# Patient Record
Sex: MALE | Race: WHITE | NOT HISPANIC OR LATINO | Employment: FULL TIME | URBAN - METROPOLITAN AREA
[De-identification: names, ages, dates, MRNs, and addresses within clinical notes are randomized per-mention and may not be internally consistent; named-entity substitution may affect disease eponyms.]

---

## 2017-01-09 ENCOUNTER — HOSPITAL ENCOUNTER (EMERGENCY)
Facility: HOSPITAL | Age: 62
Discharge: HOME | End: 2017-01-09
Attending: EMERGENCY MEDICINE
Payer: COMMERCIAL

## 2017-01-09 VITALS
WEIGHT: 181 LBS | DIASTOLIC BLOOD PRESSURE: 89 MMHG | BODY MASS INDEX: 26.73 KG/M2 | TEMPERATURE: 99 F | RESPIRATION RATE: 20 BRPM | OXYGEN SATURATION: 95 % | HEART RATE: 92 BPM | SYSTOLIC BLOOD PRESSURE: 143 MMHG

## 2017-01-09 DIAGNOSIS — R74.8 ELEVATED LIVER ENZYMES: ICD-10-CM

## 2017-01-09 DIAGNOSIS — E88.89 ALCOHOLIC KETOSIS: ICD-10-CM

## 2017-01-09 DIAGNOSIS — F10.10 ALCOHOL ABUSE: ICD-10-CM

## 2017-01-09 DIAGNOSIS — R11.2 NON-INTRACTABLE VOMITING WITH NAUSEA, UNSPECIFIED VOMITING TYPE: ICD-10-CM

## 2017-01-09 DIAGNOSIS — D69.6 THROMBOCYTOPENIA: ICD-10-CM

## 2017-01-09 DIAGNOSIS — Z72.0 TOBACCO ABUSE: ICD-10-CM

## 2017-01-09 DIAGNOSIS — R03.0 ELEVATED BLOOD PRESSURE READING: Primary | ICD-10-CM

## 2017-01-09 DIAGNOSIS — E87.29 ALCOHOLIC KETOACIDOSIS: ICD-10-CM

## 2017-01-09 DIAGNOSIS — E83.42 HYPOMAGNESEMIA: ICD-10-CM

## 2017-01-09 LAB
ALBUMIN SERPL BCP-MCNC: 4.5 G/DL
ALP SERPL-CCNC: 81 U/L
ALT SERPL W/O P-5'-P-CCNC: 39 U/L
AMPHET+METHAMPHET UR QL: NEGATIVE
ANION GAP SERPL CALC-SCNC: 21 MMOL/L
AST SERPL-CCNC: 42 U/L
B-OH-BUTYR BLD STRIP-SCNC: 0.9 MMOL/L
BACTERIA #/AREA URNS AUTO: ABNORMAL /HPF
BARBITURATES UR QL SCN>200 NG/ML: NEGATIVE
BASOPHILS # BLD AUTO: 0.01 K/UL
BASOPHILS NFR BLD: 0.1 %
BENZODIAZ UR QL SCN>200 NG/ML: NEGATIVE
BILIRUB SERPL-MCNC: 1.7 MG/DL
BILIRUB UR QL STRIP: ABNORMAL
BUN SERPL-MCNC: 8 MG/DL
BZE UR QL SCN: NEGATIVE
CALCIUM SERPL-MCNC: 9.4 MG/DL
CANNABINOIDS UR QL SCN: NEGATIVE
CHLORIDE SERPL-SCNC: 95 MMOL/L
CLARITY UR REFRACT.AUTO: CLEAR
CO2 SERPL-SCNC: 24 MMOL/L
COLOR UR AUTO: ABNORMAL
CREAT SERPL-MCNC: 0.9 MG/DL
CREAT UR-MCNC: 221.5 MG/DL
DIFFERENTIAL METHOD: ABNORMAL
EOSINOPHIL # BLD AUTO: 0 K/UL
EOSINOPHIL NFR BLD: 0 %
ERYTHROCYTE [DISTWIDTH] IN BLOOD BY AUTOMATED COUNT: 14.2 %
EST. GFR  (AFRICAN AMERICAN): >60 ML/MIN/1.73 M^2
EST. GFR  (NON AFRICAN AMERICAN): >60 ML/MIN/1.73 M^2
ETHANOL SERPL-MCNC: 86 MG/DL
GLUCOSE SERPL-MCNC: 103 MG/DL
GLUCOSE UR QL STRIP: NEGATIVE
HCT VFR BLD AUTO: 42.6 %
HGB BLD-MCNC: 15.3 G/DL
HGB UR QL STRIP: NEGATIVE
HYALINE CASTS UR QL AUTO: 1 /LPF
INR PPP: 1
KETONES UR QL STRIP: ABNORMAL
LEUKOCYTE ESTERASE UR QL STRIP: NEGATIVE
LIPASE SERPL-CCNC: 21 U/L
LYMPHOCYTES # BLD AUTO: 0.9 K/UL
LYMPHOCYTES NFR BLD: 10.5 %
MAGNESIUM SERPL-MCNC: 1.5 MG/DL
MCH RBC QN AUTO: 36.4 PG
MCHC RBC AUTO-ENTMCNC: 35.9 %
MCV RBC AUTO: 101 FL
METHADONE UR QL SCN>300 NG/ML: NEGATIVE
MICROSCOPIC COMMENT: ABNORMAL
MONOCYTES # BLD AUTO: 0.6 K/UL
MONOCYTES NFR BLD: 7.3 %
NEUTROPHILS # BLD AUTO: 6.6 K/UL
NEUTROPHILS NFR BLD: 82 %
NITRITE UR QL STRIP: NEGATIVE
OPIATES UR QL SCN: NEGATIVE
OTHER ELEMENTS URNS MICRO: ABNORMAL
PCP UR QL SCN>25 NG/ML: NEGATIVE
PH UR STRIP: 6 [PH] (ref 5–8)
PLATELET # BLD AUTO: 105 K/UL
PMV BLD AUTO: 9.2 FL
POTASSIUM SERPL-SCNC: 3.8 MMOL/L
PROT SERPL-MCNC: 7.8 G/DL
PROT UR QL STRIP: ABNORMAL
PROTHROMBIN TIME: 10.5 SEC
RBC # BLD AUTO: 4.2 M/UL
RBC #/AREA URNS AUTO: 0 /HPF (ref 0–4)
SODIUM SERPL-SCNC: 140 MMOL/L
SP GR UR STRIP: 1.02 (ref 1–1.03)
SQUAMOUS #/AREA URNS AUTO: 2 /HPF
TOXICOLOGY INFORMATION: NORMAL
URN SPEC COLLECT METH UR: ABNORMAL
UROBILINOGEN UR STRIP-ACNC: <2 EU/DL
WBC # BLD AUTO: 8.1 K/UL
WBC #/AREA URNS AUTO: 1 /HPF (ref 0–5)

## 2017-01-09 PROCEDURE — 63600175 PHARM REV CODE 636 W HCPCS: Performed by: EMERGENCY MEDICINE

## 2017-01-09 PROCEDURE — 96376 TX/PRO/DX INJ SAME DRUG ADON: CPT

## 2017-01-09 PROCEDURE — 25000003 PHARM REV CODE 250: Performed by: EMERGENCY MEDICINE

## 2017-01-09 PROCEDURE — 27000221 HC OXYGEN, UP TO 24 HOURS: Performed by: GENERAL PRACTICE

## 2017-01-09 PROCEDURE — 99284 EMERGENCY DEPT VISIT MOD MDM: CPT | Mod: 25

## 2017-01-09 PROCEDURE — 96365 THER/PROPH/DIAG IV INF INIT: CPT

## 2017-01-09 PROCEDURE — 81000 URINALYSIS NONAUTO W/SCOPE: CPT

## 2017-01-09 PROCEDURE — 80053 COMPREHEN METABOLIC PANEL: CPT

## 2017-01-09 PROCEDURE — 85025 COMPLETE CBC W/AUTO DIFF WBC: CPT

## 2017-01-09 PROCEDURE — 83690 ASSAY OF LIPASE: CPT

## 2017-01-09 PROCEDURE — 82010 KETONE BODYS QUAN: CPT

## 2017-01-09 PROCEDURE — 82570 ASSAY OF URINE CREATININE: CPT

## 2017-01-09 PROCEDURE — 83735 ASSAY OF MAGNESIUM: CPT

## 2017-01-09 PROCEDURE — 96361 HYDRATE IV INFUSION ADD-ON: CPT

## 2017-01-09 PROCEDURE — 80320 DRUG SCREEN QUANTALCOHOLS: CPT

## 2017-01-09 PROCEDURE — 96375 TX/PRO/DX INJ NEW DRUG ADDON: CPT

## 2017-01-09 PROCEDURE — 85610 PROTHROMBIN TIME: CPT

## 2017-01-09 RX ORDER — ONDANSETRON 4 MG/1
4 TABLET, ORALLY DISINTEGRATING ORAL EVERY 8 HOURS PRN
Qty: 12 TABLET | Refills: 0 | Status: SHIPPED | OUTPATIENT
Start: 2017-01-09 | End: 2017-02-13

## 2017-01-09 RX ORDER — LORAZEPAM 1 MG/1
1 TABLET ORAL EVERY 6 HOURS PRN
Qty: 16 TABLET | Refills: 0 | Status: SHIPPED | OUTPATIENT
Start: 2017-01-09 | End: 2017-02-15

## 2017-01-09 RX ORDER — MAGNESIUM SULFATE 1 G/100ML
2 INJECTION INTRAVENOUS
Status: COMPLETED | OUTPATIENT
Start: 2017-01-09 | End: 2017-01-09

## 2017-01-09 RX ORDER — ONDANSETRON 2 MG/ML
4 INJECTION INTRAMUSCULAR; INTRAVENOUS
Status: COMPLETED | OUTPATIENT
Start: 2017-01-09 | End: 2017-01-09

## 2017-01-09 RX ORDER — LORAZEPAM 2 MG/ML
2 INJECTION INTRAMUSCULAR
Status: COMPLETED | OUTPATIENT
Start: 2017-01-09 | End: 2017-01-09

## 2017-01-09 RX ORDER — LORAZEPAM 2 MG/ML
1 INJECTION INTRAMUSCULAR
Status: COMPLETED | OUTPATIENT
Start: 2017-01-09 | End: 2017-01-09

## 2017-01-09 RX ORDER — DEXTROSE MONOHYDRATE AND SODIUM CHLORIDE 5; .45 G/100ML; G/100ML
1000 INJECTION, SOLUTION INTRAVENOUS
Status: COMPLETED | OUTPATIENT
Start: 2017-01-09 | End: 2017-01-09

## 2017-01-09 RX ADMIN — ONDANSETRON 4 MG: 2 INJECTION INTRAMUSCULAR; INTRAVENOUS at 05:01

## 2017-01-09 RX ADMIN — THIAMINE HYDROCHLORIDE 100 MG: 100 INJECTION, SOLUTION INTRAMUSCULAR; INTRAVENOUS at 05:01

## 2017-01-09 RX ADMIN — SODIUM CHLORIDE 1000 ML: 0.9 INJECTION, SOLUTION INTRAVENOUS at 05:01

## 2017-01-09 RX ADMIN — LORAZEPAM 1 MG: 2 INJECTION, SOLUTION INTRAMUSCULAR; INTRAVENOUS at 05:01

## 2017-01-09 RX ADMIN — MAGNESIUM SULFATE IN DEXTROSE 2 G: 10 INJECTION, SOLUTION INTRAVENOUS at 06:01

## 2017-01-09 RX ADMIN — LORAZEPAM 2 MG: 2 INJECTION, SOLUTION INTRAMUSCULAR; INTRAVENOUS at 06:01

## 2017-01-09 RX ADMIN — DEXTROSE AND SODIUM CHLORIDE 1000 ML: 5; .45 INJECTION, SOLUTION INTRAVENOUS at 06:01

## 2017-01-09 NOTE — ED PROVIDER NOTES
"Encounter Date: 1/9/2017       History     Chief Complaint   Patient presents with    Alcohol Problem     pt states "i've been in here before for the same thing, vomiting too much from drinking too much alcohol, I think I need to get into AA because it's becoming too much of a bad habit" fifth a day since before new year, vomiting with no food consumption only alcohol     Review of patient's allergies indicates:  No Known Allergies  HPI Comments: 60 y/o male with recent binges of alcohol intake ([place]) and stating he does not eat with the binges. C/O vomiting with occasional blood streaks. "Shaky feeling". Last drank yesterday.    Patient is a 61 y.o. male presenting with the following complaint: vomiting. The history is provided by the patient.   Emesis    This is a recurrent problem. The current episode started yesterday. The problem has been unchanged. The emesis has an appearance of stomach contents. Pertinent negatives include no abdominal pain, no arthralgias, no chills, no cough, no diarrhea, no fever, no headaches, no myalgias, no sweats and no URI.     Past Medical History   Diagnosis Date    Alcohol abuse      No past medical history pertinent negatives.  History reviewed. No pertinent past surgical history.  History reviewed. No pertinent family history.  Social History   Substance Use Topics    Smoking status: Current Every Day Smoker     Packs/day: 2.00     Types: Cigarettes    Smokeless tobacco: None    Alcohol use Yes      Comment: daily- fifth     Review of Systems   Constitutional: Positive for appetite change. Negative for chills and fever.   HENT: Negative.    Eyes: Negative.    Respiratory: Negative.  Negative for cough.    Gastrointestinal: Positive for vomiting. Negative for abdominal pain and diarrhea.   Genitourinary: Negative.    Musculoskeletal: Negative for arthralgias and myalgias.   Neurological: Positive for tremors. Negative for seizures, syncope, speech difficulty, " light-headedness, numbness and headaches.   Psychiatric/Behavioral: Positive for agitation. Negative for behavioral problems.   All other systems reviewed and are negative.      Physical Exam   Initial Vitals   BP Pulse Resp Temp SpO2   01/09/17 0445 01/09/17 0445 01/09/17 0445 01/09/17 0445 01/09/17 0445   151/90 98 18 98.8 °F (37.1 °C) 98 %     Physical Exam    Nursing note and vitals reviewed.  Constitutional: He appears well-developed. No distress.   HENT:   Head: Normocephalic and atraumatic.   Right Ear: External ear normal.   Left Ear: External ear normal.   Nose: Nose normal.   Mouth/Throat: Oropharynx is clear and moist.   Eyes: Conjunctivae and EOM are normal. Pupils are equal, round, and reactive to light. Right eye exhibits no discharge. Left eye exhibits no discharge.   Neck: Normal range of motion. Neck supple. No thyromegaly present.   Cardiovascular: Normal rate, regular rhythm and intact distal pulses.   Pulmonary/Chest: Breath sounds normal. No respiratory distress. He has no wheezes. He has no rales. He exhibits no tenderness.   Abdominal: Soft. Bowel sounds are normal. He exhibits no distension and no mass. There is tenderness. There is no rebound and no guarding.   Mild, diffuse tenderness   Musculoskeletal: Normal range of motion.   Lymphadenopathy:     He has no cervical adenopathy.   Neurological: He is alert and oriented to person, place, and time. He has normal strength.   Skin: Skin is warm and dry. No rash noted.   Psychiatric: He has a normal mood and affect. His behavior is normal.         ED Course   Procedures  Labs Reviewed   CBC W/ AUTO DIFFERENTIAL - Abnormal; Notable for the following:        Result Value    RBC 4.20 (*)      (*)     MCH 36.4 (*)     Platelets 105 (*)     Lymph # 0.9 (*)     Gran% 82.0 (*)     Lymph% 10.5 (*)     All other components within normal limits   COMPREHENSIVE METABOLIC PANEL - Abnormal; Notable for the following:     Total Bilirubin 1.7 (*)      AST 42 (*)     Anion Gap 21 (*)     All other components within normal limits   MAGNESIUM - Abnormal; Notable for the following:     Magnesium 1.5 (*)     All other components within normal limits   URINALYSIS - Abnormal; Notable for the following:     Color, UA Orange (*)     Protein, UA 1+ (*)     Ketones, UA Trace (*)     Bilirubin (UA) 1+ (*)     All other components within normal limits   ALCOHOL,MEDICAL (ETHANOL) - Abnormal; Notable for the following:     Alcohol, Medical, Serum 86 (*)     All other components within normal limits   URINALYSIS MICROSCOPIC - Abnormal; Notable for the following:     Bacteria, UA Few (*)     All other components within normal limits   BETA - HYDROXYBUTYRATE, SERUM - Abnormal; Notable for the following:     Beta-Hydroxybutyrate 0.9 (*)     All other components within normal limits   DRUG SCREEN PANEL, URINE EMERGENCY   PROTIME-INR   LIPASE   BETA - HYDROXYBUTYRATE, SERUM   LIPASE         Results for orders placed or performed during the hospital encounter of 01/09/17   CBC auto differential   Result Value Ref Range    WBC 8.10 3.90 - 12.70 K/uL    RBC 4.20 (L) 4.60 - 6.20 M/uL    Hemoglobin 15.3 14.0 - 18.0 g/dL    Hematocrit 42.6 40.0 - 54.0 %     (H) 82 - 98 fL    MCH 36.4 (H) 27.0 - 31.0 pg    MCHC 35.9 32.0 - 36.0 %    RDW 14.2 11.5 - 14.5 %    Platelets 105 (L) 150 - 350 K/uL    MPV 9.2 9.2 - 12.9 fL    Gran # 6.6 1.8 - 7.7 K/uL    Lymph # 0.9 (L) 1.0 - 4.8 K/uL    Mono # 0.6 0.3 - 1.0 K/uL    Eos # 0.0 0.0 - 0.5 K/uL    Baso # 0.01 0.00 - 0.20 K/uL    Gran% 82.0 (H) 38.0 - 73.0 %    Lymph% 10.5 (L) 18.0 - 48.0 %    Mono% 7.3 4.0 - 15.0 %    Eosinophil% 0.0 0.0 - 8.0 %    Basophil% 0.1 0.0 - 1.9 %    Differential Method Automated    Comprehensive metabolic panel   Result Value Ref Range    Sodium 140 136 - 145 mmol/L    Potassium 3.8 3.5 - 5.1 mmol/L    Chloride 95 95 - 110 mmol/L    CO2 24 23 - 29 mmol/L    Glucose 103 70 - 110 mg/dL    BUN, Bld 8 6 - 20 mg/dL     Creatinine 0.9 0.5 - 1.4 mg/dL    Calcium 9.4 8.7 - 10.5 mg/dL    Total Protein 7.8 6.0 - 8.4 g/dL    Albumin 4.5 3.5 - 5.2 g/dL    Total Bilirubin 1.7 (H) 0.1 - 1.0 mg/dL    Alkaline Phosphatase 81 55 - 135 U/L    AST 42 (H) 10 - 40 U/L    ALT 39 10 - 44 U/L    Anion Gap 21 (H) 8 - 16 mmol/L    eGFR if African American >60.0 >60 mL/min/1.73 m^2    eGFR if non African American >60.0 >60 mL/min/1.73 m^2   Drug screen panel, emergency   Result Value Ref Range    Benzodiazepines Negative     Methadone metabolites Negative     Cocaine (Metab.) Negative     Opiate Scrn, Ur Negative     Barbiturate Screen, Ur Negative     Amphetamine Screen, Ur Negative     THC Negative     Phencyclidine Negative     Creatinine, Random Ur 221.5 23.0 - 375.0 mg/dL    Toxicology Information SEE COMMENT    Magnesium   Result Value Ref Range    Magnesium 1.5 (L) 1.6 - 2.6 mg/dL   Urinalysis   Result Value Ref Range    Specimen UA Urine, Clean Catch     Color, UA Orange (A) Yellow, Straw, Daily    Appearance, UA Clear Clear    pH, UA 6.0 5.0 - 8.0    Specific Gravity, UA 1.025 1.005 - 1.030    Protein, UA 1+ (A) Negative    Glucose, UA Negative Negative    Ketones, UA Trace (A) Negative    Bilirubin (UA) 1+ (A) Negative    Occult Blood UA Negative Negative    Nitrite, UA Negative Negative    Urobilinogen, UA <2.0 <2.0 EU/dL    Leukocytes, UA Negative Negative   Ethanol   Result Value Ref Range    Alcohol, Medical, Serum 86 (H) <10 mg/dL   Protime-INR   Result Value Ref Range    Prothrombin Time 10.5 9.0 - 12.5 sec    INR 1.0 0.8 - 1.2   Urinalysis Microscopic   Result Value Ref Range    RBC, UA 0 0 - 4 /hpf    WBC, UA 1 0 - 5 /hpf    Bacteria, UA Few (A) None-Occ /hpf    Squam Epithel, UA 2 /hpf    Hyaline Casts, UA 1 0-1/lpf /lpf    Other (U/A) Mucus:  Heavy None    Microscopic Comment SEE COMMENT    Lipase   Result Value Ref Range    Lipase 21 4 - 60 U/L   Beta - Hydroxybutyrate, Serum   Result Value Ref Range    Beta-Hydroxybutyrate 0.9 (H)  0.0 - 0.5 mmol/L       Results for ALEX DONG (MRN [id number]) as of 1/9/2017 06:12   Ref. Range 10/25/2016 07:23 10/25/2016 07:23 10/25/2016 07:25 12/10/2016 04:50 1/9/2017 04:55   Platelets Latest Ref Range: 150 - 350 K/uL 139 (L)   115 (L) 105 (L)                          ED Course     Medications   thiamine (B-1) 100 mg in dextrose 5 % 50 mL IVPB (100 mg Intravenous Given 1/9/17 0527)   magnesium sulfate in dextrose IVPB (premix) 2 g (2 g Intravenous New Bag 1/9/17 0620)   sodium chloride 0.9% bolus 1,000 mL (1,000 mLs Intravenous New Bag 1/9/17 0528)   lorazepam injection 1 mg (1 mg Intravenous Given 1/9/17 0528)   ondansetron injection 4 mg (4 mg Intravenous Given 1/9/17 0528)   lorazepam injection 2 mg (2 mg Intravenous Given 1/9/17 0620)   dextrose 5 % and 0.45 % NaCl infusion (1,000 mLs Intravenous New Bag 1/9/17 0620)       Results for ALEX DONG (MRN [id number]) as of 1/9/2017 06:12   Ref. Range 10/25/2016 07:23 10/25/2016 07:23 10/25/2016 07:25 12/10/2016 04:50 1/9/2017 04:55   Platelets Latest Ref Range: 150 - 350 K/uL 139 (L)   115 (L) 105 (L)       0555: Pt with minimal improvement in tremors. Anion Gap-21. Magnesium low. Will treat with Mag/,Ativan and D5 1/2ns and monitor.   Care transitioned to Dr. Barnard at 0605    6:13 AM Care from Dr. Murillo.  Patient reports that he had been binge drinking over the last few weeks.  He notes that he started having some epigastric discomfort followed by nausea vomiting.  He notes he had some blood tinged emesis.  He stopped drinking a couple days ago.  Since then he's been feeling weak and shaky.  Patient denies any melena, hematochezia, hematemesis, fever, shortness breath, difficulty breathing, hallucinations, and abdominal pain.    General: Patient's awake alert oriented.  Dry mucous membranes  HEENT:  Atraumatic normocephalic.  Rectal muscles are intact.  Pupils are equal round reactive to light and accommodation  Heart: Regular rate and rhythm  without murmur  Lungs: Clear to auscultation without rales rhonchi or wheezes  Abdomen: Soft no rebound or guarding no masses.  Extremities.  No edema, no ecchymosis or bruising  Neuro: Cranial nerves II through XII intact.  Speech clear.  No tremors  Psychiatric No hallucinations.  Normal thought process.  No SI.    Assessment.  Patient with chronic alcohol abuse.  Elevated anion gap.  Likely alcoholic ketosis.  We'll replace with D5.  Patient had been given thiamine prior.  Patient was extensively counseled on alcohol abuse as well as symptoms of alcohol withdrawal.  Patient verbalized understanding.  All questions answered.  Patient also was  Counseled on not driving.    6:44 AM Re-examined paint.  No Vomiting, appears to be resting, no complaints.     8:58 AM AM Reassessment: Dr. Barnard reassessed the pt.  The pt is resting comfortably and is NAD.  Pt states their sx have improved.  Patient is awake alert oriented.  There is no hallucinations.  There is no tremors.  Again patient's was counseled on alcohol use.  He is provided resources for alcoholism.  Patient given education on delirium tremors.  Patient's awake alert oriented.  No slurred speech.  All questions answered.  Patient is able to tolerate by mouth in the emergency room.  Discussed test results, shared treatment plan, specific conditions for return, and the need for f/u.  Patient was educated that he is not specific drive.  She verbalized understanding.  Answered their questions at this time.  Pt understands and agrees to the plan.  The pt has remained hemodynamically stable through ED course and is stable for discharge.     Follow-up Information     Follow up with Primary Care Provider of Choice. Schedule an appointment as soon as possible for a visit in 2 days.    Why:  Decrease your alcohol intake gradually.  Return to the ED for:  hallucinations, tremors, worsening weakness, suicidal thoughts, black stools, bloody vomiting, sweating or other  concerns.     Contact information:    [phone] for appointment.             New Prescriptions    LORAZEPAM (ATIVAN) 1 MG TABLET    Take 1 tablet (1 mg total) by mouth every 6 (six) hours as needed for Anxiety.    ONDANSETRON (ZOFRAN-ODT) 4 MG TBDL    Take 1 tablet (4 mg total) by mouth every 8 (eight) hours as needed.        Discontinued Medications    LORAZEPAM (ATIVAN) 1 MG TABLET    Take 1 tablet (1 mg total) by mouth every 6 (six) hours as needed for Anxiety.    ONDANSETRON (ZOFRAN-ODT) 4 MG TBDL    Take 1 tablet (4 mg total) by mouth every 8 (eight) hours as needed.         Pre-hypertension/Hypertension: The pt has been informed that they may have pre-hypertension or hypertension based on a blood pressure reading in the ED. I recommend that the pt call the PCP listed on their discharge instructions or a physician of their choice this week to arrange f/u for further evaluation of possible pre-hypertension or hypertension.     Clinical Impression:       ICD-10-CM ICD-9-CM   1. Elevated blood pressure reading R03.0 796.2   2. Alcohol abuse F10.10 305.00   3. Alcoholic ketosis E87.2 276.2   4. Hypomagnesemia E83.42 275.2   5. Tobacco abuse Z72.0 305.1   6. Thrombocytopenia D69.6 287.5   7. Non-intractable vomiting with nausea, unspecified vomiting type R11.2 787.01   8. Elevated liver enzymes R74.8 790.5   9. Alcoholic ketoacidosis E87.2 276.2       Disposition:   Disposition: Discharged  Condition: Stable       Erica Barnard, DO  01/09/17 0900

## 2017-01-09 NOTE — DISCHARGE INSTRUCTIONS
[facility]  [address]  [place], [place] [address]  [phone]    Follow up in 1- 2 days    Return to the ED for: suicidal thoughts, thoughts of self harm, thoughts of harming other, confusion, difficulty breathing, difficulty awakening or other concerns.     Driving or other activities under influence of medications - Patient and/or family/caretaker was given a prescription for, or instructed to use a medicine that may impair ability to drive, operate machinery, or participate in other potentially dangerous activities.  Patient was instructed not to participate in these activities while under the influence of these medications.    Your blood pressure was elevated in the ED.  You may have high blood pressure.   Keep a log of your blood pressure and follow up with a Primary Care Provider within the next two weeks. Call [phone] for appointment.

## 2017-01-09 NOTE — ED NOTES
Patient verbally verified and Spelled Full Name and Date of Birth. LOC: The patient is awake, alert and aware of environment, the patient is oriented x 3 and speaking appropriately.  C/O nausea & vomiting since yesterday, has been drinking a fifth of whiskey daily since before Thanksgiving, has not been eating, not passing much urine  APPEARANCE:unshaven  HEENT: tongue with thick white coating  SKIN: Cool & dry.   MUSCULOSKELETAL: MAEW  RESPIRATORY: Brief WNL  CARDIAC: Brief WNL  GASTRO: Abdomen flat, nontender, distant bowel sounds  : Voided about 300 ml rust colored urine  Peripheral Vasc: Brief WNL  NEURO: Brief WNL, fine tremor noted to extremities  PSYCH: Brief WNL

## 2017-01-09 NOTE — ED AVS SNAPSHOT
[facility]  [address] [address] [address]               Barb Carson   2017  4:49 AM   ED    Description:  Male : 1955   Department:  [facility]           Your Care was Coordinated By:     Provider Role From To    Kunal Murillo MD Attending Provider 17 0446 17 0616      Reason for Visit     Alcohol Problem           Diagnoses this Visit        Comments    Elevated blood pressure reading    -  Primary     Alcohol abuse         Alcoholic ketosis         Hypomagnesemia         Tobacco abuse         Thrombocytopenia         Non-intractable vomiting with nausea, unspecified vomiting type         Elevated liver enzymes           ED Disposition     None           To Do List           Follow-up Information     Follow up with Primary Care Provider of Choice. Schedule an appointment as soon as possible for a visit in 2 days.    Why:  Decrease your alcohol intake gradually.  Return to the ED for:  hallucinations, tremors, worsening weakness, suicidal thoughts, black stools, bloody vomiting, sweating or other concerns.     Contact information:    [phone] for appointment.        These Medications        Disp Refills Start End    lorazepam (ATIVAN) 1 MG tablet 16 tablet 0 2017    Take 1 tablet (1 mg total) by mouth every 6 (six) hours as needed for Anxiety. - Oral    ondansetron (ZOFRAN-ODT) 4 MG TbDL 12 tablet 0 2017     Take 1 tablet (4 mg total) by mouth every 8 (eight) hours as needed. - Oral      [facility]     [facility] On Call Nurse Care Line -  Assistance  Registered nurses in the [facility] provide clinical advisement, health education, appointment booking, and other advisory services.  Call for this free service at [phone].             Medications           Message regarding Medications     Verify the changes and/or additions to your medication regime listed below are the same  as discussed with your clinician today.  If any of these changes or additions are incorrect, please notify your healthcare provider.        START taking these NEW medications        Refills    lorazepam (ATIVAN) 1 MG tablet 0    Sig: Take 1 tablet (1 mg total) by mouth every 6 (six) hours as needed for Anxiety.    Class: Print    Route: Oral    ondansetron (ZOFRAN-ODT) 4 MG TbDL 0    Sig: Take 1 tablet (4 mg total) by mouth every 8 (eight) hours as needed.    Class: Print    Route: Oral      These medications were administered today        Dose Freq    thiamine (B-1) 100 mg in dextrose 5 % 50 mL IVPB 100 mg ED 1 Time    Sig: Inject 100 mg into the vein ED 1 Time.    Class: Normal    Route: Intravenous    Non-formulary Exception Code: Defer to pharmacy    sodium chloride 0.9% bolus 1,000 mL 1,000 mL ED 1 Time    Sig: Inject 1,000 mLs into the vein ED 1 Time.    Class: Normal    Route: Intravenous    lorazepam injection 1 mg 1 mg ED 1 Time    Sig: Inject 0.5 mLs (1 mg total) into the vein ED 1 Time.    Class: Normal    Route: Intravenous    ondansetron injection 4 mg 4 mg ED 1 Time    Sig: Inject 4 mg into the vein ED 1 Time.    Class: Normal    Route: Intravenous    magnesium sulfate in dextrose IVPB (premix) 2 g 2 g ED 1 Time    Sig: Inject 200 mLs (2 g total) into the vein ED 1 Time.    Class: Normal    Route: Intravenous    lorazepam injection 2 mg 2 mg ED 1 Time    Sig: Inject 1 mL (2 mg total) into the vein ED 1 Time.    Class: Normal    Route: Intravenous    dextrose 5 % and 0.45 % NaCl infusion 1,000 mL ED 1 Time    Sig: Inject 1,000 mLs into the vein ED 1 Time.    Class: Normal    Route: Intravenous           Verify that the below list of medications is an accurate representation of the medications you are currently taking.  If none reported, the list may be blank. If incorrect, please contact your healthcare provider. Carry this list with you in case of emergency.           Current Medications      promethazine (PHENERGAN) 25 MG tablet Take 1 tablet (25 mg total) by mouth every 6 (six) hours as needed for Nausea.    lorazepam (ATIVAN) 1 MG tablet Take 1 tablet (1 mg total) by mouth every 6 (six) hours as needed for Anxiety.    lorazepam injection 1 mg Inject 0.5 mLs (1 mg total) into the vein ED 1 Time.    lorazepam injection 2 mg Inject 1 mL (2 mg total) into the vein ED 1 Time.    magnesium sulfate in dextrose IVPB (premix) 2 g Inject 200 mLs (2 g total) into the vein ED 1 Time.    ondansetron (ZOFRAN-ODT) 4 MG TbDL Take 1 tablet (4 mg total) by mouth every 8 (eight) hours as needed.    thiamine (B-1) 100 mg in dextrose 5 % 50 mL IVPB Inject 100 mg into the vein ED 1 Time.           Clinical Reference Information           Your Vitals Were     BP Pulse Temp Resp Weight SpO2    140/88 78 98.8 °F (37.1 °C) (Oral) 16 82.1 kg (181 lb) 97%    BMI                26.73 kg/m2          Allergies as of 1/9/2017     No Known Allergies      Immunizations Administered on Date of Encounter - 1/9/2017     None      ED Micro, Lab, POCT     Start Ordered       Status Ordering Provider    01/09/17 0631 01/09/17 0631  Beta - Hydroxybutyrate, Serum  Once      Final result     01/09/17 0615 01/09/17 0615  Lipase  Once      Final result     01/09/17 0613 01/09/17 0612  Lipase  Add-on      Completed     01/09/17 0613 01/09/17 0612  Beta - Hydroxybutyrate, Serum  Add-on      Completed     01/09/17 0506 01/09/17 0505  Protime-INR  STAT      Final result     01/09/17 0503 01/09/17 0503  Urinalysis Microscopic  Once      Final result     01/09/17 0500 01/09/17 0503  Ethanol  Once      Final result     01/09/17 0459 01/09/17 0503  CBC auto differential  STAT      Final result     01/09/17 0459 01/09/17 0503  Comprehensive metabolic panel  STAT      Final result     01/09/17 0459 01/09/17 0503  Drug screen panel, emergency  STAT      Final result     01/09/17 0459 01/09/17 0503  Magnesium  STAT      Final result     01/09/17 0450  01/09/17 0503  Urinalysis  STAT      Final result       ED Imaging Orders     None        Discharge Instructions       [facility]  [address]  [place], [place] [address]  [phone]    Follow up in 1- 2 days    Return to the ED for: suicidal thoughts, thoughts of self harm, thoughts of harming other, confusion, difficulty breathing, difficulty awakening or other concerns.     Driving or other activities under influence of medications - Patient and/or family/caretaker was given a prescription for, or instructed to use a medicine that may impair ability to drive, operate machinery, or participate in other potentially dangerous activities.  Patient was instructed not to participate in these activities while under the influence of these medications.    Your blood pressure was elevated in the ED.  You may have high blood pressure.   Keep a log of your blood pressure and follow up with a Primary Care Provider within the next two weeks. Call [phone] for appointment.       Discharge References/Attachments     ALCOHOL ABUSE (ENGLISH)    ALCOHOL WITHDRAWAL: WHAT TO EXPECT (ENGLISH)    PLATELETS (ENGLISH)    HYPERTENSION, TO BE CONFIRMED (ENGLISH)    LORAZEPAM ORAL TABLET (ENGLISH)    DIET, VOMITING OR DIARRHEA (ADULT) (ENGLISH)    ONDANSETRON ORAL DISINTEGRATING TABLET (ENGLISH)    SMOKING CESSATION (ENGLISH)      [facility] Sign-Up     Activating your [facility] account is as easy as 1-2-3!     1) Visit [email or web address], select Sign Up Now, enter this activation code and your date of birth, then select Next.  [email or web address]  Expires: 1/24/2017  5:52 AM      2) Create a username and password to use when you visit [facility] in the future and select a security question in case you lose your password and select Next.    3) Enter your e-mail address and click Sign Up!    Additional Information  If you have questions, please e-mail [email or web address] or call [phone] to talk to our  [facility] staff. Remember, [facility] is NOT to be used for urgent needs. For medical emergencies, dial 911.         Smoking Cessation     If you would like to quit smoking:   You may be eligible for free services if you are a [place] resident and started smoking cigarettes before September 1, 1988.  Call the Smoking Cessation Trust (SCT) toll free at (097) 712-2441 or (568) 539-9924.   Call 1-800-QUIT-NOW if you do not meet the above criteria.             [facility] complies with applicable Federal civil rights laws and does not discriminate on the basis of race, color, national origin, age, disability, or sex.        Language Assistance Services     ATTENTION: Language assistance services are available, free of charge. Please call [phone].      ATENCIÓN: Si habla español, tiene a pearson disposición servicios gratuitos de asistencia lingüística. Llame al [phone].     CHÚ Ý: N?u b?n nói Ti?ng Vi?t, có các d?ch v? h? tr? ngôn ng? mi?n phí dành cho b?n. G?i s? [phone].

## 2017-02-13 ENCOUNTER — HOSPITAL ENCOUNTER (EMERGENCY)
Facility: HOSPITAL | Age: 62
Discharge: HOME | End: 2017-02-13
Attending: EMERGENCY MEDICINE
Payer: COMMERCIAL

## 2017-02-13 VITALS
RESPIRATION RATE: 19 BRPM | OXYGEN SATURATION: 98 % | HEIGHT: 69 IN | DIASTOLIC BLOOD PRESSURE: 73 MMHG | WEIGHT: 180 LBS | SYSTOLIC BLOOD PRESSURE: 129 MMHG | BODY MASS INDEX: 26.66 KG/M2 | TEMPERATURE: 99 F | HEART RATE: 81 BPM

## 2017-02-13 DIAGNOSIS — N39.0 URINARY TRACT INFECTION WITH HEMATURIA, SITE UNSPECIFIED: ICD-10-CM

## 2017-02-13 DIAGNOSIS — R31.9 HEMATURIA: Primary | ICD-10-CM

## 2017-02-13 DIAGNOSIS — R31.9 URINARY TRACT INFECTION WITH HEMATURIA, SITE UNSPECIFIED: ICD-10-CM

## 2017-02-13 DIAGNOSIS — R11.10 VOMITING, INTRACTABILITY OF VOMITING NOT SPECIFIED, PRESENCE OF NAUSEA NOT SPECIFIED, UNSPECIFIED VOMITING TYPE: ICD-10-CM

## 2017-02-13 LAB
ANION GAP SERPL CALC-SCNC: 18 MMOL/L
BACTERIA #/AREA URNS AUTO: ABNORMAL /HPF
BASOPHILS # BLD AUTO: 0.01 K/UL
BASOPHILS NFR BLD: 0.2 %
BILIRUB UR QL STRIP: ABNORMAL
BUN SERPL-MCNC: 8 MG/DL
CALCIUM SERPL-MCNC: 9.7 MG/DL
CHLORIDE SERPL-SCNC: 99 MMOL/L
CLARITY UR REFRACT.AUTO: CLEAR
CO2 SERPL-SCNC: 24 MMOL/L
COLOR UR AUTO: ABNORMAL
CREAT SERPL-MCNC: 1 MG/DL
DIFFERENTIAL METHOD: ABNORMAL
EOSINOPHIL # BLD AUTO: 0 K/UL
EOSINOPHIL NFR BLD: 0 %
ERYTHROCYTE [DISTWIDTH] IN BLOOD BY AUTOMATED COUNT: 14.4 %
EST. GFR  (AFRICAN AMERICAN): >60 ML/MIN/1.73 M^2
EST. GFR  (NON AFRICAN AMERICAN): >60 ML/MIN/1.73 M^2
GLUCOSE SERPL-MCNC: 148 MG/DL
GLUCOSE UR QL STRIP: NEGATIVE
GRAN CASTS UR QL COMP ASSIST: 1 /LPF
HCT VFR BLD AUTO: 42.4 %
HGB BLD-MCNC: 14.9 G/DL
HGB UR QL STRIP: NEGATIVE
HYALINE CASTS UR QL AUTO: >100 /LPF
KETONES UR QL STRIP: ABNORMAL
LEUKOCYTE ESTERASE UR QL STRIP: NEGATIVE
LYMPHOCYTES # BLD AUTO: 0.3 K/UL
LYMPHOCYTES NFR BLD: 5.1 %
MCH RBC QN AUTO: 36.2 PG
MCHC RBC AUTO-ENTMCNC: 35.1 %
MCV RBC AUTO: 103 FL
MICROSCOPIC COMMENT: ABNORMAL
MONOCYTES # BLD AUTO: 0.2 K/UL
MONOCYTES NFR BLD: 3.6 %
NEUTROPHILS # BLD AUTO: 5.6 K/UL
NEUTROPHILS NFR BLD: 91.1 %
NITRITE UR QL STRIP: POSITIVE
PH UR STRIP: 5 [PH] (ref 5–8)
PLATELET # BLD AUTO: 143 K/UL
PMV BLD AUTO: 9.5 FL
POTASSIUM SERPL-SCNC: 3.6 MMOL/L
PROT UR QL STRIP: ABNORMAL
RBC # BLD AUTO: 4.12 M/UL
RBC #/AREA URNS AUTO: 0 /HPF (ref 0–4)
SODIUM SERPL-SCNC: 141 MMOL/L
SP GR UR STRIP: >=1.03 (ref 1–1.03)
URN SPEC COLLECT METH UR: ABNORMAL
UROBILINOGEN UR STRIP-ACNC: ABNORMAL EU/DL
WBC # BLD AUTO: 6.48 K/UL
WBC #/AREA URNS AUTO: 5 /HPF (ref 0–5)

## 2017-02-13 PROCEDURE — 63600175 PHARM REV CODE 636 W HCPCS: Performed by: EMERGENCY MEDICINE

## 2017-02-13 PROCEDURE — 99283 EMERGENCY DEPT VISIT LOW MDM: CPT | Mod: 25

## 2017-02-13 PROCEDURE — 80048 BASIC METABOLIC PNL TOTAL CA: CPT

## 2017-02-13 PROCEDURE — 96361 HYDRATE IV INFUSION ADD-ON: CPT

## 2017-02-13 PROCEDURE — 81000 URINALYSIS NONAUTO W/SCOPE: CPT

## 2017-02-13 PROCEDURE — 85025 COMPLETE CBC W/AUTO DIFF WBC: CPT

## 2017-02-13 PROCEDURE — 96374 THER/PROPH/DIAG INJ IV PUSH: CPT

## 2017-02-13 PROCEDURE — 25000003 PHARM REV CODE 250: Performed by: EMERGENCY MEDICINE

## 2017-02-13 RX ORDER — ONDANSETRON 2 MG/ML
4 INJECTION INTRAMUSCULAR; INTRAVENOUS
Status: COMPLETED | OUTPATIENT
Start: 2017-02-13 | End: 2017-02-13

## 2017-02-13 RX ORDER — CIPROFLOXACIN 250 MG/1
250 TABLET, FILM COATED ORAL 2 TIMES DAILY
Qty: 14 TABLET | Refills: 0 | Status: SHIPPED | OUTPATIENT
Start: 2017-02-13 | End: 2017-02-20

## 2017-02-13 RX ORDER — SODIUM CHLORIDE 9 MG/ML
1000 INJECTION, SOLUTION INTRAVENOUS ONCE
Status: COMPLETED | OUTPATIENT
Start: 2017-02-13 | End: 2017-02-13

## 2017-02-13 RX ADMIN — SODIUM CHLORIDE 1000 ML: 0.9 INJECTION, SOLUTION INTRAVENOUS at 02:02

## 2017-02-13 RX ADMIN — ONDANSETRON 4 MG: 2 INJECTION INTRAMUSCULAR; INTRAVENOUS at 02:02

## 2017-02-13 NOTE — ED PROVIDER NOTES
"Encounter Date: 2/13/2017       History     Chief Complaint   Patient presents with    Vomiting     had ETOH last night "im dehydrated"     Review of patient's allergies indicates:  No Known Allergies  HPI Comments: Patient presents to the ED c/o vomiting for the since yesterday.  Patient denies any abdominal pain or diarrhea.  He is a well appearing white male with a history alcohol abuse.  Patient drinks about 1 pint daily.  Patient last drank last night.  He shows no evidence of withdraws currently.  The patient has no hx of sick contacts.  He is not toxic appearing.    The history is provided by the patient.     Past Medical History   Diagnosis Date    Alcohol abuse      No past medical history pertinent negatives.  History reviewed. No pertinent past surgical history.  History reviewed. No pertinent family history.  Social History   Substance Use Topics    Smoking status: Current Every Day Smoker     Packs/day: 2.00     Types: Cigarettes    Smokeless tobacco: None    Alcohol use Yes      Comment: daily- fifth     Review of Systems   Constitutional: Negative.  Negative for chills, fatigue and fever.   HENT: Negative.    Eyes: Negative.    Respiratory: Negative.    Cardiovascular: Negative.    Gastrointestinal: Positive for nausea and vomiting. Negative for abdominal distention, abdominal pain, constipation, diarrhea and rectal pain.   Endocrine: Negative.    Genitourinary: Negative.    Musculoskeletal: Negative.    Skin: Negative for color change, pallor, rash and wound.   Allergic/Immunologic: Negative.    Neurological: Negative.    Hematological: Negative.    Psychiatric/Behavioral: Negative.        Physical Exam   Initial Vitals   BP Pulse Resp Temp SpO2   02/13/17 1340 02/13/17 1340 02/13/17 1340 02/13/17 1340 02/13/17 1340   134/85 94 18 98.6 °F (37 °C) 97 %     Physical Exam    Constitutional: He appears well-developed and well-nourished.   HENT:   Head: Normocephalic and atraumatic.   Eyes: " Conjunctivae and EOM are normal. Pupils are equal, round, and reactive to light.   Neck: Normal range of motion. Neck supple.   Cardiovascular: Normal rate, regular rhythm, normal heart sounds and intact distal pulses.   Pulmonary/Chest: Breath sounds normal.   Abdominal: Soft. He exhibits no distension and no mass. There is no tenderness. There is no rebound and no guarding.   Musculoskeletal: Normal range of motion.   Neurological: He is alert and oriented to person, place, and time. He has normal strength.   Skin: Skin is warm and dry.   Psychiatric: He has a normal mood and affect. His behavior is normal. Judgment and thought content normal.         ED Course   Procedures  Labs Reviewed   BASIC METABOLIC PANEL - Abnormal; Notable for the following:        Result Value    Glucose 148 (*)     Anion Gap 18 (*)     All other components within normal limits   URINALYSIS - Abnormal; Notable for the following:     Color, UA Brown (*)     Specific Gravity, UA >=1.030 (*)     Protein, UA 2+ (*)     Ketones, UA 1+ (*)     Bilirubin (UA) 2+ (*)     Nitrite, UA Positive (*)     Urobilinogen, UA 4.0-6.0 (*)     All other components within normal limits   URINALYSIS MICROSCOPIC - Abnormal; Notable for the following:     Hyaline Casts, UA >100 (*)     Granular Casts, UA 1 (*)     All other components within normal limits   CBC W/ AUTO DIFFERENTIAL                               ED Course   3:46 PM  Patient stable at this time.  He shows no evidence of withdraw symptoms.  I have discussed with the patient the importance of follow up with address his hematuria.  Patient has been given Dr. Lopez's information.  Patient told to return to the ED for any concerns or worsening of condition.     ED ONGOING VITALS:  Vitals:    02/13/17 1340 02/13/17 1435 02/13/17 1437 02/13/17 1439   BP: 134/85 123/70 120/70 121/72   Pulse: 94 85 84 105   Resp: 18 18 18 18   Temp: 98.6 °F (37 °C)      TempSrc: Oral      SpO2: 97%  95% 95%   Weight:  "81.6 kg (180 lb)      Height: 5' 9" (1.753 m)            ABNORMAL LAB VALUES:  Labs Reviewed   CBC W/ AUTO DIFFERENTIAL - Abnormal; Notable for the following:        Result Value    RBC 4.12 (*)      (*)     MCH 36.2 (*)     Platelets 143 (*)     All other components within normal limits   BASIC METABOLIC PANEL - Abnormal; Notable for the following:     Glucose 148 (*)     Anion Gap 18 (*)     All other components within normal limits   URINALYSIS - Abnormal; Notable for the following:     Color, UA Brown (*)     Specific Gravity, UA >=1.030 (*)     Protein, UA 2+ (*)     Ketones, UA 1+ (*)     Bilirubin (UA) 2+ (*)     Nitrite, UA Positive (*)     Urobilinogen, UA 4.0-6.0 (*)     All other components within normal limits   URINALYSIS MICROSCOPIC - Abnormal; Notable for the following:     Hyaline Casts, UA >100 (*)     Granular Casts, UA 1 (*)     All other components within normal limits         ALL LAB VALUES:  Results for orders placed or performed during the hospital encounter of 02/13/17   CBC auto differential   Result Value Ref Range    WBC 6.48 3.90 - 12.70 K/uL    RBC 4.12 (L) 4.60 - 6.20 M/uL    Hemoglobin 14.9 14.0 - 18.0 g/dL    Hematocrit 42.4 40.0 - 54.0 %     (H) 82 - 98 fL    MCH 36.2 (H) 27.0 - 31.0 pg    MCHC 35.1 32.0 - 36.0 %    RDW 14.4 11.5 - 14.5 %    Platelets 143 (L) 150 - 350 K/uL    MPV 9.5 9.2 - 12.9 fL   Basic metabolic panel   Result Value Ref Range    Sodium 141 136 - 145 mmol/L    Potassium 3.6 3.5 - 5.1 mmol/L    Chloride 99 95 - 110 mmol/L    CO2 24 23 - 29 mmol/L    Glucose 148 (H) 70 - 110 mg/dL    BUN, Bld 8 6 - 20 mg/dL    Creatinine 1.0 0.5 - 1.4 mg/dL    Calcium 9.7 8.7 - 10.5 mg/dL    Anion Gap 18 (H) 8 - 16 mmol/L    eGFR if African American >60.0 >60 mL/min/1.73 m^2    eGFR if non African American >60.0 >60 mL/min/1.73 m^2   Urinalysis   Result Value Ref Range    Specimen UA Urine, Clean Catch     Color, UA Brown (A) Yellow, Straw, Daily    Appearance, UA " Clear Clear    pH, UA 5.0 5.0 - 8.0    Specific Gravity, UA >=1.030 (A) 1.005 - 1.030    Protein, UA 2+ (A) Negative    Glucose, UA Negative Negative    Ketones, UA 1+ (A) Negative    Bilirubin (UA) 2+ (A) Negative    Occult Blood UA Negative Negative    Nitrite, UA Positive (A) Negative    Urobilinogen, UA 4.0-6.0 (A) <2.0 EU/dL    Leukocytes, UA Negative Negative   Urinalysis Microscopic   Result Value Ref Range    RBC, UA 0 0 - 4 /hpf    WBC, UA 5 0 - 5 /hpf    Bacteria, UA None None-Occ /hpf    Hyaline Casts, UA >100 (A) 0-1/lpf /lpf    Granular Casts, UA 1 (A) None /lpf    Microscopic Comment SEE COMMENT          RADIOLOGY STUDIES:  Imaging Results     None            The above vital signs and test results have been reviewed by the emergency provider.       ED MEDICATIONS:  Medications   0.9%  NaCl infusion (1,000 mLs Intravenous New Bag 2/13/17 2851)   ondansetron injection 4 mg (4 mg Intravenous Given 2/13/17 1277)           ED EVENTS:        3:49 PM    RE-EVALUATION & DISPOSITION:   Reassessment at the time of disposition demonstrates that the patient is resting comfortably in no acute distress.  He has remained hemodynamically stable throughout the entire ED visit and is without objective evidence for acute process requiring urgent intervention or hospitalization. I discussed test results and provided counseling to patient with regard to condition, the treatment plan, specific conditions for return, and the importance of follow up.  Answered questions at this time. The patient is stable for discharge.     New Prescriptions    CIPROFLOXACIN HCL (CIPRO) 250 MG TABLET    Take 1 tablet (250 mg total) by mouth 2 (two) times daily.      Clinical Impression:       ICD-10-CM ICD-9-CM   1. Hematuria R31.9 599.70   2. Urinary tract infection with hematuria, site unspecified N39.0 599.0    R31.9    3. Vomiting, intractability of vomiting not specified, presence of nausea not specified, unspecified vomiting type R11.10  787.03             Lionel Dockery MD  02/13/17 1549

## 2017-02-13 NOTE — ED AVS SNAPSHOT
[facility]  [address] [address] [address]               Barb Carson   2017  1:41 PM   ED    Description:  Male : 1955   Department:  [facility]           Your Care was Coordinated By:     Provider Role From To    Lionel Dockery MD Attending Provider 17 1423 --      Reason for Visit     Vomiting           Diagnoses this Visit        Comments    Hematuria    -  Primary     Urinary tract infection with hematuria, site unspecified         Vomiting, intractability of vomiting not specified, presence of nausea not specified, unspecified vomiting type           ED Disposition     None           To Do List           Follow-up Information     Follow up with Fidencio Lopez IV, MD In 1 week.    Specialty:  Urology    Why:  Patient should return to the ED for any concerns or worsening of condition.    Contact information:    [address]  [place] [address] [phone]         These Medications        Disp Refills Start End    ciprofloxacin HCl (CIPRO) 250 MG tablet 14 tablet 0 2017    Take 1 tablet (250 mg total) by mouth 2 (two) times daily. - Oral      [facility]     [facility] Care [facility] -  Assistance  Registered nurses in the [facility] provide clinical advisement, health education, appointment booking, and other advisory services.  Call for this free service at [phone].             Medications           Message regarding Medications     Verify the changes and/or additions to your medication regime listed below are the same as discussed with your clinician today.  If any of these changes or additions are incorrect, please notify your healthcare provider.        START taking these NEW medications        Refills    ciprofloxacin HCl (CIPRO) 250 MG tablet 0    Sig: Take 1 tablet (250 mg total) by mouth 2 (two) times daily.    Class: Print    Route: Oral      These  "medications were administered today        Dose Freq    0.9%  NaCl infusion 1,000 mL Once    Sig: Inject 1,000 mLs into the vein once.    Class: Normal    Route: Intravenous    ondansetron injection 4 mg 4 mg ED 1 Time    Sig: Inject 4 mg into the vein ED 1 Time.    Class: Normal    Route: Intravenous      STOP taking these medications     ondansetron (ZOFRAN-ODT) 4 MG TbDL Take 1 tablet (4 mg total) by mouth every 8 (eight) hours as needed.    promethazine (PHENERGAN) 25 MG tablet Take 1 tablet (25 mg total) by mouth every 6 (six) hours as needed for Nausea.           Verify that the below list of medications is an accurate representation of the medications you are currently taking.  If none reported, the list may be blank. If incorrect, please contact your healthcare provider. Carry this list with you in case of emergency.           Current Medications     ciprofloxacin HCl (CIPRO) 250 MG tablet Take 1 tablet (250 mg total) by mouth 2 (two) times daily.    lorazepam (ATIVAN) 1 MG tablet Take 1 tablet (1 mg total) by mouth every 6 (six) hours as needed for Anxiety.           Clinical Reference Information           Your Vitals Were     BP Pulse Temp Resp Height Weight    121/72 (Patient Position: Standing) 105 98.6 °F (37 °C) (Oral) 18 5' 9" (1.753 m) 81.6 kg (180 lb)    SpO2 BMI             95% 26.58 kg/m2         Allergies as of 2/13/2017     No Known Allergies      Immunizations Administered on Date of Encounter - 2/13/2017     None      ED Micro, Lab, POCT     Start Ordered       Status Ordering Provider    02/13/17 1427 02/13/17 1426  CBC auto differential  STAT      Preliminary result     02/13/17 1427 02/13/17 1426  Basic metabolic panel  STAT      Final result     02/13/17 1427 02/13/17 1426  Urinalysis  STAT      Final result     02/13/17 1426 02/13/17 1426  Urinalysis Microscopic  Once      Final result       ED Imaging Orders     None      [facility] Sign-Up     Activating your [facility] account is as " easy as 1-2-3!     1) Visit [email or web address], select Sign Up Now, enter this activation code and your date of birth, then select Next.  [email or web address]  Expires: 3/30/2017  3:45 PM      2) Create a username and password to use when you visit [facility] in the future and select a security question in case you lose your password and select Next.    3) Enter your e-mail address and click Sign Up!    Additional Information  If you have questions, please e-mail [email or web address] or call [phone] to talk to our [facility] staff. Remember, [facility] is NOT to be used for urgent needs. For medical emergencies, dial 911.         Smoking Cessation     If you would like to quit smoking:   You may be eligible for free services if you are a [place] resident and started smoking cigarettes before September 1, 1988.  Call the Smoking Cessation Trust ([facility]) toll free at (607) 448-3779 or (968) 898-7501.   Call 4-118-QUIT-NOW if you do not meet the above criteria.             [facility] complies with applicable Federal civil rights laws and does not discriminate on the basis of race, color, national origin, age, disability, or sex.        Language Assistance Services     ATTENTION: Language assistance services are available, free of charge. Please call [phone].      ATENCIÓN: Si habla español, tiene a pearson disposición servicios gratuitos de asistencia lingüística. Llame al [phone].     CHÚ Ý: N?u b?n nói Ti?ng Vi?t, có các d?ch v? h? tr? ngôn ng? mi?n phí dành cho b?n. G?i s? [phone].

## 2017-02-15 ENCOUNTER — OFFICE VISIT (OUTPATIENT)
Dept: INTERNAL MEDICINE | Facility: CLINIC | Age: 62
End: 2017-02-15
Payer: COMMERCIAL

## 2017-02-15 VITALS
DIASTOLIC BLOOD PRESSURE: 78 MMHG | WEIGHT: 181.88 LBS | SYSTOLIC BLOOD PRESSURE: 116 MMHG | OXYGEN SATURATION: 96 % | TEMPERATURE: 99 F | RESPIRATION RATE: 16 BRPM | HEART RATE: 77 BPM | BODY MASS INDEX: 27.57 KG/M2 | HEIGHT: 68 IN

## 2017-02-15 DIAGNOSIS — Z72.0 TOBACCO USE: ICD-10-CM

## 2017-02-15 DIAGNOSIS — F32.A DEPRESSION, UNSPECIFIED DEPRESSION TYPE: ICD-10-CM

## 2017-02-15 DIAGNOSIS — F10.10 ALCOHOL ABUSE: Primary | ICD-10-CM

## 2017-02-15 PROCEDURE — 99999 PR PBB SHADOW E&M-EST. PATIENT-LVL III: CPT | Mod: PBBFAC,,, | Performed by: FAMILY MEDICINE

## 2017-02-15 PROCEDURE — 99203 OFFICE O/P NEW LOW 30 MIN: CPT | Mod: S$GLB,,, | Performed by: FAMILY MEDICINE

## 2017-02-15 RX ORDER — THIAMINE HCL 50 MG
50 TABLET ORAL DAILY
Status: ON HOLD | COMMUNITY
Start: 2017-02-15 | End: 2017-04-26

## 2017-02-15 RX ORDER — MULTIVITAMIN
1 TABLET ORAL DAILY
COMMUNITY
Start: 2017-02-15 | End: 2017-08-15

## 2017-02-15 RX ORDER — LORAZEPAM 1 MG/1
1 TABLET ORAL EVERY 6 HOURS PRN
Qty: 30 TABLET | Refills: 0 | Status: SHIPPED | OUTPATIENT
Start: 2017-02-15 | End: 2017-03-16 | Stop reason: SDUPTHER

## 2017-02-15 RX ORDER — SERTRALINE HYDROCHLORIDE 50 MG/1
50 TABLET, FILM COATED ORAL DAILY
Qty: 30 TABLET | Refills: 11 | Status: SHIPPED | OUTPATIENT
Start: 2017-02-15 | End: 2017-08-15

## 2017-02-15 NOTE — MEDICAL/APP STUDENT
Subjective:       Patient ID: Barb Carson is a 61 y.o. male.    Chief Complaint: Hematuria (pt currently on cipro )    HPI     60 yo WM presenting alone for follow up from recent ED visit.  Pt went to ED on 2/13 for dehydration and blood in the urine.  Pt reports he believes he still has blood in his urine.  Pt reports constant suprapubic pain that increases at night when lying down.  Denies urinary frequency, dysuria, and urinary retention.  Denies burning with urination.  Reports lower back pain daily in afternoon after getting of work after being on his feet all day - this has been going on for several months.  Denies fever/vomiting.  Reports soft bowel movements with following each meal x 2-3 mths.  Denies cough.  Reports mild SOB with exertion x ~ 1 year.  Reports 1.5 PPD for ~ 20 years.  Reports drinking 1 pint/day during week days and a fifth on weekends - bourbon/whiskey x ~ 1 year.      History reviewed. No pertinent family history.  Current Outpatient Prescriptions on File Prior to Visit   Medication Sig Dispense Refill    ciprofloxacin HCl (CIPRO) 250 MG tablet Take 1 tablet (250 mg total) by mouth 2 (two) times daily. 14 tablet 0    [DISCONTINUED] lorazepam (ATIVAN) 1 MG tablet Take 1 tablet (1 mg total) by mouth every 6 (six) hours as needed for Anxiety. 16 tablet 0     No current facility-administered medications on file prior to visit.        Review of Systems   Constitutional: Negative for appetite change and fever.   HENT: Negative for dental problem, ear pain, nosebleeds, rhinorrhea and sore throat.    Eyes:        Reports worsening vision over past several years.   Respiratory: Positive for cough and shortness of breath. Negative for wheezing.    Cardiovascular: Negative for chest pain and leg swelling.   Gastrointestinal: Negative for abdominal pain, blood in stool and nausea.   Genitourinary: Positive for hematuria. Negative for difficulty urinating, dysuria and frequency.  "  Musculoskeletal: Positive for back pain.   Skin: Negative for rash.   Neurological: Positive for tremors and light-headedness. Negative for dizziness.        Reports intermittent lightheadedness x ~ 3 /week after drinking.   Psychiatric/Behavioral: Positive for sleep disturbance. Negative for suicidal ideas.        Reports depression denies SI       Objective:     Visit Vitals    /78 (BP Location: Left arm, Patient Position: Sitting, BP Method: Automatic)    Pulse 77    Temp 98.5 °F (36.9 °C) (Tympanic)    Resp 16    Ht 5' 8" (1.727 m)    Wt 82.5 kg (181 lb 14.1 oz)    SpO2 96%    BMI 27.65 kg/m2        Physical Exam   Constitutional: He is oriented to person, place, and time. He appears well-developed and well-nourished. No distress.   HENT:   Head: Normocephalic and atraumatic.   Right Ear: External ear normal.   Nose: Nose normal.   Mouth/Throat: No oropharyngeal exudate.   Eyes: Conjunctivae are normal. Right eye exhibits no discharge. Left eye exhibits no discharge.   Neck: Normal range of motion. No tracheal deviation present.   Cardiovascular: Normal rate, regular rhythm and normal heart sounds.    No murmur heard.  Pulmonary/Chest: Effort normal. He has wheezes.   Intermittent expiratory wheezing noted to left posterior lower lobe   Abdominal: Soft. Bowel sounds are normal. There is tenderness.   Musculoskeletal: Normal range of motion. He exhibits no edema.   Neurological: He is alert and oriented to person, place, and time.   Skin: Skin is warm and dry. He is not diaphoretic.   Psychiatric: He has a normal mood and affect. His behavior is normal.       Assessment & Plan     1. Alcohol abuse  Counseled on the importance of complete cessation.  Provided with handout on community resources especially Alcoholics Anonymous.  I feel that this problem is secondary to chronic depression and is causing him to have the lab abnormalities of urinary problems.  Advised him to follow-up in one month so " that we can recheck lab studies.  Provided him with thiamine, multivitamin and folate supplementation.    2. Depression, unspecified depression type  Starting him on Zoloft to help treat depression and anxiety.  Also provided with Ativan for breakthrough anxiety that could be secondary to alcoholic withdrawal or anxiety.    3. Tobacco use  Will discuss further cessation at future visits and also may consider a low-dose CT scan for lung cancer screening.

## 2017-02-15 NOTE — MR AVS SNAPSHOT
[facility]  [address] [address] [address]  Phone: [phone]                  Barb Carson   2/15/2017 4:20 PM   Office Visit    Description:  Male : 1955   Provider:  Tony Paul DO   Department:  [facility]           Reason for Visit     Hematuria           Diagnoses this Visit        Comments    Alcohol abuse    -  Primary     Depression, unspecified depression type         Tobacco use                To Do List           Future Appointments        Provider Department Dept Phone    3/16/2017 4:00 PM Tony Paul DO [facility] [phone]      Goals (5 Years of Data)     None      Follow-Up and Disposition     Return in about 4 weeks (around 3/15/2017).       These Medications        Disp Refills Start End    lorazepam (ATIVAN) 1 MG tablet 30 tablet 0 2/15/2017 3/17/2017    Take 1 tablet (1 mg total) by mouth every 6 (six) hours as needed for Anxiety. - Oral    Pharmacy: [facility] [address] Ph #: [phone]       sertraline (ZOLOFT) 50 MG tablet 30 tablet 11 2/15/2017 2/15/2018    Take 1 tablet (50 mg total) by mouth once daily. - Oral    Pharmacy: [facility] [address] Ph #: [phone]       INV folate 1 MG tablet 30 tablet 5 2/15/2017     Take 1 tablet (1 mg total) by mouth once daily. - Oral    Pharmacy: [facility] [address] Ph #: [phone]         PURCHASE these Medications (No prescription required)        Start End    thiamine (VITAMIN B-1) 50 MG tablet 2/15/2017     Sig - Route: Take 1 tablet (50 mg total) by mouth once daily. - Oral    Class: OTC    multivitamin (THERAGRAN) per tablet 2/15/2017     Sig - Route: Take 1 tablet by mouth once daily. - Oral    Class: OTC      [facility]     [facility] Care [facility] -  Assistance  Registered nurses in the [facility] provide clinical advisement, health education, appointment booking, and other advisory services.  Call for this free service at [phone].             Medications           Message regarding Medications     Verify the changes and/or additions to your medication regime listed below are the same as discussed with your clinician today.  If any of these changes or additions are incorrect, please notify your healthcare provider.        START taking these NEW medications        Refills    sertraline (ZOLOFT) 50 MG tablet 11    Sig: Take 1 tablet (50 mg total) by mouth once daily.    Class: Normal    Route: Oral    thiamine (VITAMIN B-1) 50 MG tablet     Sig: Take 1 tablet (50 mg total) by mouth once daily.    Class: OTC    Route: Oral    INV folate 1 MG tablet 5    Sig: Take 1 tablet (1 mg total) by mouth once daily.    Class: Outpatient Research    Route: Oral    multivitamin (THERAGRAN) per tablet     Sig: Take 1 tablet by mouth once daily.    Class: OTC    Route: Oral           Verify that the below list of medications is an accurate representation of the medications you are currently taking.  If none reported, the list may be blank. If incorrect, please contact your healthcare provider. Carry this list with you in case of emergency.           Current Medications     ciprofloxacin HCl (CIPRO) 250 MG tablet Take 1 tablet (250 mg total) by mouth 2 (two) times daily.    INV folate 1 MG tablet Take 1 tablet (1 mg total) by mouth once daily.    lorazepam (ATIVAN) 1 MG tablet Take 1 tablet (1 mg total) by mouth every 6 (six) hours as needed for Anxiety.    multivitamin (THERAGRAN) per tablet Take 1 tablet by mouth once daily.    sertraline (ZOLOFT) 50 MG tablet Take 1 tablet (50 mg total) by mouth once daily.    thiamine (VITAMIN B-1) 50 MG tablet Take 1 tablet (50 mg total) by mouth once daily.           Clinical Reference Information           Your Vitals Were     BP Pulse Temp Resp    116/78 (BP Location:  "Left arm, Patient Position: Sitting, BP Method: Automatic) 77 98.5 °F (36.9 °C) (Tympanic) 16    Height Weight SpO2 BMI    5' 8" (1.727 m) 82.5 kg (181 lb 14.1 oz) 96% 27.65 kg/m2      Blood Pressure          Most Recent Value    BP  116/78      Allergies as of 2/15/2017     No Known Allergies      Immunizations Administered on Date of Encounter - 2/15/2017     None      [facility] Sign-Up     Activating your [facility] account is as easy as 1-2-3!     1) Visit [email or web address], select Sign Up Now, enter this activation code and your date of birth, then select Next.  [email or web address]  Expires: 3/30/2017  3:45 PM      2) Create a username and password to use when you visit [facility] in the future and select a security question in case you lose your password and select Next.    3) Enter your e-mail address and click Sign Up!    Additional Information  If you have questions, please e-mail [email or web address] or call [phone] to talk to our [facility] staff. Remember, [facility] is NOT to be used for urgent needs. For medical emergencies, dial 911.         Instructions      Recovering from Addiction: Continuing with Counseling  The road to recovery can be tough. But working with a counselor can help make your recovery smoother and keep you on track. A counselor can help you decide which lifestyle changes you want to make to stay sober. Also, consider talking with a counselor about other issues you may want to work on. He or she can help you find resources for anger management, problem-solving skills, or assertiveness training.    Be aware of your triggers  Triggers are things that make you want to use again. They can be people you used with or places, things, and events that make you want to use. Stress and feelings like loneliness, anxiety, or depression can also make you want to use again. When you know what your triggers are, you can plan ways to avoid them when possible. To find your triggers, get a piece of paper. " "List the people, places, events, or feelings that could make you want to use again. Keep this paper. Add to it as needed. Once you have a full list, decide how to cope with these triggers without using.  Getting help  Once you admit that you have a substance abuse problem, there are many ways to find help.  · Contact your Employee Assistance Program (EAP) or Human Resources department.  · Talk to your primary care doctor and ask for a referral to an addiction specialist for an evaluation.  · Look in the white pages of your phone book for local chapters of these groups:  ¨ Alcoholics Anonymous  ¨ Cocaine Anonymous  ¨ Marijuana Anonymous  ¨ Narcotics Anonymous  ¨ SMART Recovery  · Look in the yellow pages of your phone book under 1 of the following:  ¨ Alcoholism Information and Treatment Center  ¨ Drug Abuse and Addiction Information and Treatment Center  · Look online for treatment centers near you:  ¨ Substance Abuse and Mental Health Services Administration's "treatment finder": http:// findtreatment.samhsa.gov  · Contact 1 of these national groups:  ¨ National [place] on Alcoholism and Drug Dependence  264.515.9079  ¨ National Drug and Alcohol Treatment Referral Service  285-607-HELP (104-519-3009)   Date Last Reviewed: 11/12/2014  © 2547-3558 [employer]. [address] [place] [address], [place] [address]. All rights reserved. This information is not intended as a substitute for professional medical care. Always follow your healthcare professional's instructions.             Smoking Cessation     If you would like to quit smoking:   You may be eligible for free services if you are a [place] resident and started smoking cigarettes before September 1, 1988.  Call the Smoking Cessation Trust (SCT) toll free at (155) 255-8604 or (693) 761-3855.   Call 7-800-QUIT-NOW if you do not meet the above criteria.            Language Assistance Services     ATTENTION: Language assistance services are available, free " of charge. Please call [phone].      ATENCIÓN: Si habla español, tiene a pearson disposición servicios gratuitos de asistencia lingüística. Llame al [phone].     CHÚ Ý: N?u b?n nói Ti?ng Vi?t, có các d?ch v? h? tr? ngôn ng? mi?n phí dành cho b?n. G?i s? [phone].         [facility] complies with applicable Federal civil rights laws and does not discriminate on the basis of race, color, national origin, age, disability, or sex.

## 2017-02-15 NOTE — PATIENT INSTRUCTIONS
"  Recovering from Addiction: Continuing with Counseling  The road to recovery can be tough. But working with a counselor can help make your recovery smoother and keep you on track. A counselor can help you decide which lifestyle changes you want to make to stay sober. Also, consider talking with a counselor about other issues you may want to work on. He or she can help you find resources for anger management, problem-solving skills, or assertiveness training.    Be aware of your triggers  Triggers are things that make you want to use again. They can be people you used with or places, things, and events that make you want to use. Stress and feelings like loneliness, anxiety, or depression can also make you want to use again. When you know what your triggers are, you can plan ways to avoid them when possible. To find your triggers, get a piece of paper. List the people, places, events, or feelings that could make you want to use again. Keep this paper. Add to it as needed. Once you have a full list, decide how to cope with these triggers without using.  Getting help  Once you admit that you have a substance abuse problem, there are many ways to find help.  · Contact your Employee Assistance Program (EAP) or Human Resources department.  · Talk to your primary care doctor and ask for a referral to an addiction specialist for an evaluation.  · Look in the white pages of your phone book for local chapters of these groups:  ¨ Alcoholics Anonymous  ¨ Cocaine Anonymous  ¨ Marijuana Anonymous  ¨ Narcotics Anonymous  ¨ SMART Recovery  · Look in the yellow pages of your phone book under 1 of the following:  ¨ Alcoholism Information and Treatment Center  ¨ Drug Abuse and Addiction Information and Treatment Center  · Look online for treatment centers near you:  ¨ Substance Abuse and Mental Health Services Administration's "treatment finder": http:// findtreatment.samhsa.gov  · Contact 1 of these national groups:  ¨ National " [place] on Alcoholism and Drug Dependence  330.428.5169  [facility] Drug and Alcohol Treatment Referral Service  179-644-HELP (396-498-4932)   Date Last Reviewed: 11/12/2014  © 4694-7663 [employer]. [address] [place] [address]. All rights reserved. This information is not intended as a substitute for professional medical care. Always follow your healthcare professional's instructions.

## 2017-02-16 NOTE — PROGRESS NOTES
Subjective:        Patient ID: Barb Carson is a 61 y.o. male.     Chief Complaint: Hematuria (pt currently on cipro )     HPI      62 yo WM presenting alone for follow up from recent ED visit. Pt went to ED on 2/13 for dehydration and blood in the urine. Pt reports he believes he still has blood in his urine. Pt reports constant suprapubic pain that increases at night when lying down. Denies urinary frequency, dysuria, and urinary retention. Denies burning with urination. Reports lower back pain daily in afternoon after getting of work after being on his feet all day - this has been going on for several months. Denies fever/vomiting. Reports soft bowel movements with following each meal x 2-3 mths. Denies cough. Reports mild SOB with exertion x ~ 1 year. Reports 1.5 PPD for ~ 20 years. Reports drinking 1 pint/day during week days and a fifth on weekends - bourbon/whiskey x ~ 1 year.      History reviewed. No pertinent family history.         Current Outpatient Prescriptions on File Prior to Visit   Medication Sig Dispense Refill    ciprofloxacin HCl (CIPRO) 250 MG tablet Take 1 tablet (250 mg total) by mouth 2 (two) times daily. 14 tablet 0    [DISCONTINUED] lorazepam (ATIVAN) 1 MG tablet Take 1 tablet (1 mg total) by mouth every 6 (six) hours as needed for Anxiety. 16 tablet 0      No current facility-administered medications on file prior to visit.          Review of Systems   Constitutional: Negative for appetite change and fever.   HENT: Negative for dental problem, ear pain, nosebleeds, rhinorrhea and sore throat.   Eyes:   Reports worsening vision over past several years.   Respiratory: Positive for cough and shortness of breath. Negative for wheezing.   Cardiovascular: Negative for chest pain and leg swelling.   Gastrointestinal: Negative for abdominal pain, blood in stool and nausea.   Genitourinary: Positive for hematuria. Negative for difficulty urinating, dysuria and frequency.   Musculoskeletal:  "Positive for back pain.   Skin: Negative for rash.   Neurological: Positive for tremors and light-headedness. Negative for dizziness.   Reports intermittent lightheadedness x ~ 3 /week after drinking.   Psychiatric/Behavioral: Positive for sleep disturbance. Negative for suicidal ideas.   Reports depression denies SI       Objective:           Visit Vitals    /78 (BP Location: Left arm, Patient Position: Sitting, BP Method: Automatic)    Pulse 77    Temp 98.5 °F (36.9 °C) (Tympanic)    Resp 16    Ht 5' 8" (1.727 m)    Wt 82.5 kg (181 lb 14.1 oz)    SpO2 96%    BMI 27.65 kg/m2         Physical Exam   Constitutional: He is oriented to person, place, and time. He appears well-developed and well-nourished. No distress.   HENT:   Head: Normocephalic and atraumatic.   Right Ear: External ear normal.   Nose: Nose normal.   Mouth/Throat: No oropharyngeal exudate.   Eyes: Conjunctivae are normal. Right eye exhibits no discharge. Left eye exhibits no discharge.   Neck: Normal range of motion. No tracheal deviation present.   Cardiovascular: Normal rate, regular rhythm and normal heart sounds.   No murmur heard.  Pulmonary/Chest: Effort normal. He has wheezes.   Intermittent expiratory wheezing noted to left posterior lower lobe   Abdominal: Soft. Bowel sounds are normal. There is tenderness.   Musculoskeletal: Normal range of motion. He exhibits no edema.   Neurological: He is alert and oriented to person, place, and time.   Skin: Skin is warm and dry. He is not diaphoretic.   Psychiatric: He has a normal mood and affect. His behavior is normal.       Assessment & Plan      1. Alcohol abuse  Counseled on the importance of complete cessation. Provided with handout on community resources especially Alcoholics Anonymous. I feel that this problem is secondary to chronic depression and is causing him to have the lab abnormalities of urinary problems. Advised him to follow-up in one month so that we can recheck lab " studies. Provided him with thiamine, multivitamin and folate supplementation.     2. Depression, unspecified depression type  Starting him on Zoloft to help treat depression and anxiety. Also provided with Ativan for breakthrough anxiety that could be secondary to alcoholic withdrawal or anxiety.     3. Tobacco use  Will discuss further cessation at future visits and also may consider a low-dose CT scan for lung cancer screening.

## 2017-03-02 ENCOUNTER — PATIENT OUTREACH (OUTPATIENT)
Dept: ADMINISTRATIVE | Facility: HOSPITAL | Age: 62
End: 2017-03-02

## 2017-03-02 NOTE — LETTER
March 2, 2017    Barb Carson  [address]  [place] [facility] [address]             [facility]  [address]  [facility] [address]  Phone: [phone] Dear Mr. Carson:    [facility] is committed to your overall health.  To help you get the most out of each of your visits, we will review your information to make sure you are up to date on all of your recommended tests and/or procedures.      Tony Paul, DO has found that you may be due for   Health Maintenance Due   Topic    Pneumococcal PPSV23 (Medium Risk) (1)    Colonoscopy     Influenza Vaccine     Zoster Vaccine         If you have had any of the above done at another facility, please bring the records or information with you so that your record at [facility] will be complete.    If you are currently taking medication, please bring it with you to your appointment for review.    We will be happy to assist you with scheduling any necessary appointments or you may contact the [facility] appointment desk at [phone] to schedule at your convenience.     Thank you for choosing [facility] for your healthcare needs.  If you have any questions or concerns, please don't hesitate to call.    Sincerely,  Sobeida MCFARLAND LPN Care Coordinator  [facility]

## 2017-03-12 ENCOUNTER — HOSPITAL ENCOUNTER (EMERGENCY)
Facility: HOSPITAL | Age: 62
Discharge: HOME | End: 2017-03-12
Payer: COMMERCIAL

## 2017-03-12 VITALS
TEMPERATURE: 99 F | HEART RATE: 91 BPM | SYSTOLIC BLOOD PRESSURE: 124 MMHG | WEIGHT: 167.25 LBS | RESPIRATION RATE: 20 BRPM | BODY MASS INDEX: 25.43 KG/M2 | OXYGEN SATURATION: 98 % | DIASTOLIC BLOOD PRESSURE: 74 MMHG

## 2017-03-12 DIAGNOSIS — R11.2 NAUSEA AND VOMITING, INTRACTABILITY OF VOMITING NOT SPECIFIED, UNSPECIFIED VOMITING TYPE: ICD-10-CM

## 2017-03-12 DIAGNOSIS — F10.10 ALCOHOL ABUSE: Primary | ICD-10-CM

## 2017-03-12 DIAGNOSIS — Z72.0 TOBACCO ABUSE: ICD-10-CM

## 2017-03-12 DIAGNOSIS — E80.6 HYPERBILIRUBINEMIA: ICD-10-CM

## 2017-03-12 LAB
ALBUMIN SERPL BCP-MCNC: 4.1 G/DL
ALBUMIN SERPL BCP-MCNC: 4.9 G/DL
ALP SERPL-CCNC: 73 U/L
ALP SERPL-CCNC: 90 U/L
ALT SERPL W/O P-5'-P-CCNC: 49 U/L
ALT SERPL W/O P-5'-P-CCNC: 58 U/L
AMMONIA PLAS-SCNC: 33 UMOL/L
AMYLASE SERPL-CCNC: 75 U/L
ANION GAP SERPL CALC-SCNC: 17 MMOL/L
ANION GAP SERPL CALC-SCNC: 28 MMOL/L
AST SERPL-CCNC: 39 U/L
AST SERPL-CCNC: 47 U/L
BASOPHILS # BLD AUTO: 0.02 K/UL
BASOPHILS NFR BLD: 0.3 %
BILIRUB SERPL-MCNC: 1.6 MG/DL
BILIRUB SERPL-MCNC: 2.1 MG/DL
BUN SERPL-MCNC: 11 MG/DL
BUN SERPL-MCNC: 11 MG/DL
CALCIUM SERPL-MCNC: 8.8 MG/DL
CALCIUM SERPL-MCNC: 9.9 MG/DL
CHLORIDE SERPL-SCNC: 94 MMOL/L
CHLORIDE SERPL-SCNC: 98 MMOL/L
CO2 SERPL-SCNC: 16 MMOL/L
CO2 SERPL-SCNC: 19 MMOL/L
CREAT SERPL-MCNC: 0.9 MG/DL
CREAT SERPL-MCNC: 1 MG/DL
DIFFERENTIAL METHOD: ABNORMAL
EOSINOPHIL # BLD AUTO: 0 K/UL
EOSINOPHIL NFR BLD: 0 %
ERYTHROCYTE [DISTWIDTH] IN BLOOD BY AUTOMATED COUNT: 12.8 %
EST. GFR  (AFRICAN AMERICAN): >60 ML/MIN/1.73 M^2
EST. GFR  (AFRICAN AMERICAN): >60 ML/MIN/1.73 M^2
EST. GFR  (NON AFRICAN AMERICAN): >60 ML/MIN/1.73 M^2
EST. GFR  (NON AFRICAN AMERICAN): >60 ML/MIN/1.73 M^2
ETHANOL SERPL-MCNC: 49 MG/DL
GLUCOSE SERPL-MCNC: 107 MG/DL
GLUCOSE SERPL-MCNC: 161 MG/DL
HCT VFR BLD AUTO: 47.4 %
HGB BLD-MCNC: 16.9 G/DL
LIPASE SERPL-CCNC: 26 U/L
LYMPHOCYTES # BLD AUTO: 1 K/UL
LYMPHOCYTES NFR BLD: 12.6 %
MCH RBC QN AUTO: 35.1 PG
MCHC RBC AUTO-ENTMCNC: 35.7 %
MCV RBC AUTO: 98 FL
MONOCYTES # BLD AUTO: 0.4 K/UL
MONOCYTES NFR BLD: 5 %
NEUTROPHILS # BLD AUTO: 6.3 K/UL
NEUTROPHILS NFR BLD: 82 %
PLATELET # BLD AUTO: 166 K/UL
PMV BLD AUTO: 9.3 FL
POTASSIUM SERPL-SCNC: 4.7 MMOL/L
POTASSIUM SERPL-SCNC: 4.7 MMOL/L
PROT SERPL-MCNC: 7.2 G/DL
PROT SERPL-MCNC: 8.6 G/DL
RBC # BLD AUTO: 4.82 M/UL
SODIUM SERPL-SCNC: 134 MMOL/L
SODIUM SERPL-SCNC: 138 MMOL/L
TROPONIN I SERPL DL<=0.01 NG/ML-MCNC: 0.01 NG/ML
WBC # BLD AUTO: 7.62 K/UL

## 2017-03-12 PROCEDURE — 84484 ASSAY OF TROPONIN QUANT: CPT

## 2017-03-12 PROCEDURE — 93010 ELECTROCARDIOGRAM REPORT: CPT | Mod: ,,, | Performed by: INTERNAL MEDICINE

## 2017-03-12 PROCEDURE — 83690 ASSAY OF LIPASE: CPT

## 2017-03-12 PROCEDURE — 96374 THER/PROPH/DIAG INJ IV PUSH: CPT

## 2017-03-12 PROCEDURE — 96375 TX/PRO/DX INJ NEW DRUG ADDON: CPT

## 2017-03-12 PROCEDURE — 80053 COMPREHEN METABOLIC PANEL: CPT

## 2017-03-12 PROCEDURE — 80053 COMPREHEN METABOLIC PANEL: CPT | Mod: 91

## 2017-03-12 PROCEDURE — 82140 ASSAY OF AMMONIA: CPT

## 2017-03-12 PROCEDURE — 82962 GLUCOSE BLOOD TEST: CPT

## 2017-03-12 PROCEDURE — 82150 ASSAY OF AMYLASE: CPT

## 2017-03-12 PROCEDURE — 96361 HYDRATE IV INFUSION ADD-ON: CPT

## 2017-03-12 PROCEDURE — 85025 COMPLETE CBC W/AUTO DIFF WBC: CPT

## 2017-03-12 PROCEDURE — 80320 DRUG SCREEN QUANTALCOHOLS: CPT

## 2017-03-12 PROCEDURE — 63600175 PHARM REV CODE 636 W HCPCS: Performed by: PHYSICIAN ASSISTANT

## 2017-03-12 PROCEDURE — 99284 EMERGENCY DEPT VISIT MOD MDM: CPT | Mod: 25

## 2017-03-12 PROCEDURE — 25000003 PHARM REV CODE 250: Performed by: PHYSICIAN ASSISTANT

## 2017-03-12 RX ORDER — METOCLOPRAMIDE HYDROCHLORIDE 5 MG/ML
10 INJECTION INTRAMUSCULAR; INTRAVENOUS
Status: COMPLETED | OUTPATIENT
Start: 2017-03-12 | End: 2017-03-12

## 2017-03-12 RX ORDER — LORAZEPAM 2 MG/ML
1 INJECTION INTRAMUSCULAR
Status: COMPLETED | OUTPATIENT
Start: 2017-03-12 | End: 2017-03-12

## 2017-03-12 RX ORDER — ONDANSETRON 2 MG/ML
8 INJECTION INTRAMUSCULAR; INTRAVENOUS
Status: COMPLETED | OUTPATIENT
Start: 2017-03-12 | End: 2017-03-12

## 2017-03-12 RX ADMIN — SODIUM CHLORIDE 1000 ML: 0.9 INJECTION, SOLUTION INTRAVENOUS at 06:03

## 2017-03-12 RX ADMIN — METOCLOPRAMIDE 10 MG: 5 INJECTION, SOLUTION INTRAMUSCULAR; INTRAVENOUS at 06:03

## 2017-03-12 RX ADMIN — LORAZEPAM 1 MG: 2 INJECTION, SOLUTION INTRAMUSCULAR; INTRAVENOUS at 06:03

## 2017-03-12 RX ADMIN — ONDANSETRON 8 MG: 2 INJECTION INTRAMUSCULAR; INTRAVENOUS at 05:03

## 2017-03-12 RX ADMIN — FOLIC ACID 1 MG: 5 INJECTION, SOLUTION INTRAMUSCULAR; INTRAVENOUS; SUBCUTANEOUS at 06:03

## 2017-03-12 RX ADMIN — THIAMINE HYDROCHLORIDE 100 MG: 100 INJECTION, SOLUTION INTRAMUSCULAR; INTRAVENOUS at 06:03

## 2017-03-12 RX ADMIN — ASCORBIC ACID, VITAMIN A PALMITATE, CHOLECALCIFEROL, THIAMINE HYDROCHLORIDE, RIBOFLAVIN-5 PHOSPHATE SODIUM, PYRIDOXINE HYDROCHLORIDE, NIACINAMIDE, DEXPANTHENOL, ALPHA-TOCOPHEROL ACETATE, VITAMIN K1, FOLIC ACID, BIOTIN, CYANOCOBALAMIN: 200; 3300; 200; 6; 3.6; 6; 40; 15; 10; 150; 600; 60; 5 INJECTION, SOLUTION INTRAVENOUS at 06:03

## 2017-03-12 NOTE — ED AVS SNAPSHOT
[facility]  [address] [address] [address]               Barb Carson   3/12/2017  5:18 PM   ED    Description:  Male : 1955   Department:  [facility]           Your Care was Coordinated By:     Provider Role From To    YAS Hale Physician Assistant 17 1725 17 2210      Reason for Visit     Alcohol Intoxication           Diagnoses this Visit        Comments    Alcohol abuse    -  Primary     Tobacco abuse         Nausea and vomiting, intractability of vomiting not specified, unspecified vomiting type         Hyperbilirubinemia           ED Disposition     ED Disposition Condition Comment    Discharge  Home           To Do List           Follow-up Information     Follow up with Tony Paul DO. Schedule an appointment as soon as possible for a visit in 2 days.    Specialty:  Family Medicine    Contact information:    [address] [address] [address]  [phone]        [facility]     [facility] On [facility] Nurse Care Line - / Assistance  Registered nurses in the [facility] provide clinical advisement, health education, appointment booking, and other advisory services.  Call for this free service at [phone].             Medications           Message regarding Medications     Verify the changes and/or additions to your medication regime listed below are the same as discussed with your clinician today.  If any of these changes or additions are incorrect, please notify your healthcare provider.        These medications were administered today        Dose Freq    sodium chloride 0.9% bolus 1,000 mL 1,000 mL ED 1 Time    Sig: Inject 1,000 mLs into the vein ED 1 Time.    Class: Normal    Route: Intravenous    ondansetron injection 8 mg 8 mg ED 1 Time    Sig: Inject 8 mg into the vein ED 1 Time.    Class: Normal    Route: Intravenous    sodium chloride 0.9% 1,000 mL with mvi, adult no.4 with vit K 3,300  "unit- 150 mcg/10 mL 10 mL infusion  Once    Sig: Inject into the vein once.    Class: Normal    Route: Intravenous    Non-formulary Exception Code: Defer to pharmacy    Linked Group 1:  "And" Linked Group Details      folic acid 1 mg in sodium chloride 0.9% 100 mL IVPB 1 mg Once    Sig: Inject 1 mg into the vein once.    Class: Normal    Route: Intravenous    Non-formulary Exception Code: Defer to pharmacy    Linked Group 1:  "And" Linked Group Details      thiamine (B-1) 100 mg in dextrose 5 % 50 mL IVPB 100 mg Once    Sig: Inject 100 mg into the vein once.    Class: Normal    Route: Intravenous    Non-formulary Exception Code: Defer to pharmacy    Linked Group 1:  "And" Linked Group Details      lorazepam injection 1 mg 1 mg ED 1 Time    Sig: Inject 0.5 mLs (1 mg total) into the vein ED 1 Time.    Class: Normal    Route: Intravenous    metoclopramide HCl injection 10 mg 10 mg ED 1 Time    Sig: Inject 2 mLs (10 mg total) into the vein ED 1 Time.    Class: Normal    Route: Intravenous           Verify that the below list of medications is an accurate representation of the medications you are currently taking.  If none reported, the list may be blank. If incorrect, please contact your healthcare provider. Carry this list with you in case of emergency.           Current Medications     lorazepam (ATIVAN) 1 MG tablet Take 1 tablet (1 mg total) by mouth every 6 (six) hours as needed for Anxiety.    sertraline (ZOLOFT) 50 MG tablet Take 1 tablet (50 mg total) by mouth once daily.    INV folate 1 MG tablet Take 1 tablet (1 mg total) by mouth once daily.    lorazepam injection 1 mg Inject 0.5 mLs (1 mg total) into the vein ED 1 Time.    multivitamin (THERAGRAN) per tablet Take 1 tablet by mouth once daily.    thiamine (VITAMIN B-1) 50 MG tablet Take 1 tablet (50 mg total) by mouth once daily.           Clinical Reference Information           Your Vitals Were     BP Pulse Temp Resp Weight SpO2    133/80 87 98 °F (36.7 °C) " (Oral) 18 75.9 kg (167 lb 4 oz) 98%    BMI                25.43 kg/m2          Allergies as of 3/12/2017     No Known Allergies      Immunizations Administered on Date of Encounter - 3/12/2017     None      ED Micro, Lab, POCT     Start Ordered       Status Ordering Provider    03/12/17 2135 03/12/17 2134  Comprehensive metabolic panel  STAT      Final result     03/12/17 1821 03/12/17 1820  Ammonia  Once      Final result     03/12/17 1733 03/12/17 1733  Troponin I  STAT      Final result     03/12/17 1732 03/12/17 1731  Lipase  STAT      Final result     03/12/17 1732 03/12/17 1731  Amylase  STAT      Final result     03/12/17 1730 03/12/17 1731    Once,   Status:  Canceled      Canceled     03/12/17 1730 03/12/17 1731  CBC auto differential  STAT      Final result     03/12/17 1730 03/12/17 1731  Comprehensive metabolic panel  STAT      Final result     03/12/17 1730 03/12/17 1731  Ethanol  STAT      Final result     03/12/17 1730 03/12/17 1731    STAT,   Status:  Canceled      Canceled       ED Imaging Orders     None      Discharge References/Attachments     ALCOHOL ABUSE (ENGLISH)      Your Scheduled Appointments     Mar 16, 2017  4:00 PM CDT   Established Patient Visit with Tony Paul DO   [facility] ([address] [id number] [phone]              [facility] Sign-Up     Activating your [facility] account is as easy as 1-2-3!     1) Visit [email or web address], select Sign Up Now, enter this activation code and your date of birth, then select Next.  [email or web address]  Expires: 3/30/2017  4:45 PM      2) Create a username and password to use when you visit [facility] in the future and select a security question in case you lose your password and select Next.    3) Enter your e-mail address and click Sign Up!    Additional Information  If you have questions, please e-mail [email or web address] or call [phone] to talk to our [facility] staff. Remember,  [facility] is NOT to be used for urgent needs. For medical emergencies, dial 911.          [facility] complies with applicable Federal civil rights laws and does not discriminate on the basis of race, color, national origin, age, disability, or sex.        Language Assistance Services     ATTENTION: Language assistance services are available, free of charge. Please call [phone].      ATENCIÓN: Si habla español, tiene a pearson disposición servicios gratuitos de asistencia lingüística. Llame al [phone].     CHÚ Ý: N?u b?n nói Ti?ng Vi?t, có các d?ch v? h? tr? ngôn ng? mi?n phí dành cho b?n. G?i s? [phone].

## 2017-03-12 NOTE — ED NOTES
"Pt is alcoholic. Here several times for N/V related to drinking.   LOC: The patient is awake, alert and aware of environment with an appropriate affect, the patient is oriented x 3 and speaking appropriately.  APPEARANCE: Patient resting comfortably and in no acute distress, patient is clean and well groomed, patient's clothing is properly fastened.  HEENT: Brief WNL  SKIN: Brief WNL except pael  MUSCULOSKELETAL: Brief WNL  RESPIRATORY: Brief WNL  CARDIAC: Brief WNL  GASTRO: Brief WNL except N/V; generalized abd pain tender to touch. Pt actively vomiting   : Brief WNL  Peripheral Vasc: Brief WNL  NEURO: Brief WNL  PSYCH: Brief WNL    Pt states does not want resources to help stop drinking. Pt states "yabryanna gave me that last time". Pt made no attempt to try and stop drinking or get help.   "

## 2017-03-13 LAB — POCT GLUCOSE: 76 MG/DL (ref 70–110)

## 2017-03-13 NOTE — ED NOTES
Received care of pt; pt AAO x 3, skin w/d/good color, abd soft and non-tender, MAEW, (+) peripheral pulses, resp unlabored; VVS; pt reports that he is stil feeling a bit anxious but knows he was just given meds for that; he denies any nausea or pain; call bell at side; will continue to monitor

## 2017-03-13 NOTE — ED PROVIDER NOTES
Encounter Date: 3/12/2017       History     Chief Complaint   Patient presents with    Alcohol Intoxication     pt states stopped drinking last night and today nauseated and vomiting     Review of patient's allergies indicates:  No Known Allergies  The history is provided by the patient.      Pt presents intoxicated(ETOH) with nausea and vomiting and abdominal pain. Pain is constant, moderate and sharp. He is also anxious  Past Medical History:   Diagnosis Date    Alcohol abuse     Anxiety      History reviewed. No pertinent surgical history.  History reviewed. No pertinent family history.  Social History   Substance Use Topics    Smoking status: Current Every Day Smoker     Packs/day: 2.00     Types: Cigarettes    Smokeless tobacco: None    Alcohol use Yes      Comment: daily- fifth     Review of Systems   Constitutional: Positive for chills. Negative for fever.   HENT: Negative for sore throat.    Respiratory: Negative for shortness of breath.    Cardiovascular: Negative for chest pain.   Gastrointestinal: Positive for abdominal pain, nausea and vomiting.   Genitourinary: Negative for dysuria.   Musculoskeletal: Negative for back pain.   Skin: Negative for rash.   Neurological: Negative for weakness.   Hematological: Does not bruise/bleed easily.       Physical Exam   Initial Vitals   BP Pulse Resp Temp SpO2   03/12/17 1717 03/12/17 1717 03/12/17 1717 03/12/17 1717 03/12/17 1717   111/79 131 20 98 °F (36.7 °C) 98 %     Physical Exam    Nursing note and vitals reviewed.  Constitutional: He appears well-developed and well-nourished. He appears distressed (and anxious).   HENT:   Head: Normocephalic and atraumatic.   Mouth/Throat: Oropharynx is clear and moist.   Eyes: Conjunctivae and EOM are normal.   Neck: Normal range of motion. Neck supple.   Cardiovascular: Normal rate, regular rhythm and normal heart sounds.   No murmur heard.  Pulmonary/Chest: Breath sounds normal. No respiratory distress. He has no  wheezes. He has no rhonchi. He has no rales.   Abdominal: Soft. Bowel sounds are normal. There is tenderness (mild epigastric tenderness). There is no rebound and no guarding.   Musculoskeletal: Normal range of motion. He exhibits no edema.   Neurological: He is alert and oriented to person, place, and time. He has normal strength. No sensory deficit.   Skin: Skin is warm and dry.   Psychiatric: He has a normal mood and affect. Thought content normal.         ED Course   Procedures  Labs Reviewed   CBC W/ AUTO DIFFERENTIAL - Abnormal; Notable for the following:        Result Value    MCH 35.1 (*)     Gran% 82.0 (*)     Lymph% 12.6 (*)     All other components within normal limits   COMPREHENSIVE METABOLIC PANEL - Abnormal; Notable for the following:     Chloride 94 (*)     CO2 16 (*)     Total Protein 8.6 (*)     Total Bilirubin 1.6 (*)     AST 47 (*)     ALT 58 (*)     Anion Gap 28 (*)     All other components within normal limits   ALCOHOL,MEDICAL (ETHANOL) - Abnormal; Notable for the following:     Alcohol, Medical, Serum 49 (*)     All other components within normal limits   COMPREHENSIVE METABOLIC PANEL - Abnormal; Notable for the following:     Sodium 134 (*)     CO2 19 (*)     Glucose 161 (*)     Total Bilirubin 2.1 (*)     ALT 49 (*)     Anion Gap 17 (*)     All other components within normal limits   LIPASE   AMYLASE   TROPONIN I   AMMONIA   POCT GLUCOSE        Repeat exam pt improved after IV fluids, Banana bag, and observation.                        ED Course     Clinical Impression:   The primary encounter diagnosis was Alcohol abuse. Diagnoses of Tobacco abuse, Nausea and vomiting, intractability of vomiting not specified, unspecified vomiting type, and Hyperbilirubinemia were also pertinent to this visit.          YAS Hale  03/17/17 9406

## 2017-03-13 NOTE — ED NOTES
Pt AAO x 3 and in no acute distress; denies any further nausea or vomiting; denies pain; pt no longer anxious; ambulatory in room with no difficulty; pt stable to be discharged per MD

## 2017-03-16 ENCOUNTER — OFFICE VISIT (OUTPATIENT)
Dept: INTERNAL MEDICINE | Facility: CLINIC | Age: 62
End: 2017-03-16
Payer: COMMERCIAL

## 2017-03-16 VITALS
HEIGHT: 69 IN | HEART RATE: 73 BPM | BODY MASS INDEX: 25.7 KG/M2 | DIASTOLIC BLOOD PRESSURE: 68 MMHG | SYSTOLIC BLOOD PRESSURE: 105 MMHG | RESPIRATION RATE: 18 BRPM | TEMPERATURE: 97 F | WEIGHT: 173.5 LBS | OXYGEN SATURATION: 98 %

## 2017-03-16 DIAGNOSIS — F10.10 ALCOHOL ABUSE: Primary | ICD-10-CM

## 2017-03-16 PROCEDURE — 1160F RVW MEDS BY RX/DR IN RCRD: CPT | Mod: S$GLB,,, | Performed by: FAMILY MEDICINE

## 2017-03-16 PROCEDURE — 99213 OFFICE O/P EST LOW 20 MIN: CPT | Mod: S$GLB,,, | Performed by: FAMILY MEDICINE

## 2017-03-16 PROCEDURE — 99999 PR PBB SHADOW E&M-EST. PATIENT-LVL III: CPT | Mod: PBBFAC,,, | Performed by: FAMILY MEDICINE

## 2017-03-16 RX ORDER — LORAZEPAM 1 MG/1
1 TABLET ORAL EVERY 6 HOURS PRN
Qty: 60 TABLET | Refills: 0 | Status: SHIPPED | OUTPATIENT
Start: 2017-03-16 | End: 2017-04-15

## 2017-03-16 NOTE — PROGRESS NOTES
"Subjective:       Patient ID: Barb Carson is a 61 y.o. male.    Chief Complaint: Follow-up    HPI  Here today to follow up on anxiety and recent ED visit for alcohol intoxication/withdrawl. He has been on zoloft and was taking 2 ativan per day to help with his anxiety. After running out of ativan he went on a binge of drinking. He has been told by his job that he needs to do something or he will lose his job and insurance. He has not yet called AA nor other resources but still has the papers i gave him at the last visit.     History reviewed. No pertinent family history.    Current Outpatient Prescriptions:     INV folate 1 MG tablet, Take 1 tablet (1 mg total) by mouth once daily., Disp: 30 tablet, Rfl: 5    lorazepam (ATIVAN) 1 MG tablet, Take 1 tablet (1 mg total) by mouth every 6 (six) hours as needed for Anxiety., Disp: 60 tablet, Rfl: 0    multivitamin (THERAGRAN) per tablet, Take 1 tablet by mouth once daily., Disp: , Rfl:     sertraline (ZOLOFT) 50 MG tablet, Take 1 tablet (50 mg total) by mouth once daily., Disp: 30 tablet, Rfl: 11    thiamine (VITAMIN B-1) 50 MG tablet, Take 1 tablet (50 mg total) by mouth once daily., Disp: , Rfl:     Review of Systems   Constitutional: Negative for diaphoresis, fatigue and fever.   Respiratory: Negative for chest tightness.    Cardiovascular: Negative for chest pain.   Gastrointestinal: Negative for nausea.   Neurological: Negative for dizziness and weakness.   Psychiatric/Behavioral: The patient is nervous/anxious.        Objective:   /68 (BP Location: Left arm, Patient Position: Sitting, BP Method: Automatic)  Pulse 73  Temp 97 °F (36.1 °C)  Resp 18  Ht 5' 9" (1.753 m)  Wt 78.7 kg (173 lb 8 oz)  SpO2 98%  BMI 25.62 kg/m2     Physical Exam   Constitutional: He is oriented to person, place, and time. He appears well-developed and well-nourished.   HENT:   Head: Normocephalic and atraumatic.   Eyes: Conjunctivae are normal.   Cardiovascular: Normal " rate.    Pulmonary/Chest: Effort normal. No respiratory distress.   Musculoskeletal: He exhibits no edema.   Neurological: He is alert and oriented to person, place, and time. Coordination normal.   Skin: Skin is warm and dry. No rash noted.   Psychiatric: He has a normal mood and affect. His behavior is normal.   Vitals reviewed.      Assessment & Plan     1. Alcohol abuse  Encouraged him to get help from AA. Prescribed ativan to take as needed for anxiety/withdrawl symptoms. Encouraged him to continue the zoloft. Continue vitamins.  - Ambulatory referral to Outpatient Case Management

## 2017-03-16 NOTE — MR AVS SNAPSHOT
[facility]  [address] [address] [address]  Phone: [phone]                  Barb Carson   3/16/2017 4:00 PM   Office Visit    Description:  Male : 1955   Provider:  Tony Paul DO   Department:  [facility]           Reason for Visit     Follow-up           Diagnoses this Visit        Comments    Alcohol abuse    -  Primary            To Do List           Future Appointments        Provider Department Dept Phone    2017 1:00 PM Tony Paul DO [facility] [phone]      Goals (5 Years of Data)     None      Follow-Up and Disposition     Return in about 4 weeks (around 2017).       These Medications        Disp Refills Start End    lorazepam (ATIVAN) 1 MG tablet 60 tablet 0 3/16/2017 4/15/2017    Take 1 tablet (1 mg total) by mouth every 6 (six) hours as needed for Anxiety. - Oral    Pharmacy: [facility] [address] #: [phone]         [facility] On Call     [facility] On Call Nurse Care Line -  Assistance  Registered nurses in the [facility] On Call Center provide clinical advisement, health education, appointment booking, and other advisory services.  Call for this free service at [phone].             Medications           Message regarding Medications     Verify the changes and/or additions to your medication regime listed below are the same as discussed with your clinician today.  If any of these changes or additions are incorrect, please notify your healthcare provider.             Verify that the below list of medications is an accurate representation of the medications you are currently taking.  If none reported, the list may be blank. If incorrect, please contact your healthcare provider. Carry this list with you in case of emergency.           Current Medications     INV folate 1 MG tablet Take 1 tablet (1 mg total) by mouth once daily.    lorazepam (ATIVAN)  "1 MG tablet Take 1 tablet (1 mg total) by mouth every 6 (six) hours as needed for Anxiety.    multivitamin (THERAGRAN) per tablet Take 1 tablet by mouth once daily.    sertraline (ZOLOFT) 50 MG tablet Take 1 tablet (50 mg total) by mouth once daily.    thiamine (VITAMIN B-1) 50 MG tablet Take 1 tablet (50 mg total) by mouth once daily.           Clinical Reference Information           Your Vitals Were     BP Pulse Temp Resp Height Weight    105/68 (BP Location: Left arm, Patient Position: Sitting, BP Method: Automatic) 73 97 °F (36.1 °C) 18 5' 9" (1.753 m) 78.7 kg (173 lb 8 oz)    SpO2 BMI             98% 25.62 kg/m2         Blood Pressure          Most Recent Value    BP  105/68      Allergies as of 3/16/2017     No Known Allergies      Immunizations Administered on Date of Encounter - 3/16/2017     None      Orders Placed During Today's Visit      Normal Orders This Visit    Ambulatory referral to Outpatient Case Management       Smoking Cessation     If you would like to quit smoking:   You may be eligible for free services if you are a [place] resident and started smoking cigarettes before September 1, 1988.  Call the Smoking Cessation Trust (SCT) toll free at (348) 144-2953 or (445) 847-5992.   Call 1-800-QUIT-NOW if you do not meet the above criteria.            Language Assistance Services     ATTENTION: Language assistance services are available, free of charge. Please call [phone].      ATENCIÓN: Si habla español, tiene a pearson disposición servicios gratuitos de asistencia lingüística. Llame al [phone].     CHÚ Ý: N?u b?n nói Ti?ng Vi?t, có các d?ch v? h? tr? ngôn ng? mi?n phí dành cho b?n. G?i s? [phone].         [facility] complies with applicable Federal civil rights laws and does not discriminate on the basis of race, color, national origin, age, disability, or sex.        "

## 2017-03-17 ENCOUNTER — TELEPHONE (OUTPATIENT)
Dept: INTERNAL MEDICINE | Facility: CLINIC | Age: 62
End: 2017-03-17

## 2017-03-17 ENCOUNTER — OUTPATIENT CASE MANAGEMENT (OUTPATIENT)
Dept: ADMINISTRATIVE | Facility: OTHER | Age: 62
End: 2017-03-17

## 2017-03-17 NOTE — TELEPHONE ENCOUNTER
----- Message from Samra Rhoades sent at 3/17/2017  4:28 PM CDT -----  Please note the following patients information has been forwarded to [employer] for Case Management or .    Please see the media section in patient's chart for additional details.    Please contact Outpatient Complex care Management at ext [phone] with any questions.    Thank you,      Samra Rhoades, SSC

## 2017-03-17 NOTE — PROGRESS NOTES
Please note the following patients information has been forwarded to [facility] for Case Management or .    Please see the media section in patient's chart for additional details.    Please contact Outpatient Complex care Management at ext [phone] with any questions.    Thank you,      Samra Rhoades, SSC

## 2017-03-17 NOTE — TELEPHONE ENCOUNTER
Please note the following patients information has been forwarded to [facility] for Case Management or .     Please see the media section in patient's chart for additional details.     Please contact Outpatient Complex care Management at ext [phone] with any questions.     Thank you,       Samra Rhoades, SSC

## 2017-04-23 ENCOUNTER — HOSPITAL ENCOUNTER (INPATIENT)
Facility: HOSPITAL | Age: 62
LOS: 3 days | Discharge: HOME | DRG: 392 | End: 2017-04-26
Attending: EMERGENCY MEDICINE | Admitting: FAMILY MEDICINE
Payer: COMMERCIAL

## 2017-04-23 DIAGNOSIS — F10.10 ALCOHOL ABUSE: ICD-10-CM

## 2017-04-23 DIAGNOSIS — E87.20 LACTIC ACIDOSIS: ICD-10-CM

## 2017-04-23 DIAGNOSIS — R11.0 NAUSEA: ICD-10-CM

## 2017-04-23 DIAGNOSIS — R53.83 FATIGUE: Primary | ICD-10-CM

## 2017-04-23 DIAGNOSIS — F10.20 ALCOHOLISM: ICD-10-CM

## 2017-04-23 DIAGNOSIS — K52.9 COLITIS: ICD-10-CM

## 2017-04-23 LAB
ALBUMIN SERPL BCP-MCNC: 4.8 G/DL
ALLENS TEST: ABNORMAL
ALP SERPL-CCNC: 88 U/L
ALT SERPL W/O P-5'-P-CCNC: 47 U/L
AMMONIA PLAS-SCNC: 36 UMOL/L
AMPHET+METHAMPHET UR QL: NEGATIVE
ANION GAP SERPL CALC-SCNC: 27 MMOL/L
APTT BLDCRRT: 23.9 SEC
AST SERPL-CCNC: 56 U/L
B-OH-BUTYR BLD STRIP-SCNC: 0.7 MMOL/L
BACTERIA #/AREA URNS AUTO: NORMAL /HPF
BARBITURATES UR QL SCN>200 NG/ML: NEGATIVE
BASOPHILS # BLD AUTO: 0.02 K/UL
BASOPHILS NFR BLD: 0.3 %
BENZODIAZ UR QL SCN>200 NG/ML: NEGATIVE
BILIRUB SERPL-MCNC: 1.6 MG/DL
BILIRUB UR QL STRIP: ABNORMAL
BUN SERPL-MCNC: 8 MG/DL
BZE UR QL SCN: NEGATIVE
CALCIUM SERPL-MCNC: 9.4 MG/DL
CANNABINOIDS UR QL SCN: NEGATIVE
CHLORIDE SERPL-SCNC: 97 MMOL/L
CLARITY UR REFRACT.AUTO: ABNORMAL
CO2 SERPL-SCNC: 16 MMOL/L
COLOR UR AUTO: YELLOW
CREAT SERPL-MCNC: 1 MG/DL
CREAT UR-MCNC: 269.4 MG/DL
DELSYS: ABNORMAL
DIFFERENTIAL METHOD: ABNORMAL
EOSINOPHIL # BLD AUTO: 0 K/UL
EOSINOPHIL NFR BLD: 0 %
ERYTHROCYTE [DISTWIDTH] IN BLOOD BY AUTOMATED COUNT: 14.2 %
EST. GFR  (AFRICAN AMERICAN): >60 ML/MIN/1.73 M^2
EST. GFR  (NON AFRICAN AMERICAN): >60 ML/MIN/1.73 M^2
ETHANOL SERPL-MCNC: 52 MG/DL
GLUCOSE SERPL-MCNC: 117 MG/DL
GLUCOSE UR QL STRIP: NEGATIVE
HCO3 UR-SCNC: 19.9 MMOL/L (ref 24–28)
HCT VFR BLD AUTO: 45.2 %
HGB BLD-MCNC: 16.4 G/DL
HGB UR QL STRIP: ABNORMAL
HYALINE CASTS UR QL AUTO: 0 /LPF
INR PPP: 1
KETONES UR QL STRIP: ABNORMAL
LACTATE SERPL-SCNC: 3.8 MMOL/L
LACTATE SERPL-SCNC: 9 MMOL/L
LEUKOCYTE ESTERASE UR QL STRIP: NEGATIVE
LIPASE SERPL-CCNC: 24 U/L
LYMPHOCYTES # BLD AUTO: 0.9 K/UL
LYMPHOCYTES NFR BLD: 13.3 %
MAGNESIUM SERPL-MCNC: 1.6 MG/DL
MCH RBC QN AUTO: 35.5 PG
MCHC RBC AUTO-ENTMCNC: 36.3 %
MCV RBC AUTO: 98 FL
METHADONE UR QL SCN>300 NG/ML: NEGATIVE
MICROSCOPIC COMMENT: NORMAL
MODE: ABNORMAL
MONOCYTES # BLD AUTO: 0.3 K/UL
MONOCYTES NFR BLD: 4.6 %
NEUTROPHILS # BLD AUTO: 5.6 K/UL
NEUTROPHILS NFR BLD: 81.8 %
NITRITE UR QL STRIP: NEGATIVE
OPIATES UR QL SCN: NEGATIVE
PCO2 BLDA: 32.9 MMHG (ref 35–45)
PCP UR QL SCN>25 NG/ML: NEGATIVE
PH SMN: 7.39 [PH] (ref 7.35–7.45)
PH UR STRIP: 5 [PH] (ref 5–8)
PLATELET # BLD AUTO: 151 K/UL
PMV BLD AUTO: 8.8 FL
PO2 BLDA: 37 MMHG (ref 40–60)
POC BE: -5 MMOL/L
POC SATURATED O2: 71 % (ref 95–100)
POCT GLUCOSE: 120 MG/DL (ref 70–110)
POTASSIUM SERPL-SCNC: 3.7 MMOL/L
PROT SERPL-MCNC: 8.2 G/DL
PROT UR QL STRIP: ABNORMAL
PROTHROMBIN TIME: 10.2 SEC
RBC # BLD AUTO: 4.62 M/UL
RBC #/AREA URNS AUTO: 2 /HPF (ref 0–4)
SAMPLE: ABNORMAL
SITE: ABNORMAL
SODIUM SERPL-SCNC: 140 MMOL/L
SP GR UR STRIP: >=1.03 (ref 1–1.03)
SQUAMOUS #/AREA URNS AUTO: 5 /HPF
TOXICOLOGY INFORMATION: NORMAL
TROPONIN I SERPL DL<=0.01 NG/ML-MCNC: <0.006 NG/ML
URN SPEC COLLECT METH UR: ABNORMAL
UROBILINOGEN UR STRIP-ACNC: ABNORMAL EU/DL
WBC # BLD AUTO: 6.78 K/UL
WBC #/AREA URNS AUTO: 3 /HPF (ref 0–5)

## 2017-04-23 PROCEDURE — 83690 ASSAY OF LIPASE: CPT

## 2017-04-23 PROCEDURE — 96367 TX/PROPH/DG ADDL SEQ IV INF: CPT

## 2017-04-23 PROCEDURE — 63600175 PHARM REV CODE 636 W HCPCS: Performed by: EMERGENCY MEDICINE

## 2017-04-23 PROCEDURE — 85025 COMPLETE CBC W/AUTO DIFF WBC: CPT

## 2017-04-23 PROCEDURE — 96361 HYDRATE IV INFUSION ADD-ON: CPT

## 2017-04-23 PROCEDURE — 25000003 PHARM REV CODE 250: Performed by: EMERGENCY MEDICINE

## 2017-04-23 PROCEDURE — 82962 GLUCOSE BLOOD TEST: CPT

## 2017-04-23 PROCEDURE — 11000001 HC ACUTE MED/SURG PRIVATE ROOM

## 2017-04-23 PROCEDURE — 93005 ELECTROCARDIOGRAM TRACING: CPT

## 2017-04-23 PROCEDURE — 96375 TX/PRO/DX INJ NEW DRUG ADDON: CPT

## 2017-04-23 PROCEDURE — 99900035 HC TECH TIME PER 15 MIN (STAT): Performed by: GENERAL PRACTICE

## 2017-04-23 PROCEDURE — 83605 ASSAY OF LACTIC ACID: CPT

## 2017-04-23 PROCEDURE — 82803 BLOOD GASES ANY COMBINATION: CPT | Performed by: GENERAL PRACTICE

## 2017-04-23 PROCEDURE — 25000003 PHARM REV CODE 250: Performed by: NURSE PRACTITIONER

## 2017-04-23 PROCEDURE — 80320 DRUG SCREEN QUANTALCOHOLS: CPT

## 2017-04-23 PROCEDURE — 82140 ASSAY OF AMMONIA: CPT

## 2017-04-23 PROCEDURE — 99285 EMERGENCY DEPT VISIT HI MDM: CPT | Mod: 25

## 2017-04-23 PROCEDURE — 81000 URINALYSIS NONAUTO W/SCOPE: CPT

## 2017-04-23 PROCEDURE — 96365 THER/PROPH/DIAG IV INF INIT: CPT

## 2017-04-23 PROCEDURE — 83735 ASSAY OF MAGNESIUM: CPT

## 2017-04-23 PROCEDURE — 83605 ASSAY OF LACTIC ACID: CPT | Mod: 91

## 2017-04-23 PROCEDURE — 85610 PROTHROMBIN TIME: CPT

## 2017-04-23 PROCEDURE — 80307 DRUG TEST PRSMV CHEM ANLYZR: CPT

## 2017-04-23 PROCEDURE — 25500020 PHARM REV CODE 255: Performed by: EMERGENCY MEDICINE

## 2017-04-23 PROCEDURE — 96366 THER/PROPH/DIAG IV INF ADDON: CPT

## 2017-04-23 PROCEDURE — 80053 COMPREHEN METABOLIC PANEL: CPT

## 2017-04-23 PROCEDURE — 96376 TX/PRO/DX INJ SAME DRUG ADON: CPT

## 2017-04-23 PROCEDURE — 84484 ASSAY OF TROPONIN QUANT: CPT

## 2017-04-23 PROCEDURE — 82570 ASSAY OF URINE CREATININE: CPT

## 2017-04-23 PROCEDURE — 82010 KETONE BODYS QUAN: CPT

## 2017-04-23 PROCEDURE — 93010 ELECTROCARDIOGRAM REPORT: CPT | Mod: ,,, | Performed by: NUCLEAR MEDICINE

## 2017-04-23 PROCEDURE — 85730 THROMBOPLASTIN TIME PARTIAL: CPT

## 2017-04-23 RX ORDER — DEXTROSE MONOHYDRATE AND SODIUM CHLORIDE 5; .9 G/100ML; G/100ML
INJECTION, SOLUTION INTRAVENOUS CONTINUOUS
Status: DISCONTINUED | OUTPATIENT
Start: 2017-04-23 | End: 2017-04-26 | Stop reason: HOSPADM

## 2017-04-23 RX ORDER — MORPHINE SULFATE 2 MG/ML
2 INJECTION, SOLUTION INTRAMUSCULAR; INTRAVENOUS EVERY 4 HOURS PRN
Status: DISCONTINUED | OUTPATIENT
Start: 2017-04-23 | End: 2017-04-26 | Stop reason: HOSPADM

## 2017-04-23 RX ORDER — LORAZEPAM 2 MG/ML
1 INJECTION INTRAMUSCULAR
Status: COMPLETED | OUTPATIENT
Start: 2017-04-23 | End: 2017-04-23

## 2017-04-23 RX ORDER — SODIUM CHLORIDE 9 MG/ML
1000 INJECTION, SOLUTION INTRAVENOUS
Status: COMPLETED | OUTPATIENT
Start: 2017-04-23 | End: 2017-04-23

## 2017-04-23 RX ORDER — ONDANSETRON 2 MG/ML
4 INJECTION INTRAMUSCULAR; INTRAVENOUS EVERY 12 HOURS PRN
Status: DISCONTINUED | OUTPATIENT
Start: 2017-04-23 | End: 2017-04-26 | Stop reason: HOSPADM

## 2017-04-23 RX ORDER — ONDANSETRON 2 MG/ML
4 INJECTION INTRAMUSCULAR; INTRAVENOUS
Status: COMPLETED | OUTPATIENT
Start: 2017-04-23 | End: 2017-04-23

## 2017-04-23 RX ORDER — LORAZEPAM 1 MG/1
1 TABLET ORAL
Status: DISCONTINUED | OUTPATIENT
Start: 2017-04-23 | End: 2017-04-23

## 2017-04-23 RX ORDER — LORAZEPAM 2 MG/ML
1 INJECTION INTRAMUSCULAR
Status: COMPLETED | OUTPATIENT
Start: 2017-04-23 | End: 2017-04-24

## 2017-04-23 RX ORDER — MORPHINE SULFATE 4 MG/ML
4 INJECTION, SOLUTION INTRAMUSCULAR; INTRAVENOUS EVERY 4 HOURS PRN
Status: DISCONTINUED | OUTPATIENT
Start: 2017-04-23 | End: 2017-04-26 | Stop reason: HOSPADM

## 2017-04-23 RX ORDER — THIAMINE HYDROCHLORIDE 100 MG/ML
100 INJECTION, SOLUTION INTRAMUSCULAR; INTRAVENOUS ONCE
Status: COMPLETED | OUTPATIENT
Start: 2017-04-23 | End: 2017-04-23

## 2017-04-23 RX ORDER — PANTOPRAZOLE SODIUM 40 MG/10ML
40 INJECTION, POWDER, LYOPHILIZED, FOR SOLUTION INTRAVENOUS DAILY
Status: DISCONTINUED | OUTPATIENT
Start: 2017-04-24 | End: 2017-04-26 | Stop reason: HOSPADM

## 2017-04-23 RX ADMIN — LORAZEPAM 1 MG: 2 INJECTION INTRAMUSCULAR; INTRAVENOUS at 09:04

## 2017-04-23 RX ADMIN — PIPERACILLIN SODIUM AND TAZOBACTAM SODIUM 4.5 G: 4; .5 INJECTION, POWDER, FOR SOLUTION INTRAVENOUS at 05:04

## 2017-04-23 RX ADMIN — DEXTROSE MONOHYDRATE AND SODIUM CHLORIDE: 5; .9 INJECTION, SOLUTION INTRAVENOUS at 07:04

## 2017-04-23 RX ADMIN — THIAMINE HYDROCHLORIDE 100 MG: 100 INJECTION, SOLUTION INTRAMUSCULAR; INTRAVENOUS at 04:04

## 2017-04-23 RX ADMIN — FOLIC ACID 1 MG: 5 INJECTION, SOLUTION INTRAMUSCULAR; INTRAVENOUS; SUBCUTANEOUS at 03:04

## 2017-04-23 RX ADMIN — SODIUM CHLORIDE 2000 ML: 0.9 INJECTION, SOLUTION INTRAVENOUS at 05:04

## 2017-04-23 RX ADMIN — LORAZEPAM 1 MG: 2 INJECTION INTRAMUSCULAR; INTRAVENOUS at 03:04

## 2017-04-23 RX ADMIN — PIPERACILLIN AND TAZOBACTAM 4.5 G: 4; .5 INJECTION, POWDER, LYOPHILIZED, FOR SOLUTION INTRAVENOUS; PARENTERAL at 10:04

## 2017-04-23 RX ADMIN — IOHEXOL 75 ML: 350 INJECTION, SOLUTION INTRAVENOUS at 04:04

## 2017-04-23 RX ADMIN — SODIUM CHLORIDE 1000 ML: 0.9 INJECTION, SOLUTION INTRAVENOUS at 03:04

## 2017-04-23 RX ADMIN — ONDANSETRON 4 MG: 2 INJECTION INTRAMUSCULAR; INTRAVENOUS at 03:04

## 2017-04-23 NOTE — IP AVS SNAPSHOT
[address] [place] [address]           Patient Discharge Instructions   Our goal is to set you up for success. This packet includes information on your condition, medications, and your home care.  It will help you care for yourself to prevent having to return to the hospital.     Please ask your nurse if you have any questions.      There are many details to remember when preparing to leave the hospital. Here is what you will need to do:    1. Take your medicine. If you are prescribed medications, review your Medication List on the following pages. You may have new medications to  at the pharmacy and others that you'll need to stop taking. Review the instructions for how and when to take your medications. Talk with your doctor or nurses if you are unsure of what to do.     2. Go to your follow-up appointments. Specific follow-up information is listed in the following pages. Your may be contacted by a nurse or clinical provider about future appointments. Be sure we have all of the phone numbers to reach you. Please contact your provider's office if you are unable to make an appointment.     3. Watch for warning signs. Your doctor or nurse will give you detailed warning signs to watch for and when to call for assistance. These instructions may also include educational information about your condition. If you experience any of warning signs to your health, call your doctor.               ** Verify the list of medication(s) below is accurate and up to date. Carry this with you in case of emergency. If your medications have changed, please notify your healthcare provider.             Medication List      START taking these medications        Additional Info                      ciprofloxacin HCl 500 MG tablet   Commonly known as:  CIPRO   Quantity:  10 tablet   Refills:  0   Dose:  500 mg    Instructions:  Take 1 tablet (500 mg total) by mouth every 12 (twelve)  hours.     Begin Date    AM    Noon    PM    Bedtime       metronidazole 500 MG tablet   Commonly known as:  FLAGYL   Quantity:  15 tablet   Refills:  0   Dose:  500 mg    Instructions:  Take 1 tablet (500 mg total) by mouth every 8 (eight) hours.     Begin Date    AM    Noon    PM    Bedtime         CHANGE how you take these medications        Additional Info                      * lorazepam 1 MG tablet   Commonly known as:  ATIVAN   Quantity:  60 tablet   Refills:  0   Dose:  1 mg   What changed:  Another medication with the same name was added. Make sure you understand how and when to take each.    Last time this was given:  1 mg on 4/26/2017  5:43 AM   Instructions:  Take 1 tablet (1 mg total) by mouth every 6 (six) hours as needed for Anxiety.     Begin Date    AM    Noon    PM    Bedtime       * lorazepam 1 MG tablet   Commonly known as:  ATIVAN   Quantity:  12 tablet   Refills:  0   Dose:  1 mg   What changed:  You were already taking a medication with the same name, and this prescription was added. Make sure you understand how and when to take each.    Last time this was given:  1 mg on 4/26/2017  5:43 AM   Instructions:  Take 1 tablet (1 mg total) by mouth every 8 (eight) hours as needed for Anxiety.     Begin Date    AM    Noon    PM    Bedtime       * Notice:  This list has 2 medication(s) that are the same as other medications prescribed for you. Read the directions carefully, and ask your doctor or other care provider to review them with you.      CONTINUE taking these medications        Additional Info                      INV folate 1 MG tablet   Quantity:  30 tablet   Refills:  5   Dose:  1 mg    Instructions:  Take 1 tablet (1 mg total) by mouth once daily.     Begin Date    AM    Noon    PM    Bedtime       multivitamin per tablet   Commonly known as:  THERAGRAN   Refills:  0   Dose:  1 tablet    Instructions:  Take 1 tablet by mouth once daily.     Begin Date    AM    Noon    PM    Bedtime        sertraline 50 MG tablet   Commonly known as:  ZOLOFT   Quantity:  30 tablet   Refills:  11   Dose:  50 mg    Instructions:  Take 1 tablet (50 mg total) by mouth once daily.     Begin Date    AM    Noon    PM    Bedtime       thiamine 50 MG tablet   Commonly known as:  VITAMIN B-1   Quantity:  30 tablet   Refills:  1   Dose:  50 mg    Instructions:  Take 1 tablet (50 mg total) by mouth once daily.     Begin Date    AM    Noon    PM    Bedtime            Where to Get Your Medications      These medications were sent to [email or web address]  [place] - [address] [place] [address]     Phone:  [phone]     ciprofloxacin HCl 500 MG tablet    metronidazole 500 MG tablet    thiamine 50 MG tablet         You can get these medications from any pharmacy     Bring a paper prescription for each of these medications     lorazepam 1 MG tablet                  Please bring to all follow up appointments:    1. A copy of your discharge instructions.  2. All medicines you are currently taking in their original bottles.  3. Identification and insurance card.    Please arrive 15 minutes ahead of scheduled appointment time.    Please call 24 hours in advance if you must reschedule your appointment and/or time.        Your Scheduled Appointments     May 03, 2017 10:00 AM [facility] Follow Up with Tony Paul DO   [facility] ([facility])    [address] [place] [address] [id number] [phone]              Follow-up Information     Follow up with Tony Paul DO In 1 week.    Specialty:  Family Medicine    Contact information:    [address] [place] [address]  [phone]          Discharge Instructions     Future Orders    Activity as tolerated     Diet general     Questions:    Total calories:      Fat restriction, if any:      Protein restriction, if any:      Na restriction, if any:      Fluid restriction:      Additional restrictions:        Discharge  "References/Attachments     CIPROFLOXACIN TABLETS (ENGLISH)    METRONIDAZOLE TABLETS OR CAPSULES (ENGLISH)    ULCERATIVE COLITIS (ENGLISH)        Primary Diagnosis     Your primary diagnosis was:  Inflammation Of Large Intestine      Admission Information     Date & Time Provider Department CSN    4/23/2017  2:32 PM Raz Canales MD [facility] -  [id number]      Care Providers     Provider Role Specialty Primary office phone    Raz Canales MD Attending Provider Internal Medicine [phone]    Raz Canales MD Team Attending  Internal Medicine [phone]      Your Vitals Were     BP Pulse Temp Resp Height Weight    113/78 (BP Location: Right arm, Patient Position: Lying, BP Method: Automatic) 61 97.7 °F (36.5 °C) (Oral) 18 5' 9" (1.753 m) 77.1 kg (170 lb)    SpO2 BMI             99% 25.1 kg/m2         Recent Lab Values     No lab values to display.      Pending Labs     Order Current Status    E. coli 0157 antigen In process    Stool culture In process      Allergies as of 4/26/2017     No Known Allergies      [facility]     [facility] Nurse Care Line - 24/7 Assistance  Unless otherwise directed by your provider, please contact [facility], our nurse care line that is available for 24/7 assistance.     Registered nurses in the [facility] Center provide clinical advisement, health education, appointment booking, and other advisory services.  Call for this free service at [phone].        Advance Directives     An advance directive is a document which, in the event you are no longer able to make decisions for yourself, tells your healthcare team what kind of treatment you do or do not want to receive, or who you would like to make those decisions for you.  If you do not currently have an advance directive, [facility] encourages you to create one.  For more information call:  [phone]), [phone] ([phone]),  or log on to " [email or web address].        Smoking Cessation     If you would like to quit smoking:   You may be eligible for free services if you are a [place] resident and started smoking cigarettes before September 1, 1988.  Call the Smoking Cessation Trust (SCT) toll free at (329) 788-1948 or (330) 807-4118.   Call 1-800-QUIT-NOW if you do not meet the above criteria.   Contact us via email: [email or web address]   View our website for more information: [email or web address]        Language Assistance Services     ATTENTION: Language assistance services are available, free of charge. Please call [phone].      ATENCIÓN: Si habla español, tiene a pearson disposición servicios gratuitos de asistencia lingüística. Llame al [phone].     CHÚ Ý: N?u b?n nói Ti?ng Vi?t, có các d?ch v? h? tr? ngôn ng? mi?n phí dành cho b?n. G?i s? [phone].         [facility] complies with applicable Federal civil rights laws and does not discriminate on the basis of race, color, national origin, age, disability, or sex.

## 2017-04-23 NOTE — ED PROVIDER NOTES
Encounter Date: 4/23/2017       History     Chief Complaint   Patient presents with    Fatigue     pt feeling weak, has blood in urine 3 days.also nausea and abd pain    Hematuria    Abdominal Pain     Review of patient's allergies indicates:  No Known Allergies  HPI Comments: 62 y/o male with history of alcohol abuse - drinks one  5th of whiskey daily here with complaint diffuse abdominal pain and dark urine with fatigue and nausea worse for the past few days. Pt reports his last drink was this AM    Patient is a 61 y.o. male presenting with the following complaint: abdominal pain. The history is provided by the patient and a significant other.   Abdominal Pain   The current episode started several days ago. The onset of the illness was gradual. The abdominal pain is generalized. The abdominal pain does not radiate. The severity of the abdominal pain is 6/10. The abdominal pain is relieved by nothing. The abdominal pain is exacerbated by vomiting. The other symptoms of the illness include fatigue, nausea and vomiting.   Nausea began 3 to 5 days ago. The nausea is associated with alcohol use.   The vomiting began more than 2 days ago. The emesis contains stomach contents.   The illness is associated with alcohol use. The patient has not had a change in bowel habit. Symptoms associated with the illness do not include chills, anorexia, diaphoresis, heartburn, constipation, urgency, hematuria, frequency or back pain. Significant associated medical issues do not include PUD, GERD, inflammatory bowel disease, diabetes, sickle cell disease, gallstones, liver disease, substance abuse, diverticulitis, HIV or cardiac disease.     Past Medical History:   Diagnosis Date    Alcohol abuse     Anxiety      History reviewed. No pertinent surgical history.  History reviewed. No pertinent family history.  Social History   Substance Use Topics    Smoking status: Current Every Day Smoker     Packs/day: 2.00     Types: Cigarettes     Smokeless tobacco: None    Alcohol use Yes      Comment: daily- fifth     Review of Systems   Constitutional: Positive for fatigue. Negative for chills and diaphoresis.   Gastrointestinal: Positive for abdominal pain, nausea and vomiting. Negative for anorexia, constipation and heartburn.   Genitourinary: Negative for frequency, hematuria and urgency.   Musculoskeletal: Negative for back pain.   All other systems reviewed and are negative.      Physical Exam   Initial Vitals   BP Pulse Resp Temp SpO2   04/23/17 1429 04/23/17 1429 04/23/17 1429 04/23/17 1429 04/23/17 1429   124/89 97 20 98.3 °F (36.8 °C) 97 %     Physical Exam    Nursing note and vitals reviewed.  Constitutional: He appears well-developed and well-nourished. He appears distressed.   axious male here with diffuse abdominal pain and nausea   HENT:   Head: Normocephalic and atraumatic.   Eyes: Conjunctivae and EOM are normal. Pupils are equal, round, and reactive to light.   Neck: Normal range of motion. Neck supple.   Cardiovascular: Normal rate, regular rhythm, normal heart sounds and intact distal pulses. Exam reveals no gallop and no friction rub.    No murmur heard.  Pulmonary/Chest: Breath sounds normal. No respiratory distress.   Abdominal: Soft. Bowel sounds are normal. He exhibits no distension. There is tenderness. There is guarding. There is no rebound.   Diffuse upper abdominal pain to palpation with guarding no rebound    Musculoskeletal: Normal range of motion.   Neurological: He is alert and oriented to person, place, and time. He has normal strength and normal reflexes.   Skin: Skin is warm and dry.   Psychiatric: He has a normal mood and affect. His behavior is normal. Judgment and thought content normal.         ED Course   Procedures  Labs Reviewed   CBC W/ AUTO DIFFERENTIAL - Abnormal; Notable for the following:        Result Value    MCH 35.5 (*)     MCHC 36.3 (*)     MPV 8.8 (*)     Lymph # 0.9 (*)     Gran% 81.8 (*)      Lymph% 13.3 (*)     All other components within normal limits   URINALYSIS - Abnormal; Notable for the following:     Appearance, UA Hazy (*)     Specific Gravity, UA >=1.030 (*)     Protein, UA 2+ (*)     Ketones, UA Trace (*)     Bilirubin (UA) 1+ (*)     Occult Blood UA Trace (*)     Urobilinogen, UA 2.0-3.0 (*)     All other components within normal limits   BETA - HYDROXYBUTYRATE, SERUM - Abnormal; Notable for the following:     Beta-Hydroxybutyrate 0.7 (*)     All other components within normal limits   LACTIC ACID, PLASMA - Abnormal; Notable for the following:     Lactate (Lactic Acid) 9.0 (*)     All other components within normal limits    Narrative:      Lactic Acid critical result(s) called and verbal readback obtained   from Aniya Das 4/23/17 @ [facility].  , 04/23/2017 15:54   ISTAT PROCEDURE - Abnormal; Notable for the following:     POC PCO2 32.9 (*)     POC PO2 37 (*)     POC HCO3 19.9 (*)     POC SATURATED O2 71 (*)     All other components within normal limits   APTT   PROTIME-INR   DRUG SCREEN PANEL, URINE EMERGENCY   URINALYSIS MICROSCOPIC   COMPREHENSIVE METABOLIC PANEL   LIPASE   MAGNESIUM   AMMONIA   POCT GLUCOSE MONITORING CONTINUOUS              Vitals:    04/23/17 1429   BP: 124/89   Pulse: 97   Resp: 20   Temp: 98.3 °F (36.8 °C)       Imaging Results         CT Abdomen Pelvis With Contrast (In process)         X-Ray Chest AP Portable (Final result) Result time:  04/23/17 15:50:05    Final result by Alejandro Elizabeth MD (04/23/17 15:50:05)    Impression:          1.  Negative for acute process involving the chest.  2.  Stable findings as noted above.      Electronically signed by: ALEJANDRO ELIZABETH MD  Date:     04/23/17  Time:    15:50     Narrative:    Portable Chest x-ray    Clinical Indication: Weakness.  Fatigue.  Malaise.       Findings:     Comparisons are made to 10/25/2016.  EKG leads overlie the chest which is lordotic in position.     The lungs are clear. The cardiac silhouette size is  normal. The trachea is midline and the mediastinal width is normal. Negative for focal infiltrate, effusion or pneumothorax. Pulmonary vasculature is normal. Negative for osseous abnormalities.   Mild S-shaped curvature of the spine. There are calcifications of the aortic knob and tortuosity of the descending thoracic aorta.  Old right rib fracture again seen.              Results for orders placed or performed during the hospital encounter of 04/23/17   CBC auto differential   Result Value Ref Range    WBC 6.78 3.90 - 12.70 K/uL    RBC 4.62 4.60 - 6.20 M/uL    Hemoglobin 16.4 14.0 - 18.0 g/dL    Hematocrit 45.2 40.0 - 54.0 %    MCV 98 82 - 98 fL    MCH 35.5 (H) 27.0 - 31.0 pg    MCHC 36.3 (H) 32.0 - 36.0 %    RDW 14.2 11.5 - 14.5 %    Platelets 151 150 - 350 K/uL    MPV 8.8 (L) 9.2 - 12.9 fL    Gran # 5.6 1.8 - 7.7 K/uL    Lymph # 0.9 (L) 1.0 - 4.8 K/uL    Mono # 0.3 0.3 - 1.0 K/uL    Eos # 0.0 0.0 - 0.5 K/uL    Baso # 0.02 0.00 - 0.20 K/uL    Gran% 81.8 (H) 38.0 - 73.0 %    Lymph% 13.3 (L) 18.0 - 48.0 %    Mono% 4.6 4.0 - 15.0 %    Eosinophil% 0.0 0.0 - 8.0 %    Basophil% 0.3 0.0 - 1.9 %    Differential Method Automated    Comprehensive metabolic panel   Result Value Ref Range    Sodium 140 136 - 145 mmol/L    Potassium 3.7 3.5 - 5.1 mmol/L    Chloride 97 95 - 110 mmol/L    CO2 16 (L) 23 - 29 mmol/L    Glucose 117 (H) 70 - 110 mg/dL    BUN, Bld 8 6 - 20 mg/dL    Creatinine 1.0 0.5 - 1.4 mg/dL    Calcium 9.4 8.7 - 10.5 mg/dL    Total Protein 8.2 6.0 - 8.4 g/dL    Albumin 4.8 3.5 - 5.2 g/dL    Total Bilirubin 1.6 (H) 0.1 - 1.0 mg/dL    Alkaline Phosphatase 88 55 - 135 U/L    AST 56 (H) 10 - 40 U/L    ALT 47 (H) 10 - 44 U/L    Anion Gap 27 (H) 8 - 16 mmol/L    eGFR if African American >60.0 >60 mL/min/1.73 m^2    eGFR if non African American >60.0 >60 mL/min/1.73 m^2   Lipase   Result Value Ref Range    Lipase 24 4 - 60 U/L   APTT   Result Value Ref Range    aPTT 23.9 21.0 - 32.0 sec   Protime-INR   Result Value Ref  Range    Prothrombin Time 10.2 9.0 - 12.5 sec    INR 1.0 0.8 - 1.2   Urinalysis   Result Value Ref Range    Specimen UA Urine, Clean Catch     Color, UA Yellow Yellow, Straw, Daily    Appearance, UA Hazy (A) Clear    pH, UA 5.0 5.0 - 8.0    Specific Gravity, UA >=1.030 (A) 1.005 - 1.030    Protein, UA 2+ (A) Negative    Glucose, UA Negative Negative    Ketones, UA Trace (A) Negative    Bilirubin (UA) 1+ (A) Negative    Occult Blood UA Trace (A) Negative    Nitrite, UA Negative Negative    Urobilinogen, UA 2.0-3.0 (A) <2.0 EU/dL    Leukocytes, UA Negative Negative   Beta - Hydroxybutyrate, Serum   Result Value Ref Range    Beta-Hydroxybutyrate 0.7 (H) 0.0 - 0.5 mmol/L   Drug screen panel, emergency   Result Value Ref Range    Benzodiazepines Negative     Methadone metabolites Negative     Cocaine (Metab.) Negative     Opiate Scrn, Ur Negative     Barbiturate Screen, Ur Negative     Amphetamine Screen, Ur Negative     THC Negative     Phencyclidine Negative     Creatinine, Random Ur 269.4 23.0 - 375.0 mg/dL    Toxicology Information SEE COMMENT    Magnesium   Result Value Ref Range    Magnesium 1.6 1.6 - 2.6 mg/dL   Lactic acid, plasma   Result Value Ref Range    Lactate (Lactic Acid) 9.0 (HH) 0.5 - 2.2 mmol/L   Urinalysis Microscopic   Result Value Ref Range    RBC, UA 2 0 - 4 /hpf    WBC, UA 3 0 - 5 /hpf    Bacteria, UA Occasional None-Occ /hpf    Squam Epithel, UA 5 /hpf    Hyaline Casts, UA 0 0-1/lpf /lpf    Microscopic Comment SEE COMMENT    Ammonia   Result Value Ref Range    Ammonia 36 10 - 50 umol/L   Troponin I   Result Value Ref Range    Troponin I <0.006 0.000 - 0.026 ng/mL   ISTAT PROCEDURE   Result Value Ref Range    POC PH 7.391 7.35 - 7.45    POC PCO2 32.9 (L) 35 - 45 mmHg    POC PO2 37 (LL) 40 - 60 mmHg    POC HCO3 19.9 (L) 24 - 28 mmol/L    POC BE -5 -2 to 2 mmol/L    POC SATURATED O2 71 (L) 95 - 100 %    Sample VENOUS     Site Other     Allens Test N/A     DelSys Room Air     Mode SPONT                   Vitals:    04/23/17 1429   BP: 124/89   Pulse: 97   Resp: 20   Temp: 98.3 °F (36.8 °C)       Imaging Results         CT Abdomen Pelvis With Contrast (Final result) Result time:  04/23/17 17:08:30    Final result by Alphonse Meza Jr., MD (04/23/17 17:08:30)    Impression:      1. There is colonic wall thickening of the ascending, transverse, and descending colon with mild surrounding inflammatory change, most extensive involving the ascending colon.  This is consistent with colitis including infectious amongst other etiologies. No evidence of appendicitis.    2.  There is hepatomegaly with fatty infiltration of the liver.    3.  The gallbladder is distended without surrounding inflammation or abnormal biliary ductal dilation.    4.  Small hiatal hernia.        Electronically signed by: ALPHONSE MEZA M.D.  Date:     04/23/17  Time:    17:08     Narrative:    CT ABDOMEN PELVIS WITH CONTRAST    Clinical history: Lower abdominal pain    TECHNIQUE: Routine abdominal and pelvic CT performed after the IV administration of 75 mL Omnipaque 350. Coronal and sagittal images obtained. All CT scans at this facility use dose modulation, iterative reconstruction, and/or weight based dosing when appropriate to reduce radiation dose to as low as reasonably achievable.    Comparison: None    Findings: No acute disease is seen in the lung bases.  No acute osseous abnormality.  Scoliosis of the lumbar spine with convexity toward the right at the level of L2-L3.     There is a small hiatal hernia.  The stomach and small bowel are not unusual in appearance.  There is inflammatory change involving the ascending colon with colonic wall thickening.  The transverse colon and descending colon are also collapsed and demonstrate mild wall thickening. No evidence of appendicitis. No free intraperitoneal fluid, free air or abscess identified.    The liver is mildly enlarged and demonstrates fatty infiltration.  No focal liver lesion. The  gallbladder is distended.  No inflammatory change about the gallbladder. The pancreas, spleen and adrenals are unremarkable. No aortic aneurysm is identified. The kidneys and ureters are unremarkable. The bladder is within normal limits. Reproductive organs are normal in appearance. No pathologically enlarged lymph nodes are identified.            X-Ray Chest AP Portable (Final result) Result time:  04/23/17 15:50:05    Final result by Alejandro Elizabeth MD (04/23/17 15:50:05)    Impression:          1.  Negative for acute process involving the chest.  2.  Stable findings as noted above.      Electronically signed by: ALEJANDRO ELIZABETH MD  Date:     04/23/17  Time:    15:50     Narrative:    Portable Chest x-ray    Clinical Indication: Weakness.  Fatigue.  Malaise.       Findings:     Comparisons are made to 10/25/2016.  EKG leads overlie the chest which is lordotic in position.     The lungs are clear. The cardiac silhouette size is normal. The trachea is midline and the mediastinal width is normal. Negative for focal infiltrate, effusion or pneumothorax. Pulmonary vasculature is normal. Negative for osseous abnormalities.   Mild S-shaped curvature of the spine. There are calcifications of the aortic knob and tortuosity of the descending thoracic aorta.  Old right rib fracture again seen.              Results for orders placed or performed during the hospital encounter of 04/23/17   CBC auto differential   Result Value Ref Range    WBC 6.78 3.90 - 12.70 K/uL    RBC 4.62 4.60 - 6.20 M/uL    Hemoglobin 16.4 14.0 - 18.0 g/dL    Hematocrit 45.2 40.0 - 54.0 %    MCV 98 82 - 98 fL    MCH 35.5 (H) 27.0 - 31.0 pg    MCHC 36.3 (H) 32.0 - 36.0 %    RDW 14.2 11.5 - 14.5 %    Platelets 151 150 - 350 K/uL    MPV 8.8 (L) 9.2 - 12.9 fL    Gran # 5.6 1.8 - 7.7 K/uL    Lymph # 0.9 (L) 1.0 - 4.8 K/uL    Mono # 0.3 0.3 - 1.0 K/uL    Eos # 0.0 0.0 - 0.5 K/uL    Baso # 0.02 0.00 - 0.20 K/uL    Gran% 81.8 (H) 38.0 - 73.0 %    Lymph% 13.3 (L)  18.0 - 48.0 %    Mono% 4.6 4.0 - 15.0 %    Eosinophil% 0.0 0.0 - 8.0 %    Basophil% 0.3 0.0 - 1.9 %    Differential Method Automated    Comprehensive metabolic panel   Result Value Ref Range    Sodium 140 136 - 145 mmol/L    Potassium 3.7 3.5 - 5.1 mmol/L    Chloride 97 95 - 110 mmol/L    CO2 16 (L) 23 - 29 mmol/L    Glucose 117 (H) 70 - 110 mg/dL    BUN, Bld 8 6 - 20 mg/dL    Creatinine 1.0 0.5 - 1.4 mg/dL    Calcium 9.4 8.7 - 10.5 mg/dL    Total Protein 8.2 6.0 - 8.4 g/dL    Albumin 4.8 3.5 - 5.2 g/dL    Total Bilirubin 1.6 (H) 0.1 - 1.0 mg/dL    Alkaline Phosphatase 88 55 - 135 U/L    AST 56 (H) 10 - 40 U/L    ALT 47 (H) 10 - 44 U/L    Anion Gap 27 (H) 8 - 16 mmol/L    eGFR if African American >60.0 >60 mL/min/1.73 m^2    eGFR if non African American >60.0 >60 mL/min/1.73 m^2   Lipase   Result Value Ref Range    Lipase 24 4 - 60 U/L   APTT   Result Value Ref Range    aPTT 23.9 21.0 - 32.0 sec   Protime-INR   Result Value Ref Range    Prothrombin Time 10.2 9.0 - 12.5 sec    INR 1.0 0.8 - 1.2   Urinalysis   Result Value Ref Range    Specimen UA Urine, Clean Catch     Color, UA Yellow Yellow, Straw, Daily    Appearance, UA Hazy (A) Clear    pH, UA 5.0 5.0 - 8.0    Specific Gravity, UA >=1.030 (A) 1.005 - 1.030    Protein, UA 2+ (A) Negative    Glucose, UA Negative Negative    Ketones, UA Trace (A) Negative    Bilirubin (UA) 1+ (A) Negative    Occult Blood UA Trace (A) Negative    Nitrite, UA Negative Negative    Urobilinogen, UA 2.0-3.0 (A) <2.0 EU/dL    Leukocytes, UA Negative Negative   Beta - Hydroxybutyrate, Serum   Result Value Ref Range    Beta-Hydroxybutyrate 0.7 (H) 0.0 - 0.5 mmol/L   Drug screen panel, emergency   Result Value Ref Range    Benzodiazepines Negative     Methadone metabolites Negative     Cocaine (Metab.) Negative     Opiate Scrn, Ur Negative     Barbiturate Screen, Ur Negative     Amphetamine Screen, Ur Negative     THC Negative     Phencyclidine Negative     Creatinine, Random Ur 269.4 23.0  - 375.0 mg/dL    Toxicology Information SEE COMMENT    Magnesium   Result Value Ref Range    Magnesium 1.6 1.6 - 2.6 mg/dL   Lactic acid, plasma   Result Value Ref Range    Lactate (Lactic Acid) 9.0 (HH) 0.5 - 2.2 mmol/L   Urinalysis Microscopic   Result Value Ref Range    RBC, UA 2 0 - 4 /hpf    WBC, UA 3 0 - 5 /hpf    Bacteria, UA Occasional None-Occ /hpf    Squam Epithel, UA 5 /hpf    Hyaline Casts, UA 0 0-1/lpf /lpf    Microscopic Comment SEE COMMENT    Ammonia   Result Value Ref Range    Ammonia 36 10 - 50 umol/L   Troponin I   Result Value Ref Range    Troponin I <0.006 0.000 - 0.026 ng/mL   ISTAT PROCEDURE   Result Value Ref Range    POC PH 7.391 7.35 - 7.45    POC PCO2 32.9 (L) 35 - 45 mmHg    POC PO2 37 (LL) 40 - 60 mmHg    POC HCO3 19.9 (L) 24 - 28 mmol/L    POC BE -5 -2 to 2 mmol/L    POC SATURATED O2 71 (L) 95 - 100 %    Sample VENOUS     Site Other     Allens Test N/A     DelSys Room Air     Mode SPONT                 ED Course   530 PM - spoke to Domenica taking call for hospital medicine service- will admit pt for treatment of colitis and hydrate and treat nausea/vomiting and address pts request for obtaining help with alcoholism treatment   Clinical Impression:       ICD-10-CM ICD-9-CM   1. Fatigue R53.83 780.79   2. Nausea R11.0 787.02   3. Colitis K52.9 558.9   4. Lactic acidosis E87.2 276.2   5. Alcohol abuse F10.10 305.00   6. Alcoholism F10.20 303.90       Disposition:   Disposition: Admitted  Condition: Sofía Montoya Jr., MD  04/23/17 7291

## 2017-04-24 PROBLEM — D61.818 PANCYTOPENIA: Status: ACTIVE | Noted: 2017-04-24

## 2017-04-24 PROBLEM — E87.20 LACTIC ACIDOSIS: Status: ACTIVE | Noted: 2017-04-24

## 2017-04-24 PROBLEM — R17 TOTAL BILIRUBIN, ELEVATED: Status: ACTIVE | Noted: 2017-04-24

## 2017-04-24 LAB
ALBUMIN SERPL BCP-MCNC: 3.6 G/DL
ALP SERPL-CCNC: 64 U/L
ALT SERPL W/O P-5'-P-CCNC: 35 U/L
ANION GAP SERPL CALC-SCNC: 11 MMOL/L
AST SERPL-CCNC: 42 U/L
BASOPHILS # BLD AUTO: 0.02 K/UL
BASOPHILS NFR BLD: 0.7 %
BILIRUB SERPL-MCNC: 2.2 MG/DL
BUN SERPL-MCNC: 9 MG/DL
CALCIUM SERPL-MCNC: 8.3 MG/DL
CHLORIDE SERPL-SCNC: 102 MMOL/L
CO2 SERPL-SCNC: 27 MMOL/L
CREAT SERPL-MCNC: 0.8 MG/DL
DIFFERENTIAL METHOD: ABNORMAL
EOSINOPHIL # BLD AUTO: 0 K/UL
EOSINOPHIL NFR BLD: 0 %
ERYTHROCYTE [DISTWIDTH] IN BLOOD BY AUTOMATED COUNT: 14.8 %
EST. GFR  (AFRICAN AMERICAN): >60 ML/MIN/1.73 M^2
EST. GFR  (NON AFRICAN AMERICAN): >60 ML/MIN/1.73 M^2
GLUCOSE SERPL-MCNC: 104 MG/DL
HCT VFR BLD AUTO: 36.9 %
HGB BLD-MCNC: 13.4 G/DL
LACTATE SERPL-SCNC: 1.2 MMOL/L
LYMPHOCYTES # BLD AUTO: 0.8 K/UL
LYMPHOCYTES NFR BLD: 28 %
MCH RBC QN AUTO: 36.1 PG
MCHC RBC AUTO-ENTMCNC: 36.3 %
MCV RBC AUTO: 100 FL
MONOCYTES # BLD AUTO: 0.2 K/UL
MONOCYTES NFR BLD: 6.7 %
NEUTROPHILS # BLD AUTO: 1.7 K/UL
NEUTROPHILS NFR BLD: 64.6 %
PLATELET # BLD AUTO: 90 K/UL
PLATELET BLD QL SMEAR: ABNORMAL
PMV BLD AUTO: 9.1 FL
POCT GLUCOSE: 120 MG/DL (ref 70–110)
POTASSIUM SERPL-SCNC: 3.5 MMOL/L
PROT SERPL-MCNC: 6.2 G/DL
RBC # BLD AUTO: 3.71 M/UL
SODIUM SERPL-SCNC: 140 MMOL/L
WBC # BLD AUTO: 2.68 K/UL

## 2017-04-24 PROCEDURE — C9113 INJ PANTOPRAZOLE SODIUM, VIA: HCPCS | Performed by: EMERGENCY MEDICINE

## 2017-04-24 PROCEDURE — 63600175 PHARM REV CODE 636 W HCPCS: Performed by: HOSPITALIST

## 2017-04-24 PROCEDURE — 63600175 PHARM REV CODE 636 W HCPCS: Performed by: INTERNAL MEDICINE

## 2017-04-24 PROCEDURE — 99254 IP/OBS CNSLTJ NEW/EST MOD 60: CPT | Mod: ,,, | Performed by: INTERNAL MEDICINE

## 2017-04-24 PROCEDURE — 25000003 PHARM REV CODE 250: Performed by: FAMILY MEDICINE

## 2017-04-24 PROCEDURE — 21400001 HC TELEMETRY ROOM

## 2017-04-24 PROCEDURE — 25000003 PHARM REV CODE 250: Performed by: NURSE PRACTITIONER

## 2017-04-24 PROCEDURE — 80053 COMPREHEN METABOLIC PANEL: CPT

## 2017-04-24 PROCEDURE — 11000001 HC ACUTE MED/SURG PRIVATE ROOM

## 2017-04-24 PROCEDURE — 25000003 PHARM REV CODE 250: Performed by: EMERGENCY MEDICINE

## 2017-04-24 PROCEDURE — 87493 C DIFF AMPLIFIED PROBE: CPT

## 2017-04-24 PROCEDURE — 63600175 PHARM REV CODE 636 W HCPCS: Performed by: NURSE PRACTITIONER

## 2017-04-24 PROCEDURE — 83605 ASSAY OF LACTIC ACID: CPT

## 2017-04-24 PROCEDURE — 85025 COMPLETE CBC W/AUTO DIFF WBC: CPT

## 2017-04-24 PROCEDURE — 25000003 PHARM REV CODE 250: Performed by: INTERNAL MEDICINE

## 2017-04-24 PROCEDURE — 63600175 PHARM REV CODE 636 W HCPCS: Performed by: EMERGENCY MEDICINE

## 2017-04-24 PROCEDURE — 87449 NOS EACH ORGANISM AG IA: CPT

## 2017-04-24 PROCEDURE — 36415 COLL VENOUS BLD VENIPUNCTURE: CPT

## 2017-04-24 RX ORDER — CHLORDIAZEPOXIDE HYDROCHLORIDE 25 MG/1
25 CAPSULE, GELATIN COATED ORAL 3 TIMES DAILY
Status: DISCONTINUED | OUTPATIENT
Start: 2017-04-24 | End: 2017-04-26 | Stop reason: HOSPADM

## 2017-04-24 RX ORDER — CIPROFLOXACIN 2 MG/ML
400 INJECTION, SOLUTION INTRAVENOUS
Status: DISCONTINUED | OUTPATIENT
Start: 2017-04-24 | End: 2017-04-26 | Stop reason: HOSPADM

## 2017-04-24 RX ORDER — METRONIDAZOLE 500 MG/100ML
500 INJECTION, SOLUTION INTRAVENOUS
Status: DISCONTINUED | OUTPATIENT
Start: 2017-04-24 | End: 2017-04-26 | Stop reason: HOSPADM

## 2017-04-24 RX ORDER — LORAZEPAM 2 MG/ML
1 INJECTION INTRAMUSCULAR
Status: COMPLETED | OUTPATIENT
Start: 2017-04-24 | End: 2017-04-25

## 2017-04-24 RX ADMIN — PIPERACILLIN AND TAZOBACTAM 4.5 G: 4; .5 INJECTION, POWDER, LYOPHILIZED, FOR SOLUTION INTRAVENOUS; PARENTERAL at 05:04

## 2017-04-24 RX ADMIN — CHLORDIAZEPOXIDE HYDROCHLORIDE 25 MG: 25 CAPSULE ORAL at 01:04

## 2017-04-24 RX ADMIN — METRONIDAZOLE 500 MG: 500 INJECTION, SOLUTION INTRAVENOUS at 01:04

## 2017-04-24 RX ADMIN — CIPROFLOXACIN 400 MG: 2 INJECTION, SOLUTION INTRAVENOUS at 01:04

## 2017-04-24 RX ADMIN — DEXTROSE MONOHYDRATE AND SODIUM CHLORIDE: 5; .9 INJECTION, SOLUTION INTRAVENOUS at 11:04

## 2017-04-24 RX ADMIN — LORAZEPAM 1 MG: 2 INJECTION INTRAMUSCULAR; INTRAVENOUS at 03:04

## 2017-04-24 RX ADMIN — METRONIDAZOLE 500 MG: 500 INJECTION, SOLUTION INTRAVENOUS at 08:04

## 2017-04-24 RX ADMIN — LORAZEPAM 1 MG: 2 INJECTION INTRAMUSCULAR; INTRAVENOUS at 11:04

## 2017-04-24 RX ADMIN — DEXTROSE MONOHYDRATE AND SODIUM CHLORIDE: 5; .9 INJECTION, SOLUTION INTRAVENOUS at 12:04

## 2017-04-24 RX ADMIN — LORAZEPAM 1 MG: 2 INJECTION INTRAMUSCULAR; INTRAVENOUS at 09:04

## 2017-04-24 RX ADMIN — MORPHINE SULFATE 4 MG: 4 INJECTION, SOLUTION INTRAMUSCULAR; INTRAVENOUS at 09:04

## 2017-04-24 RX ADMIN — PANTOPRAZOLE SODIUM 40 MG: 40 INJECTION, POWDER, FOR SOLUTION INTRAVENOUS at 08:04

## 2017-04-24 RX ADMIN — FOLIC ACID: 5 INJECTION, SOLUTION INTRAMUSCULAR; INTRAVENOUS; SUBCUTANEOUS at 12:04

## 2017-04-24 RX ADMIN — LORAZEPAM 1 MG: 2 INJECTION INTRAMUSCULAR; INTRAVENOUS at 06:04

## 2017-04-24 NOTE — ASSESSMENT & PLAN NOTE
-Counseled;. Expressed concern to quit. Drinks 1/5 daily.  will be consulted for resources.  -daily banana bag to replete nutrients.   -Ativan prn. Will start Libirum as patient diet has been advanced  -Closely monitor for withdrawals

## 2017-04-24 NOTE — PLAN OF CARE
Pt remains free from injury/falls. Fall precautions in place. Pt refuses bed alarm. Pt independent with turning and getting in and out of bed. Remains NPO. Denies pain and nausea. IV fluids infusing as ordered. IV antibiotics switched to Cipro and Flagyl and given as scheduled. Pt also receiving IV ativan PRN for anxiety and pt was started on PO Librium TID. No signs of alcohol withdrawal symptoms. Pt able to verbalize needs/wants. Bed in low, locked position, call light and personal items within reach. VSS. Will cont to monitor.

## 2017-04-24 NOTE — PLAN OF CARE
"Problem: Patient Care Overview  Goal: Plan of Care Review  Outcome: Ongoing (interventions implemented as appropriate)  Pt remains free of injury, pain managed adequately, patient complains of intermittent nausea. No vomiting throughout shift. Patient has slight hand tremors, patient states that his tremors are"normal, but not as shaky as tonight".  no s/s of distress. 24 hour chart check completed. Will continue to monitor.           "

## 2017-04-24 NOTE — PLAN OF CARE
Problem: Patient Care Overview  Goal: Plan of Care Review  Outcome: Ongoing (interventions implemented as appropriate)  Pt remains free from injury/falls. Fall precautions in place. Pt refuses bed alarm. Pt independent with turning and getting in and out of bed.  Remains NPO. Denies pain and nausea. IV fluids infusing as ordered. IV antibiotics given as scheduled. No signs of alcohol withdrawal symptoms. Pt able to verbalize needs/wants. Bed in low, locked position, call light and personal items within reach. Daughter at bedside. VSS. Will cont to monitor.

## 2017-04-24 NOTE — CONSULTS
[facility] -   Gastroenterology  Consult Note    Patient Name: Barb Carson  MRN: [id number]  Admission Date: 4/23/2017  Hospital Length of Stay: 1 days  Code Status: Full Code   Attending Provider: Raz Canales MD   Consulting Provider: Waleska Gilmore PA-C  Primary Care Physician: Tony Paul DO  Principal Problem:<principal problem not specified>    Inpatient consult to Gastroenterology  Consult performed by: WALESKA GILMORE  Consult ordered by: ASHOK JAY  Reason for consult: Colitis        Subjective:     HPI:  The patient presented to the ER for fatigue, hematuria and abdominal pain. The patient told me he has been having symptoms off and on for several months. He reports diarrhea whenever he eats. He denies hemateochezia or melena. He says he hasn't eaten in 2-3 days. He also reported nausea and vomiting, but none today. He said there was red streaks, but vomiting was primarily non-bloody. His abdominal pain was generalized, but a little more prominent in the right abdomen. It is better today with treatment provided. He has a history of alcohol abuse. No prior diagnosis of cirrhosis. ER workup revealed , INR 1.0, BUN and creatinine normal, AST 56, ALt 47, Tbili 1.6, ALP 88 and ALB 4.8. Alcohol level 52. CT scan with contrast showed colonic wall thickening of the ascending, transverse, and descending colon with mild surrounding inflammatory changes around the ascending colon consistent with colitis. Transverse and descending were collapsed. There was also note of hepatomegaly with fatty infiltration of the liver. Lactate was initially 9.0, but down to 3.8 last night. He denies a change in medications or antibiotic use. No personal contacts with similar symptoms. He takes Ibuprofen a couple of times a week.     Past Medical History:   Diagnosis Date    Alcohol abuse     Anxiety        History reviewed. No pertinent surgical history.    Review of patient's allergies  indicates:  No Known Allergies  Family History     None        Social History Main Topics    Smoking status: Current Every Day Smoker     Packs/day: 2.00     Types: Cigarettes    Smokeless tobacco: Not on file    Alcohol use Yes      Comment: daily- fifth    Drug use: No    Sexual activity: No     Review of Systems   Constitutional: Negative for appetite change, fatigue and fever.   HENT: Negative for hearing loss and sore throat.    Eyes: Negative for visual disturbance.   Respiratory: Negative for cough, choking and shortness of breath.    Cardiovascular: Negative for chest pain and palpitations.   Gastrointestinal:        As per HPI.   Genitourinary: Positive for hematuria (dark urine). Negative for difficulty urinating, dysuria and frequency.   Musculoskeletal: Negative for arthralgias and back pain.   Skin: Negative for color change and rash.   Neurological: Negative for dizziness, seizures, syncope, weakness, numbness and headaches.   Hematological: Does not bruise/bleed easily.   Psychiatric/Behavioral: The patient is not nervous/anxious (reports h/o depression).      Objective:     Vital Signs (Most Recent):  Temp: 98.3 °F (36.8 °C) (04/24/17 0716)  Pulse: 62 (04/24/17 0716)  Resp: 18 (04/24/17 0716)  BP: 129/79 (04/24/17 0716)  SpO2: 98 % (04/24/17 0716) Vital Signs (24h Range):  Temp:  [97.8 °F (36.6 °C)-98.9 °F (37.2 °C)] 98.3 °F (36.8 °C)  Pulse:  [] 62  Resp:  [16-20] 18  SpO2:  [95 %-98 %] 98 %  BP: (105-131)/(62-89) 129/79     Weight: 77.1 kg (170 lb) (04/23/17 2219)  Body mass index is 25.1 kg/(m^2).      Intake/Output Summary (Last 24 hours) at 04/24/17 1143  Last data filed at 04/24/17 0500   Gross per 24 hour   Intake             4715 ml   Output                0 ml   Net             4715 ml       Lines/Drains/Airways     Peripheral Intravenous Line                 Peripheral IV - Single Lumen 04/23/17 1520 Right Antecubital less than 1 day                Physical Exam   Constitutional:  He is oriented to person, place, and time. He appears well-developed and well-nourished.   HENT:   Head: Normocephalic and atraumatic.   Eyes: EOM are normal.   Neck: Normal range of motion. Neck supple.   Cardiovascular: Normal rate, regular rhythm and normal heart sounds.    No murmur heard.  Pulmonary/Chest: Effort normal and breath sounds normal. No respiratory distress. He has no wheezes.   Abdominal: Soft. Bowel sounds are normal. He exhibits no distension and no mass. There is no hepatomegaly. There is no tenderness.   Musculoskeletal: He exhibits no edema.   Neurological: He is alert and oriented to person, place, and time. He displays tremor (noted in upper extremities). No cranial nerve deficit. Gait normal.   Skin: Skin is warm and dry. No rash noted.   Psychiatric: He has a normal mood and affect.       Significant Labs:  CBC:   Recent Labs  Lab 04/23/17  1520 04/24/17  0646   WBC 6.78 2.68*   HGB 16.4 13.4*   HCT 45.2 36.9*    90*     CMP:   Recent Labs  Lab 04/24/17  0646      CALCIUM 8.3*   ALBUMIN 3.6   PROT 6.2      K 3.5   CO2 27      BUN 9   CREATININE 0.8   ALKPHOS 64   ALT 35   AST 42*   BILITOT 2.2*       Significant Imaging:  Imaging results within the past 24 hours have been reviewed.    Assessment/Plan:     Colitis  62 yo male admitted with nausea, vomiting, abdominal pain and diarrhea. Noted to have right sided colitis on CT scan. The patient is feeling a little better today. Stool studies have been ordered and empiric antibiotics started. He will likely need an EGD and colonoscopy as an outpatient. Ok to start clear liquid diet. Agree with Protonix daily. Continue supportive care.    Alcohol abuse  No diagnosis of cirrhosis at this time. Discussed complications of alcohol abuse with the patient and his daughter and recommend alcohol cessation.   He will need prophylaxis to try and prevent withdrawal.       Thank you for your consult. I will follow-up with patient.  Please contact us if you have any additional questions.    Josh Gilmore PA-C  Gastroenterology  [facility]

## 2017-04-24 NOTE — ED NOTES
[facility] called and stated that there are some emergencies in the area and transportation has been delayed 45 minutes to an hour. They are currently working on getting a unit to the area.

## 2017-04-24 NOTE — SUBJECTIVE & OBJECTIVE
Past Medical History:   Diagnosis Date    Alcohol abuse     Anxiety        History reviewed. No pertinent surgical history.    Review of patient's allergies indicates:  No Known Allergies    No current facility-administered medications on file prior to encounter.      Current Outpatient Prescriptions on File Prior to Encounter   Medication Sig    INV folate 1 MG tablet Take 1 tablet (1 mg total) by mouth once daily.    sertraline (ZOLOFT) 50 MG tablet Take 1 tablet (50 mg total) by mouth once daily.    thiamine (VITAMIN B-1) 50 MG tablet Take 1 tablet (50 mg total) by mouth once daily.    lorazepam (ATIVAN) 1 MG tablet Take 1 tablet (1 mg total) by mouth every 6 (six) hours as needed for Anxiety.    multivitamin (THERAGRAN) per tablet Take 1 tablet by mouth once daily.     Family History     None        Social History Main Topics    Smoking status: Current Every Day Smoker     Packs/day: 2.00     Types: Cigarettes    Smokeless tobacco: Not on file    Alcohol use Yes      Comment: daily- fifth    Drug use: No    Sexual activity: No     Review of Systems   Constitutional: Negative.    Gastrointestinal: Positive for abdominal pain.   All other systems reviewed and are negative.    Objective:     Vital Signs (Most Recent):  Temp: 98.8 °F (37.1 °C) (04/24/17 0013)  Pulse: 71 (04/24/17 0013)  Resp: 18 (04/24/17 0013)  BP: 125/73 (04/24/17 0013)  SpO2: 98 % (04/24/17 0013) Vital Signs (24h Range):  Temp:  [97.8 °F (36.6 °C)-98.9 °F (37.2 °C)] 98.8 °F (37.1 °C)  Pulse:  [] 71  Resp:  [17-20] 18  SpO2:  [95 %-98 %] 98 %  BP: (124-131)/(62-89) 125/73     Weight: 77.2 kg (170 lb 2 oz)  Body mass index is 25.12 kg/(m^2).    Physical Exam   Constitutional: He is oriented to person, place, and time. He appears well-developed and well-nourished.   HENT:   Head: Normocephalic and atraumatic.   Nose: Nose normal.   Mouth/Throat: Oropharynx is clear and moist.   Eyes: Conjunctivae and EOM are normal. Pupils are  equal, round, and reactive to light.   Neck: Normal range of motion. Neck supple.   Cardiovascular: Normal rate, regular rhythm, normal heart sounds and intact distal pulses.    Pulmonary/Chest: Effort normal.   Abdominal: Soft. He exhibits no mass. There is tenderness. There is no rebound and no guarding.   Musculoskeletal: Normal range of motion.   Neurological: He is alert and oriented to person, place, and time. He has normal reflexes.   Skin: Skin is warm and dry.   Psychiatric: He has a normal mood and affect.   Nursing note and vitals reviewed.       Significant Labs:   CBC:   Recent Labs  Lab 04/23/17  1520   WBC 6.78   HGB 16.4   HCT 45.2        CMP:   Recent Labs  Lab 04/23/17  1520      K 3.7   CL 97   CO2 16*   *   BUN 8   CREATININE 1.0   CALCIUM 9.4   PROT 8.2   ALBUMIN 4.8   BILITOT 1.6*   ALKPHOS 88   AST 56*   ALT 47*   ANIONGAP 27*   EGFRNONAA >60.0     Cardiac Markers: No results for input(s): CKMB, MYOGLOBIN, BNP, TROPISTAT in the last 48 hours.  Coagulation:   Recent Labs  Lab 04/23/17  1520   INR 1.0   APTT 23.9     Lactic Acid:   Recent Labs  Lab 04/23/17  1520 04/23/17  1915   LACTATE 9.0* 3.8*       Significant Imaging: I have reviewed and interpreted all pertinent imaging results/findings within the past 24 hours.

## 2017-04-24 NOTE — PROGRESS NOTES
[facility] Hospital Medicine  Progress Note    Patient Name: Barb Carson  MRN: [id number]  Patient Class: IP- Inpatient   Admission Date: 4/23/2017  Length of Stay: 1 days  Attending Physician: Raz Canales MD  Primary Care Provider: Tony Paul DO    Subjective:     Principal Problem:Colitis    HPI:  Mr. Carson is a 60 yo WM with no known chronic medical problems presented to another facility complaining of severe abdominal pain. He does have a history of daily EtOH consumption and had a drink earlier today. He denies any N,V or diarrhea. CT done revealed pan-colitis and he had a lactic acid of 9.     Hospital Course:  Patient was admitted with Colitis and severe Acidosis. There were concerns of bowel ischemia. With further review of CT Abdomen and discussion with GI, it ascending colon shows evidence of colitis as wall thickening in transverse and descending could have been from collapsed area. Patient was aggressively hydrated and lactic acidosis improved. Patient admitted to episodes of nausea and diarrhea. He was placed on IV antibiotics and IV hydration. GI services has been consulted for recommendations.         Patient admits to chronic history of ETOH abuse (drinks 1/5 daily). He was treated with banana bag and benzos for withdrawal symptoms. Pancytopenia was felt to be related to bone marrow suppression from ETOH use. Patient verbalized interest in rehab and  has been consulted for this.       Interval History: patient reports abdominal pain is present but has significantly improved. Admits to vomiting and diarrhea. Last episode yesterday. GI has been consulted. Admits to tremors and requesting medications for ETOH withdrawal. Is requesting help with drinking.     Review of Systems   Constitutional: Positive for appetite change and fatigue. Negative for chills, diaphoresis and fever.   HENT: Negative for drooling, ear pain, rhinorrhea and sore throat.     Eyes: Negative.    Respiratory: Negative for cough, shortness of breath and wheezing.    Cardiovascular: Negative for palpitations and leg swelling.   Gastrointestinal: Positive for abdominal pain, diarrhea and vomiting. Negative for constipation and nausea.   Endocrine: Negative.    Genitourinary: Negative for dysuria, hematuria and urgency.   Musculoskeletal: Negative.    Skin: Negative for color change and wound.   Allergic/Immunologic: Negative.    Neurological: Positive for weakness. Negative for dizziness, syncope and speech difficulty.   Hematological: Negative.    Psychiatric/Behavioral: Negative.      Objective:     Vital Signs (Most Recent):  Temp: 98.3 °F (36.8 °C) (04/24/17 0716)  Pulse: 62 (04/24/17 0716)  Resp: 18 (04/24/17 0716)  BP: 129/79 (04/24/17 0716)  SpO2: 98 % (04/24/17 0716) Vital Signs (24h Range):  Temp:  [97.8 °F (36.6 °C)-98.9 °F (37.2 °C)] 98.3 °F (36.8 °C)  Pulse:  [] 62  Resp:  [16-20] 18  SpO2:  [95 %-98 %] 98 %  BP: (105-131)/(62-89) 129/79     Weight: 77.1 kg (170 lb)  Body mass index is 25.1 kg/(m^2).    Intake/Output Summary (Last 24 hours) at 04/24/17 1148  Last data filed at 04/24/17 0500   Gross per 24 hour   Intake             4715 ml   Output                0 ml   Net             4715 ml      Physical Exam   Constitutional: He is oriented to person, place, and time. He appears well-developed and well-nourished. No distress.   Appears anxious   HENT:   Head: Normocephalic and atraumatic.   Eyes: EOM are normal.   Neck: Normal range of motion. Neck supple.   Cardiovascular: Normal rate, regular rhythm and normal heart sounds.    Pulmonary/Chest: Effort normal and breath sounds normal. No respiratory distress.   Abdominal: Soft. Bowel sounds are normal. He exhibits no distension. There is no tenderness.   Musculoskeletal: Normal range of motion.   Neurological: He is alert and oriented to person, place, and time.   +tremors     Skin: Skin is dry.   Nursing note and  vitals reviewed.    Significant Labs:   CBC:   Recent Labs  Lab 04/23/17  1520 04/24/17  0646   WBC 6.78 2.68*   HGB 16.4 13.4*   HCT 45.2 36.9*    90*     CMP:   Recent Labs  Lab 04/23/17  1520 04/24/17  0646    140   K 3.7 3.5   CL 97 102   CO2 16* 27   * 104   BUN 8 9   CREATININE 1.0 0.8   CALCIUM 9.4 8.3*   PROT 8.2 6.2   ALBUMIN 4.8 3.6   BILITOT 1.6* 2.2*   ALKPHOS 88 64   AST 56* 42*   ALT 47* 35   ANIONGAP 27* 11   EGFRNONAA >60.0 >60       Significant Imaging:  Imaging Results         CT Abdomen Pelvis With Contrast (Final result) Result time:  04/23/17 17:08:30    Final result by Alphonse Meza Jr., MD (04/23/17 17:08:30)    Impression:      1. There is colonic wall thickening of the ascending, transverse, and descending colon with mild surrounding inflammatory change, most extensive involving the ascending colon.  This is consistent with colitis including infectious amongst other etiologies. No evidence of appendicitis.    2.  There is hepatomegaly with fatty infiltration of the liver.    3.  The gallbladder is distended without surrounding inflammation or abnormal biliary ductal dilation.    4.  Small hiatal hernia.        Electronically signed by: ALPHONSE MEZA M.D.  Date:     04/23/17  Time:    17:08     Narrative:    CT ABDOMEN PELVIS WITH CONTRAST    Clinical history: Lower abdominal pain    TECHNIQUE: Routine abdominal and pelvic CT performed after the IV administration of 75 mL Omnipaque 350. Coronal and sagittal images obtained. All CT scans at this facility use dose modulation, iterative reconstruction, and/or weight based dosing when appropriate to reduce radiation dose to as low as reasonably achievable.    Comparison: None    Findings: No acute disease is seen in the lung bases.  No acute osseous abnormality.  Scoliosis of the lumbar spine with convexity toward the right at the level of L2-L3.     There is a small hiatal hernia.  The stomach and small bowel are not  unusual in appearance.  There is inflammatory change involving the ascending colon with colonic wall thickening.  The transverse colon and descending colon are also collapsed and demonstrate mild wall thickening. No evidence of appendicitis. No free intraperitoneal fluid, free air or abscess identified.    The liver is mildly enlarged and demonstrates fatty infiltration.  No focal liver lesion. The gallbladder is distended.  No inflammatory change about the gallbladder. The pancreas, spleen and adrenals are unremarkable. No aortic aneurysm is identified. The kidneys and ureters are unremarkable. The bladder is within normal limits. Reproductive organs are normal in appearance. No pathologically enlarged lymph nodes are identified.            X-Ray Chest AP Portable (Final result) Result time:  04/23/17 15:50:05    Final result by Alejandro Elizabeth MD (04/23/17 15:50:05)    Impression:          1.  Negative for acute process involving the chest.  2.  Stable findings as noted above.      Electronically signed by: ALEJANDRO ELIZABETH MD  Date:     04/23/17  Time:    15:50     Narrative:    Portable Chest x-ray    Clinical Indication: Weakness.  Fatigue.  Malaise.       Findings:     Comparisons are made to 10/25/2016.  EKG leads overlie the chest which is lordotic in position.     The lungs are clear. The cardiac silhouette size is normal. The trachea is midline and the mediastinal width is normal. Negative for focal infiltrate, effusion or pneumothorax. Pulmonary vasculature is normal. Negative for osseous abnormalities.   Mild S-shaped curvature of the spine. There are calcifications of the aortic knob and tortuosity of the descending thoracic aorta.  Old right rib fracture again seen.                Assessment/Plan:      * Colitis  -given history of N/V/D will change antibiotics to IV Flagyl and Ciprofloxacin. Okay to advance to clear liquid diet  -GI consulted due to concern infectious  vs ischemic especially with an  elevated lactic acid  -Lactic acid has improved with IVF's. Will repeat again.  -stool studies have been ordered.    Alcohol abuse  -Counseled;. Expressed concern to quit. Drinks 1/5 daily.  will be consulted for resources.  -daily banana bag to replete nutrients.   -Ativan prn. Will start Libirum as patient diet has been advanced  -Closely monitor for withdrawals    Lactic acidosis  9>>3.8.   -Has improved with IVF's.  -bowel ischemia is a concern, but has improved.   -repeat again today      Tobacco use  -Counseled on smoking cessation      Pancytopenia  -due to bone marrow suppression from chronic ETOH. Closely monitor      Total bilirubin, elevated  -due to above. Closely Monitor      VTE Risk Mitigation         Ordered     Medium Risk of VTE  Once      04/23/17 2156     Reason for No Pharmacological VTE Prophylaxis  Once      04/23/17 2156     Place SUNITA hose  Until discontinued      04/23/17 2156     Place sequential compression device  Until discontinued      04/23/17 2156          Sandy Burger NP  Department of Hospital Medicine   [facility]

## 2017-04-24 NOTE — ASSESSMENT & PLAN NOTE
-admit for IV abx, bowel rest and GI consult as this could be infectious vs inflammatory vs ischemic especially with an elevated lactic acid  -IV fluids and repeat lactic acid

## 2017-04-24 NOTE — ASSESSMENT & PLAN NOTE
9>>3.8.   -Has improved with IVF's.  -bowel ischemia is a concern, but has improved.   -repeat again today

## 2017-04-24 NOTE — SUBJECTIVE & OBJECTIVE
Interval History: patient reports abdominal pain is present but has significantly improved. Admits to vomiting and diarrhea. Last episode yesterday. GI has been consulted. Admits to tremors and requesting medications for ETOH withdrawal. Is requesting help with drinking.     Review of Systems   Constitutional: Positive for appetite change and fatigue. Negative for chills, diaphoresis and fever.   HENT: Negative for drooling, ear pain, rhinorrhea and sore throat.    Eyes: Negative.    Respiratory: Negative for cough, shortness of breath and wheezing.    Cardiovascular: Negative for palpitations and leg swelling.   Gastrointestinal: Positive for abdominal pain, diarrhea and vomiting. Negative for constipation and nausea.   Endocrine: Negative.    Genitourinary: Negative for dysuria, hematuria and urgency.   Musculoskeletal: Negative.    Skin: Negative for color change and wound.   Allergic/Immunologic: Negative.    Neurological: Positive for weakness. Negative for dizziness, syncope and speech difficulty.   Hematological: Negative.    Psychiatric/Behavioral: Negative.      Objective:     Vital Signs (Most Recent):  Temp: 98.3 °F (36.8 °C) (04/24/17 0716)  Pulse: 62 (04/24/17 0716)  Resp: 18 (04/24/17 0716)  BP: 129/79 (04/24/17 0716)  SpO2: 98 % (04/24/17 0716) Vital Signs (24h Range):  Temp:  [97.8 °F (36.6 °C)-98.9 °F (37.2 °C)] 98.3 °F (36.8 °C)  Pulse:  [] 62  Resp:  [16-20] 18  SpO2:  [95 %-98 %] 98 %  BP: (105-131)/(62-89) 129/79     Weight: 77.1 kg (170 lb)  Body mass index is 25.1 kg/(m^2).    Intake/Output Summary (Last 24 hours) at 04/24/17 1148  Last data filed at 04/24/17 0500   Gross per 24 hour   Intake             4715 ml   Output                0 ml   Net             4715 ml      Physical Exam   Constitutional: He is oriented to person, place, and time. He appears well-developed and well-nourished. No distress.   Appears anxious   HENT:   Head: Normocephalic and atraumatic.   Eyes: EOM are  normal.   Neck: Normal range of motion. Neck supple.   Cardiovascular: Normal rate, regular rhythm and normal heart sounds.    Pulmonary/Chest: Effort normal and breath sounds normal. No respiratory distress.   Abdominal: Soft. Bowel sounds are normal. He exhibits no distension. There is no tenderness.   Musculoskeletal: Normal range of motion.   Neurological: He is alert and oriented to person, place, and time.   +tremors     Skin: Skin is dry.   Nursing note and vitals reviewed.    Significant Labs:   CBC:   Recent Labs  Lab 04/23/17  1520 04/24/17  0646   WBC 6.78 2.68*   HGB 16.4 13.4*   HCT 45.2 36.9*    90*     CMP:   Recent Labs  Lab 04/23/17  1520 04/24/17  0646    140   K 3.7 3.5   CL 97 102   CO2 16* 27   * 104   BUN 8 9   CREATININE 1.0 0.8   CALCIUM 9.4 8.3*   PROT 8.2 6.2   ALBUMIN 4.8 3.6   BILITOT 1.6* 2.2*   ALKPHOS 88 64   AST 56* 42*   ALT 47* 35   ANIONGAP 27* 11   EGFRNONAA >60.0 >60       Significant Imaging:  Imaging Results         CT Abdomen Pelvis With Contrast (Final result) Result time:  04/23/17 17:08:30    Final result by Alphonse Meza Jr., MD (04/23/17 17:08:30)    Impression:      1. There is colonic wall thickening of the ascending, transverse, and descending colon with mild surrounding inflammatory change, most extensive involving the ascending colon.  This is consistent with colitis including infectious amongst other etiologies. No evidence of appendicitis.    2.  There is hepatomegaly with fatty infiltration of the liver.    3.  The gallbladder is distended without surrounding inflammation or abnormal biliary ductal dilation.    4.  Small hiatal hernia.        Electronically signed by: ALPHONSE MEZA M.D.  Date:     04/23/17  Time:    17:08     Narrative:    CT ABDOMEN PELVIS WITH CONTRAST    Clinical history: Lower abdominal pain    TECHNIQUE: Routine abdominal and pelvic CT performed after the IV administration of 75 mL Omnipaque 350. Coronal and sagittal  images obtained. All CT scans at this facility use dose modulation, iterative reconstruction, and/or weight based dosing when appropriate to reduce radiation dose to as low as reasonably achievable.    Comparison: None    Findings: No acute disease is seen in the lung bases.  No acute osseous abnormality.  Scoliosis of the lumbar spine with convexity toward the right at the level of L2-L3.     There is a small hiatal hernia.  The stomach and small bowel are not unusual in appearance.  There is inflammatory change involving the ascending colon with colonic wall thickening.  The transverse colon and descending colon are also collapsed and demonstrate mild wall thickening. No evidence of appendicitis. No free intraperitoneal fluid, free air or abscess identified.    The liver is mildly enlarged and demonstrates fatty infiltration.  No focal liver lesion. The gallbladder is distended.  No inflammatory change about the gallbladder. The pancreas, spleen and adrenals are unremarkable. No aortic aneurysm is identified. The kidneys and ureters are unremarkable. The bladder is within normal limits. Reproductive organs are normal in appearance. No pathologically enlarged lymph nodes are identified.            X-Ray Chest AP Portable (Final result) Result time:  04/23/17 15:50:05    Final result by Alejandro Elizabeth MD (04/23/17 15:50:05)    Impression:          1.  Negative for acute process involving the chest.  2.  Stable findings as noted above.      Electronically signed by: ALEJANDRO ELIZABETH MD  Date:     04/23/17  Time:    15:50     Narrative:    Portable Chest x-ray    Clinical Indication: Weakness.  Fatigue.  Malaise.       Findings:     Comparisons are made to 10/25/2016.  EKG leads overlie the chest which is lordotic in position.     The lungs are clear. The cardiac silhouette size is normal. The trachea is midline and the mediastinal width is normal. Negative for focal infiltrate, effusion or pneumothorax. Pulmonary  vasculature is normal. Negative for osseous abnormalities.   Mild S-shaped curvature of the spine. There are calcifications of the aortic knob and tortuosity of the descending thoracic aorta.  Old right rib fracture again seen.
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
## 2017-04-24 NOTE — ASSESSMENT & PLAN NOTE
-given history of N/V/D will change antibiotics to IV Flagyl and Ciprofloxacin. Okay to advance to clear liquid diet  -GI consulted due to concern infectious  vs ischemic especially with an elevated lactic acid  -Lactic acid has improved with IVF's. Will repeat again.  -stool studies have been ordered.

## 2017-04-24 NOTE — ASSESSMENT & PLAN NOTE
60 yo male admitted with nausea, vomiting, abdominal pain and diarrhea. Noted to have right sided colitis on CT scan. The patient is feeling a little better today. Stool studies have been ordered and empiric antibiotics started. He will likely need an EGD and colonoscopy as an outpatient. Ok to start clear liquid diet. Agree with Protonix daily. Continue supportive care.

## 2017-04-24 NOTE — H&P
[facility] Hospital Medicine  History & Physical    Patient Name: Barb Carson  MRN: [id number]  Admission Date: 4/23/2017  Attending Physician: Raz Canales MD   Primary Care Provider: Tony Paul DO         Patient information was obtained from patient, past medical records and ER records.     Subjective:     Principal Problem:<principal problem not specified>    Chief Complaint:   Chief Complaint   Patient presents with    Fatigue     pt feeling weak, has blood in urine 3 days.also nausea and abd pain    Hematuria    Abdominal Pain        HPI: Mr. Carson is a 62 yo WM with no known chronic medical problems presented to another facility complaining of severe abdominal pain. He does have a history of daily EtOH consumption and had a drink earlier today. He denies any N,V or diarrhea. CT done revealed pan-colitis and he had a lactic acid of 9.     Past Medical History:   Diagnosis Date    Alcohol abuse     Anxiety        History reviewed. No pertinent surgical history.    Review of patient's allergies indicates:  No Known Allergies    No current facility-administered medications on file prior to encounter.      Current Outpatient Prescriptions on File Prior to Encounter   Medication Sig    INV folate 1 MG tablet Take 1 tablet (1 mg total) by mouth once daily.    sertraline (ZOLOFT) 50 MG tablet Take 1 tablet (50 mg total) by mouth once daily.    thiamine (VITAMIN B-1) 50 MG tablet Take 1 tablet (50 mg total) by mouth once daily.    lorazepam (ATIVAN) 1 MG tablet Take 1 tablet (1 mg total) by mouth every 6 (six) hours as needed for Anxiety.    multivitamin (THERAGRAN) per tablet Take 1 tablet by mouth once daily.     Family History     None        Social History Main Topics    Smoking status: Current Every Day Smoker     Packs/day: 2.00     Types: Cigarettes    Smokeless tobacco: Not on file    Alcohol use Yes      Comment: daily- fifth    Drug use: No    Sexual  activity: No     Review of Systems   Constitutional: Negative.    Gastrointestinal: Positive for abdominal pain.   All other systems reviewed and are negative.    Objective:     Vital Signs (Most Recent):  Temp: 98.8 °F (37.1 °C) (04/24/17 0013)  Pulse: 71 (04/24/17 0013)  Resp: 18 (04/24/17 0013)  BP: 125/73 (04/24/17 0013)  SpO2: 98 % (04/24/17 0013) Vital Signs (24h Range):  Temp:  [97.8 °F (36.6 °C)-98.9 °F (37.2 °C)] 98.8 °F (37.1 °C)  Pulse:  [] 71  Resp:  [17-20] 18  SpO2:  [95 %-98 %] 98 %  BP: (124-131)/(62-89) 125/73     Weight: 77.2 kg (170 lb 2 oz)  Body mass index is 25.12 kg/(m^2).    Physical Exam   Constitutional: He is oriented to person, place, and time. He appears well-developed and well-nourished.   HENT:   Head: Normocephalic and atraumatic.   Nose: Nose normal.   Mouth/Throat: Oropharynx is clear and moist.   Eyes: Conjunctivae and EOM are normal. Pupils are equal, round, and reactive to light.   Neck: Normal range of motion. Neck supple.   Cardiovascular: Normal rate, regular rhythm, normal heart sounds and intact distal pulses.    Pulmonary/Chest: Effort normal.   Abdominal: Soft. He exhibits no mass. There is tenderness. There is no rebound and no guarding.   Musculoskeletal: Normal range of motion.   Neurological: He is alert and oriented to person, place, and time. He has normal reflexes.   Skin: Skin is warm and dry.   Psychiatric: He has a normal mood and affect.   Nursing note and vitals reviewed.       Significant Labs:   CBC:   Recent Labs  Lab 04/23/17  1520   WBC 6.78   HGB 16.4   HCT 45.2        CMP:   Recent Labs  Lab 04/23/17  1520      K 3.7   CL 97   CO2 16*   *   BUN 8   CREATININE 1.0   CALCIUM 9.4   PROT 8.2   ALBUMIN 4.8   BILITOT 1.6*   ALKPHOS 88   AST 56*   ALT 47*   ANIONGAP 27*   EGFRNONAA >60.0     Cardiac Markers: No results for input(s): CKMB, MYOGLOBIN, BNP, TROPISTAT in the last 48 hours.  Coagulation:   Recent Labs  Lab 04/23/17  1520    INR 1.0   APTT 23.9     Lactic Acid:   Recent Labs  Lab 04/23/17  1520 04/23/17  1915   LACTATE 9.0* 3.8*       Significant Imaging: I have reviewed and interpreted all pertinent imaging results/findings within the past 24 hours.    Assessment/Plan:     Tobacco use  Counseled on smoking cessation      Alcohol abuse  -Counseled   -monitor for withdrawals and PRN IV Ativan      Colitis  -admit for IV abx, bowel rest and GI consult as this could be infectious vs inflammatory vs ischemic especially with an elevated lactic acid  -IV fluids and repeat lactic acid      VTE Risk Mitigation         Ordered     Medium Risk of VTE  Once      04/23/17 2156     Reason for No Pharmacological VTE Prophylaxis  Once      04/23/17 2156     Place SUNITA hose  Until discontinued      04/23/17 2156     Place sequential compression device  Until discontinued      04/23/17 2156        Fidencio Ulloa MD  Department of Hospital Medicine   [facility]

## 2017-04-24 NOTE — ED NOTES
Pt informed of transfer process and has verbalized understanding. Transfer sheet signed pt at this time. Pt is aaoX4, resp e/u, rates pain 5/10, nad. Bed locked in lowest position, side rails up X2, call bell in reach. Will continue to monitor.

## 2017-04-24 NOTE — ASSESSMENT & PLAN NOTE
No diagnosis of cirrhosis at this time. Discussed complications of alcohol abuse with the patient and his daughter and recommend alcohol cessation.   He will need prophylaxis to try and prevent withdrawal.

## 2017-04-24 NOTE — SUBJECTIVE & OBJECTIVE
Past Medical History:   Diagnosis Date    Alcohol abuse     Anxiety        History reviewed. No pertinent surgical history.    Review of patient's allergies indicates:  No Known Allergies  Family History     None        Social History Main Topics    Smoking status: Current Every Day Smoker     Packs/day: 2.00     Types: Cigarettes    Smokeless tobacco: Not on file    Alcohol use Yes      Comment: daily- fifth    Drug use: No    Sexual activity: No     Review of Systems   Constitutional: Negative for appetite change, fatigue and fever.   HENT: Negative for hearing loss and sore throat.    Eyes: Negative for visual disturbance.   Respiratory: Negative for cough, choking and shortness of breath.    Cardiovascular: Negative for chest pain and palpitations.   Gastrointestinal:        As per HPI.   Genitourinary: Positive for hematuria (dark urine). Negative for difficulty urinating, dysuria and frequency.   Musculoskeletal: Negative for arthralgias and back pain.   Skin: Negative for color change and rash.   Neurological: Negative for dizziness, seizures, syncope, weakness, numbness and headaches.   Hematological: Does not bruise/bleed easily.   Psychiatric/Behavioral: The patient is not nervous/anxious (reports h/o depression).      Objective:     Vital Signs (Most Recent):  Temp: 98.3 °F (36.8 °C) (04/24/17 0716)  Pulse: 62 (04/24/17 0716)  Resp: 18 (04/24/17 0716)  BP: 129/79 (04/24/17 0716)  SpO2: 98 % (04/24/17 0716) Vital Signs (24h Range):  Temp:  [97.8 °F (36.6 °C)-98.9 °F (37.2 °C)] 98.3 °F (36.8 °C)  Pulse:  [] 62  Resp:  [16-20] 18  SpO2:  [95 %-98 %] 98 %  BP: (105-131)/(62-89) 129/79     Weight: 77.1 kg (170 lb) (04/23/17 2019)  Body mass index is 25.1 kg/(m^2).      Intake/Output Summary (Last 24 hours) at 04/24/17 1143  Last data filed at 04/24/17 0500   Gross per 24 hour   Intake             4715 ml   Output                0 ml   Net             4715 ml       Lines/Drains/Airways      Peripheral Intravenous Line                 Peripheral IV - Single Lumen 04/23/17 1520 Right Antecubital less than 1 day                Physical Exam   Constitutional: He is oriented to person, place, and time. He appears well-developed and well-nourished.   HENT:   Head: Normocephalic and atraumatic.   Eyes: EOM are normal.   Neck: Normal range of motion. Neck supple.   Cardiovascular: Normal rate, regular rhythm and normal heart sounds.    No murmur heard.  Pulmonary/Chest: Effort normal and breath sounds normal. No respiratory distress. He has no wheezes.   Abdominal: Soft. Bowel sounds are normal. He exhibits no distension and no mass. There is no hepatomegaly. There is no tenderness.   Musculoskeletal: He exhibits no edema.   Neurological: He is alert and oriented to person, place, and time. He displays tremor (noted in upper extremities). No cranial nerve deficit. Gait normal.   Skin: Skin is warm and dry. No rash noted.   Psychiatric: He has a normal mood and affect.       Significant Labs:  CBC:   Recent Labs  Lab 04/23/17  1520 04/24/17  0646   WBC 6.78 2.68*   HGB 16.4 13.4*   HCT 45.2 36.9*    90*     CMP:   Recent Labs  Lab 04/24/17  0646      CALCIUM 8.3*   ALBUMIN 3.6   PROT 6.2      K 3.5   CO2 27      BUN 9   CREATININE 0.8   ALKPHOS 64   ALT 35   AST 42*   BILITOT 2.2*       Significant Imaging:  Imaging results within the past 24 hours have been reviewed.

## 2017-04-25 PROBLEM — E87.6 HYPOKALEMIA: Status: ACTIVE | Noted: 2017-04-25

## 2017-04-25 LAB
ALBUMIN SERPL BCP-MCNC: 3.5 G/DL
ALP SERPL-CCNC: 61 U/L
ALT SERPL W/O P-5'-P-CCNC: 27 U/L
ANION GAP SERPL CALC-SCNC: 10 MMOL/L
AST SERPL-CCNC: 39 U/L
BASOPHILS # BLD AUTO: 0.02 K/UL
BASOPHILS NFR BLD: 0.7 %
BILIRUB SERPL-MCNC: 2 MG/DL
BUN SERPL-MCNC: 5 MG/DL
C DIFF GDH STL QL: POSITIVE
C DIFF TOX A+B STL QL IA: NEGATIVE
CALCIUM SERPL-MCNC: 8.5 MG/DL
CHLORIDE SERPL-SCNC: 105 MMOL/L
CO2 SERPL-SCNC: 25 MMOL/L
CREAT SERPL-MCNC: 0.8 MG/DL
DIFFERENTIAL METHOD: ABNORMAL
EOSINOPHIL # BLD AUTO: 0 K/UL
EOSINOPHIL NFR BLD: 1 %
ERYTHROCYTE [DISTWIDTH] IN BLOOD BY AUTOMATED COUNT: 14.6 %
EST. GFR  (AFRICAN AMERICAN): >60 ML/MIN/1.73 M^2
EST. GFR  (NON AFRICAN AMERICAN): >60 ML/MIN/1.73 M^2
GLUCOSE SERPL-MCNC: 91 MG/DL
HCT VFR BLD AUTO: 36.1 %
HGB BLD-MCNC: 12.8 G/DL
LYMPHOCYTES # BLD AUTO: 1.5 K/UL
LYMPHOCYTES NFR BLD: 50.2 %
MAGNESIUM SERPL-MCNC: 1.5 MG/DL
MCH RBC QN AUTO: 35.9 PG
MCHC RBC AUTO-ENTMCNC: 35.5 %
MCV RBC AUTO: 101 FL
MONOCYTES # BLD AUTO: 0.1 K/UL
MONOCYTES NFR BLD: 3.8 %
NEUTROPHILS # BLD AUTO: 1.3 K/UL
NEUTROPHILS NFR BLD: 44.3 %
PLATELET # BLD AUTO: 70 K/UL
PMV BLD AUTO: 9.3 FL
POTASSIUM SERPL-SCNC: 3.2 MMOL/L
PROT SERPL-MCNC: 6.1 G/DL
RBC # BLD AUTO: 3.57 M/UL
SODIUM SERPL-SCNC: 140 MMOL/L
WBC # BLD AUTO: 2.93 K/UL

## 2017-04-25 PROCEDURE — 25000003 PHARM REV CODE 250: Performed by: INTERNAL MEDICINE

## 2017-04-25 PROCEDURE — 63600175 PHARM REV CODE 636 W HCPCS: Performed by: NURSE PRACTITIONER

## 2017-04-25 PROCEDURE — 36415 COLL VENOUS BLD VENIPUNCTURE: CPT

## 2017-04-25 PROCEDURE — 85025 COMPLETE CBC W/AUTO DIFF WBC: CPT

## 2017-04-25 PROCEDURE — C9113 INJ PANTOPRAZOLE SODIUM, VIA: HCPCS | Performed by: EMERGENCY MEDICINE

## 2017-04-25 PROCEDURE — 25000003 PHARM REV CODE 250: Performed by: NURSE PRACTITIONER

## 2017-04-25 PROCEDURE — 21400001 HC TELEMETRY ROOM

## 2017-04-25 PROCEDURE — 25000003 PHARM REV CODE 250: Performed by: HOSPITALIST

## 2017-04-25 PROCEDURE — 63600175 PHARM REV CODE 636 W HCPCS: Performed by: HOSPITALIST

## 2017-04-25 PROCEDURE — 63600175 PHARM REV CODE 636 W HCPCS: Performed by: INTERNAL MEDICINE

## 2017-04-25 PROCEDURE — 63600175 PHARM REV CODE 636 W HCPCS: Performed by: EMERGENCY MEDICINE

## 2017-04-25 PROCEDURE — 83735 ASSAY OF MAGNESIUM: CPT

## 2017-04-25 PROCEDURE — 80053 COMPREHEN METABOLIC PANEL: CPT

## 2017-04-25 RX ORDER — POTASSIUM CHLORIDE 20 MEQ/1
40 TABLET, EXTENDED RELEASE ORAL ONCE
Status: COMPLETED | OUTPATIENT
Start: 2017-04-25 | End: 2017-04-25

## 2017-04-25 RX ORDER — LORAZEPAM 1 MG/1
1 TABLET ORAL EVERY 8 HOURS PRN
Status: DISCONTINUED | OUTPATIENT
Start: 2017-04-25 | End: 2017-04-26 | Stop reason: HOSPADM

## 2017-04-25 RX ORDER — LANOLIN ALCOHOL/MO/W.PET/CERES
400 CREAM (GRAM) TOPICAL 2 TIMES DAILY
Status: DISCONTINUED | OUTPATIENT
Start: 2017-04-25 | End: 2017-04-26 | Stop reason: HOSPADM

## 2017-04-25 RX ADMIN — LORAZEPAM 1 MG: 2 INJECTION INTRAMUSCULAR; INTRAVENOUS at 06:04

## 2017-04-25 RX ADMIN — CHLORDIAZEPOXIDE HYDROCHLORIDE 25 MG: 25 CAPSULE ORAL at 02:04

## 2017-04-25 RX ADMIN — METRONIDAZOLE 500 MG: 500 INJECTION, SOLUTION INTRAVENOUS at 08:04

## 2017-04-25 RX ADMIN — METRONIDAZOLE 500 MG: 500 INJECTION, SOLUTION INTRAVENOUS at 05:04

## 2017-04-25 RX ADMIN — CHLORDIAZEPOXIDE HYDROCHLORIDE 25 MG: 25 CAPSULE ORAL at 05:04

## 2017-04-25 RX ADMIN — MAGNESIUM OXIDE TAB 400 MG (241.3 MG ELEMENTAL MG) 400 MG: 400 (241.3 MG) TAB at 08:04

## 2017-04-25 RX ADMIN — CIPROFLOXACIN 400 MG: 2 INJECTION, SOLUTION INTRAVENOUS at 02:04

## 2017-04-25 RX ADMIN — LORAZEPAM 1 MG: 1 TABLET ORAL at 08:04

## 2017-04-25 RX ADMIN — CHLORDIAZEPOXIDE HYDROCHLORIDE 25 MG: 25 CAPSULE ORAL at 10:04

## 2017-04-25 RX ADMIN — POTASSIUM CHLORIDE 40 MEQ: 1500 TABLET, EXTENDED RELEASE ORAL at 09:04

## 2017-04-25 RX ADMIN — LORAZEPAM 1 MG: 2 INJECTION INTRAMUSCULAR; INTRAVENOUS at 09:04

## 2017-04-25 RX ADMIN — FOLIC ACID: 5 INJECTION, SOLUTION INTRAMUSCULAR; INTRAVENOUS; SUBCUTANEOUS at 09:04

## 2017-04-25 RX ADMIN — CIPROFLOXACIN 400 MG: 2 INJECTION, SOLUTION INTRAVENOUS at 01:04

## 2017-04-25 RX ADMIN — PANTOPRAZOLE SODIUM 40 MG: 40 INJECTION, POWDER, FOR SOLUTION INTRAVENOUS at 09:04

## 2017-04-25 RX ADMIN — METRONIDAZOLE 500 MG: 500 INJECTION, SOLUTION INTRAVENOUS at 02:04

## 2017-04-25 NOTE — PROGRESS NOTES
Contact isolation maintained. Positive for C. Diff antigen, negative for toxins. Denies pain. No s/s of alcohol withdrawal, ativan given prn. Denies nausea/vomiting.

## 2017-04-25 NOTE — PLAN OF CARE
Problem: Patient Care Overview  Goal: Plan of Care Review  Outcome: Ongoing (interventions implemented as appropriate)  Contact isolation maintained until cleared, awaiting results for c diff. Pt encouraged to reposition self. Denies pain. On clear liq, tolerating, denies nausea/vomiting. No s/s alcohol withdrawal, will cont to monitor, ativan given for anxiety. Banana bag, IVF, and abx given as prescribed. POC and meds reviewed w/ pt, pt verbalizes understanding. Chart check done. Will cont to monitor.

## 2017-04-25 NOTE — ASSESSMENT & PLAN NOTE
-Given history of N/V/D will change antibiotics to IV Flagyl and Ciprofloxacin.   -C.diff Antigen positive. Awaiting PCR prior to discharge.

## 2017-04-25 NOTE — SUBJECTIVE & OBJECTIVE
Interval History: Awaiting C. Diff PCR. Tolerating diet.     Review of Systems   Constitutional: Negative for appetite change, chills, diaphoresis, fatigue and fever.   HENT: Negative for drooling, ear pain, rhinorrhea and sore throat.    Eyes: Negative.    Respiratory: Negative for cough, shortness of breath and wheezing.    Cardiovascular: Negative for palpitations and leg swelling.   Gastrointestinal: Positive for diarrhea and vomiting. Negative for abdominal pain, constipation and nausea.   Endocrine: Negative.    Genitourinary: Negative for dysuria, hematuria and urgency.   Musculoskeletal: Negative.    Skin: Negative for color change and wound.   Allergic/Immunologic: Negative.    Neurological: Negative for dizziness, syncope, speech difficulty and weakness.   Hematological: Negative.    Psychiatric/Behavioral: Negative.      Objective:     Vital Signs (Most Recent):  Temp: 98.5 °F (36.9 °C) (04/25/17 1600)  Pulse: 70 (04/25/17 1600)  Resp: 18 (04/25/17 1600)  BP: 108/64 (04/25/17 1600)  SpO2: 98 % (04/25/17 1600) Vital Signs (24h Range):  Temp:  [97.6 °F (36.4 °C)-98.6 °F (37 °C)] 98.5 °F (36.9 °C)  Pulse:  [56-73] 70  Resp:  [18] 18  SpO2:  [95 %-98 %] 98 %  BP: (108-124)/(64-82) 108/64     Weight: 77.1 kg (170 lb)  Body mass index is 25.1 kg/(m^2).    Intake/Output Summary (Last 24 hours) at 04/25/17 1723  Last data filed at 04/25/17 1023   Gross per 24 hour   Intake             6715 ml   Output                0 ml   Net             6715 ml      Physical Exam   Constitutional: He is oriented to person, place, and time. He appears well-developed and well-nourished. No distress.   HENT:   Head: Normocephalic and atraumatic.   Eyes: EOM are normal.   Neck: Normal range of motion. Neck supple.   Cardiovascular: Normal rate, regular rhythm and normal heart sounds.    Pulmonary/Chest: Effort normal and breath sounds normal. No respiratory distress.   Abdominal: Soft. Bowel sounds are normal. He exhibits no  distension. There is no tenderness.   Musculoskeletal: Normal range of motion.   Neurological: He is alert and oriented to person, place, and time.   Skin: Skin is dry.   Nursing note and vitals reviewed.    Significant Labs:   CBC:   Recent Labs  Lab 04/24/17 0646 04/25/17 0615   WBC 2.68* 2.93*   HGB 13.4* 12.8*   HCT 36.9* 36.1*   PLT 90* 70*     CMP:   Recent Labs  Lab 04/24/17 0646 04/25/17 0615    140   K 3.5 3.2*    105   CO2 27 25    91   BUN 9 5*   CREATININE 0.8 0.8   CALCIUM 8.3* 8.5*   PROT 6.2 6.1   ALBUMIN 3.6 3.5   BILITOT 2.2* 2.0*   ALKPHOS 64 61   AST 42* 39   ALT 35 27   ANIONGAP 11 10   EGFRNONAA >60 >60       Significant Imaging:  Imaging Results         CT Abdomen Pelvis With Contrast (Final result) Result time:  04/23/17 17:08:30    Final result by Alphonse Meza Jr., MD (04/23/17 17:08:30)    Impression:      1. There is colonic wall thickening of the ascending, transverse, and descending colon with mild surrounding inflammatory change, most extensive involving the ascending colon.  This is consistent with colitis including infectious amongst other etiologies. No evidence of appendicitis.    2.  There is hepatomegaly with fatty infiltration of the liver.    3.  The gallbladder is distended without surrounding inflammation or abnormal biliary ductal dilation.    4.  Small hiatal hernia.        Electronically signed by: ALPHONSE MEZA M.D.  Date:     04/23/17  Time:    17:08     Narrative:    CT ABDOMEN PELVIS WITH CONTRAST    Clinical history: Lower abdominal pain    TECHNIQUE: Routine abdominal and pelvic CT performed after the IV administration of 75 mL Omnipaque 350. Coronal and sagittal images obtained. All CT scans at this facility use dose modulation, iterative reconstruction, and/or weight based dosing when appropriate to reduce radiation dose to as low as reasonably achievable.    Comparison: None    Findings: No acute disease is seen in the lung bases.  No acute  osseous abnormality.  Scoliosis of the lumbar spine with convexity toward the right at the level of L2-L3.     There is a small hiatal hernia.  The stomach and small bowel are not unusual in appearance.  There is inflammatory change involving the ascending colon with colonic wall thickening.  The transverse colon and descending colon are also collapsed and demonstrate mild wall thickening. No evidence of appendicitis. No free intraperitoneal fluid, free air or abscess identified.    The liver is mildly enlarged and demonstrates fatty infiltration.  No focal liver lesion. The gallbladder is distended.  No inflammatory change about the gallbladder. The pancreas, spleen and adrenals are unremarkable. No aortic aneurysm is identified. The kidneys and ureters are unremarkable. The bladder is within normal limits. Reproductive organs are normal in appearance. No pathologically enlarged lymph nodes are identified.            X-Ray Chest AP Portable (Final result) Result time:  04/23/17 15:50:05    Final result by Alejandro Elizabeth MD (04/23/17 15:50:05)    Impression:          1.  Negative for acute process involving the chest.  2.  Stable findings as noted above.      Electronically signed by: ALEJANDRO ELIZABETH MD  Date:     04/23/17  Time:    15:50     Narrative:    Portable Chest x-ray    Clinical Indication: Weakness.  Fatigue.  Malaise.       Findings:     Comparisons are made to 10/25/2016.  EKG leads overlie the chest which is lordotic in position.     The lungs are clear. The cardiac silhouette size is normal. The trachea is midline and the mediastinal width is normal. Negative for focal infiltrate, effusion or pneumothorax. Pulmonary vasculature is normal. Negative for osseous abnormalities.   Mild S-shaped curvature of the spine. There are calcifications of the aortic knob and tortuosity of the descending thoracic aorta.  Old right rib fracture again seen.

## 2017-04-25 NOTE — ASSESSMENT & PLAN NOTE
-Counseled;. Expressed concern to quit. Drinks 1/5 daily.  will be consulted for resources.  -daily banana bag to replete nutrients.   Continue Libirum as patient diet has been advanced  -Continue to monitor for withdrawal  Plans to go to [place] 4/28

## 2017-04-25 NOTE — PROGRESS NOTES
[facility] Hospital Medicine  Progress Note    Patient Name: Barb Carson  MRN: [id number]  Patient Class: IP- Inpatient   Admission Date: 4/23/2017  Length of Stay: 2 days  Attending Physician: Raz Canales MD  Primary Care Provider: Tony Paul DO    Subjective:     Principal Problem:Colitis    HPI:  Mr. Carson is a 60 yo WM with no known chronic medical problems presented to another facility complaining of severe abdominal pain. He does have a history of daily EtOH consumption and had a drink earlier today. He denies any N,V or diarrhea. CT done revealed pan-colitis and he had a lactic acid of 9.     Hospital Course:  Patient was admitted with Colitis and severe Acidosis. There were concerns of bowel ischemia. With further review of CT Abdomen and discussion with GI, it ascending colon shows evidence of colitis as wall thickening in transverse and descending could have been from collapsed area. Patient was aggressively hydrated and lactic acidosis improved. Patient admitted to episodes of nausea and diarrhea. He was placed on IV antibiotics and IV hydration. C. Diff antigen positive, toxin negative. PCR pending.         Patient admits to chronic history of ETOH abuse (drinks 1/5 daily). He was treated with banana bag and benzos for withdrawal symptoms. Pancytopenia was felt to be related to bone marrow suppression from ETOH use. Patient verbalized interest in rehab and  has been consulted for this.       Interval History: Awaiting C. Diff PCR. Tolerating diet.     Review of Systems   Constitutional: Negative for appetite change, chills, diaphoresis, fatigue and fever.   HENT: Negative for drooling, ear pain, rhinorrhea and sore throat.    Eyes: Negative.    Respiratory: Negative for cough, shortness of breath and wheezing.    Cardiovascular: Negative for palpitations and leg swelling.   Gastrointestinal: Positive for diarrhea and vomiting. Negative for abdominal  pain, constipation and nausea.   Endocrine: Negative.    Genitourinary: Negative for dysuria, hematuria and urgency.   Musculoskeletal: Negative.    Skin: Negative for color change and wound.   Allergic/Immunologic: Negative.    Neurological: Negative for dizziness, syncope, speech difficulty and weakness.   Hematological: Negative.    Psychiatric/Behavioral: Negative.      Objective:     Vital Signs (Most Recent):  Temp: 98.5 °F (36.9 °C) (04/25/17 1600)  Pulse: 70 (04/25/17 1600)  Resp: 18 (04/25/17 1600)  BP: 108/64 (04/25/17 1600)  SpO2: 98 % (04/25/17 1600) Vital Signs (24h Range):  Temp:  [97.6 °F (36.4 °C)-98.6 °F (37 °C)] 98.5 °F (36.9 °C)  Pulse:  [56-73] 70  Resp:  [18] 18  SpO2:  [95 %-98 %] 98 %  BP: (108-124)/(64-82) 108/64     Weight: 77.1 kg (170 lb)  Body mass index is 25.1 kg/(m^2).    Intake/Output Summary (Last 24 hours) at 04/25/17 1723  Last data filed at 04/25/17 1023   Gross per 24 hour   Intake             6715 ml   Output                0 ml   Net             6715 ml      Physical Exam   Constitutional: He is oriented to person, place, and time. He appears well-developed and well-nourished. No distress.   HENT:   Head: Normocephalic and atraumatic.   Eyes: EOM are normal.   Neck: Normal range of motion. Neck supple.   Cardiovascular: Normal rate, regular rhythm and normal heart sounds.    Pulmonary/Chest: Effort normal and breath sounds normal. No respiratory distress.   Abdominal: Soft. Bowel sounds are normal. He exhibits no distension. There is no tenderness.   Musculoskeletal: Normal range of motion.   Neurological: He is alert and oriented to person, place, and time.   Skin: Skin is dry.   Nursing note and vitals reviewed.    Significant Labs:   CBC:   Recent Labs  Lab 04/24/17  0646 04/25/17  0615   WBC 2.68* 2.93*   HGB 13.4* 12.8*   HCT 36.9* 36.1*   PLT 90* 70*     CMP:   Recent Labs  Lab 04/24/17  0646 04/25/17  0615    140   K 3.5 3.2*    105   CO2 27 25    91    BUN 9 5*   CREATININE 0.8 0.8   CALCIUM 8.3* 8.5*   PROT 6.2 6.1   ALBUMIN 3.6 3.5   BILITOT 2.2* 2.0*   ALKPHOS 64 61   AST 42* 39   ALT 35 27   ANIONGAP 11 10   EGFRNONAA >60 >60       Significant Imaging:  Imaging Results         CT Abdomen Pelvis With Contrast (Final result) Result time:  04/23/17 17:08:30    Final result by Alphonse Meza Jr., MD (04/23/17 17:08:30)    Impression:      1. There is colonic wall thickening of the ascending, transverse, and descending colon with mild surrounding inflammatory change, most extensive involving the ascending colon.  This is consistent with colitis including infectious amongst other etiologies. No evidence of appendicitis.    2.  There is hepatomegaly with fatty infiltration of the liver.    3.  The gallbladder is distended without surrounding inflammation or abnormal biliary ductal dilation.    4.  Small hiatal hernia.        Electronically signed by: ALPHONSE MEZA M.D.  Date:     04/23/17  Time:    17:08     Narrative:    CT ABDOMEN PELVIS WITH CONTRAST    Clinical history: Lower abdominal pain    TECHNIQUE: Routine abdominal and pelvic CT performed after the IV administration of 75 mL Omnipaque 350. Coronal and sagittal images obtained. All CT scans at this facility use dose modulation, iterative reconstruction, and/or weight based dosing when appropriate to reduce radiation dose to as low as reasonably achievable.    Comparison: None    Findings: No acute disease is seen in the lung bases.  No acute osseous abnormality.  Scoliosis of the lumbar spine with convexity toward the right at the level of L2-L3.     There is a small hiatal hernia.  The stomach and small bowel are not unusual in appearance.  There is inflammatory change involving the ascending colon with colonic wall thickening.  The transverse colon and descending colon are also collapsed and demonstrate mild wall thickening. No evidence of appendicitis. No free intraperitoneal fluid, free air or  abscess identified.    The liver is mildly enlarged and demonstrates fatty infiltration.  No focal liver lesion. The gallbladder is distended.  No inflammatory change about the gallbladder. The pancreas, spleen and adrenals are unremarkable. No aortic aneurysm is identified. The kidneys and ureters are unremarkable. The bladder is within normal limits. Reproductive organs are normal in appearance. No pathologically enlarged lymph nodes are identified.            X-Ray Chest AP Portable (Final result) Result time:  04/23/17 15:50:05    Final result by Alejandro Elizabeth MD (04/23/17 15:50:05)    Impression:          1.  Negative for acute process involving the chest.  2.  Stable findings as noted above.      Electronically signed by: ALEJANDRO ELIZABETH MD  Date:     04/23/17  Time:    15:50     Narrative:    Portable Chest x-ray    Clinical Indication: Weakness.  Fatigue.  Malaise.       Findings:     Comparisons are made to 10/25/2016.  EKG leads overlie the chest which is lordotic in position.     The lungs are clear. The cardiac silhouette size is normal. The trachea is midline and the mediastinal width is normal. Negative for focal infiltrate, effusion or pneumothorax. Pulmonary vasculature is normal. Negative for osseous abnormalities.   Mild S-shaped curvature of the spine. There are calcifications of the aortic knob and tortuosity of the descending thoracic aorta.  Old right rib fracture again seen.                Assessment/Plan:      * Colitis  -Given history of N/V/D will change antibiotics to IV Flagyl and Ciprofloxacin.   -C.diff Antigen positive. Awaiting PCR prior to discharge.     Alcohol abuse  -Counseled;. Expressed concern to quit. Drinks 1/5 daily.  will be consulted for resources.  -daily banana bag to replete nutrients.   Continue Libirum as patient diet has been advanced  -Continue to monitor for withdrawal  Plans to go to [place] 4/28    Lactic acidosis  -resolved      Tobacco  use  -Counseled on smoking cessation      Pancytopenia  -could be due to bone marrow suppression from chronic ETOH. Could also be a component of dilution. Closely monitor      Total bilirubin, elevated  -due to above. Closely Monitor      Hypokalemia  replete      VTE Risk Mitigation         Ordered     Medium Risk of VTE  Once      04/23/17 2156     Reason for No Pharmacological VTE Prophylaxis  Once      04/23/17 2156     Place SUNITA hose  Until discontinued      04/23/17 2156     Place sequential compression device  Until discontinued      04/23/17 2156          Sandy Burger NP  Department of Hospital Medicine   [facility]

## 2017-04-25 NOTE — ASSESSMENT & PLAN NOTE
-could be due to bone marrow suppression from chronic ETOH. Could also be a component of dilution. Closely monitor

## 2017-04-26 VITALS
HEIGHT: 69 IN | DIASTOLIC BLOOD PRESSURE: 78 MMHG | BODY MASS INDEX: 25.18 KG/M2 | WEIGHT: 170 LBS | SYSTOLIC BLOOD PRESSURE: 113 MMHG | OXYGEN SATURATION: 99 % | TEMPERATURE: 98 F | HEART RATE: 61 BPM | RESPIRATION RATE: 18 BRPM

## 2017-04-26 LAB
ALBUMIN SERPL BCP-MCNC: 3.2 G/DL
ALP SERPL-CCNC: 57 U/L
ALT SERPL W/O P-5'-P-CCNC: 47 U/L
ANION GAP SERPL CALC-SCNC: 9 MMOL/L
AST SERPL-CCNC: 87 U/L
BASOPHILS # BLD AUTO: 0.01 K/UL
BASOPHILS NFR BLD: 0.4 %
BILIRUB SERPL-MCNC: 1.3 MG/DL
BUN SERPL-MCNC: 4 MG/DL
C DIFF TOX GENS STL QL NAA+PROBE: NEGATIVE
CALCIUM SERPL-MCNC: 8.3 MG/DL
CHLORIDE SERPL-SCNC: 107 MMOL/L
CO2 SERPL-SCNC: 24 MMOL/L
CREAT SERPL-MCNC: 0.8 MG/DL
DIFFERENTIAL METHOD: ABNORMAL
EOSINOPHIL # BLD AUTO: 0.1 K/UL
EOSINOPHIL NFR BLD: 2.2 %
ERYTHROCYTE [DISTWIDTH] IN BLOOD BY AUTOMATED COUNT: 14.9 %
EST. GFR  (AFRICAN AMERICAN): >60 ML/MIN/1.73 M^2
EST. GFR  (NON AFRICAN AMERICAN): >60 ML/MIN/1.73 M^2
GLUCOSE SERPL-MCNC: 96 MG/DL
HCT VFR BLD AUTO: 34.7 %
HGB BLD-MCNC: 12.3 G/DL
LYMPHOCYTES # BLD AUTO: 1 K/UL
LYMPHOCYTES NFR BLD: 44.1 %
MCH RBC QN AUTO: 35.7 PG
MCHC RBC AUTO-ENTMCNC: 35.4 %
MCV RBC AUTO: 101 FL
MONOCYTES # BLD AUTO: 0.1 K/UL
MONOCYTES NFR BLD: 5.2 %
NEUTROPHILS # BLD AUTO: 1.1 K/UL
NEUTROPHILS NFR BLD: 48.1 %
PLATELET # BLD AUTO: 61 K/UL
PMV BLD AUTO: 9.8 FL
POTASSIUM SERPL-SCNC: 3.3 MMOL/L
PROT SERPL-MCNC: 5.5 G/DL
RBC # BLD AUTO: 3.45 M/UL
SODIUM SERPL-SCNC: 140 MMOL/L
WBC # BLD AUTO: 2.29 K/UL

## 2017-04-26 PROCEDURE — 36415 COLL VENOUS BLD VENIPUNCTURE: CPT

## 2017-04-26 PROCEDURE — 80053 COMPREHEN METABOLIC PANEL: CPT

## 2017-04-26 PROCEDURE — 25000003 PHARM REV CODE 250: Performed by: NURSE PRACTITIONER

## 2017-04-26 PROCEDURE — 25000003 PHARM REV CODE 250: Performed by: HOSPITALIST

## 2017-04-26 PROCEDURE — 63600175 PHARM REV CODE 636 W HCPCS: Performed by: NURSE PRACTITIONER

## 2017-04-26 PROCEDURE — 85025 COMPLETE CBC W/AUTO DIFF WBC: CPT

## 2017-04-26 PROCEDURE — 63600175 PHARM REV CODE 636 W HCPCS: Performed by: EMERGENCY MEDICINE

## 2017-04-26 PROCEDURE — C9113 INJ PANTOPRAZOLE SODIUM, VIA: HCPCS | Performed by: EMERGENCY MEDICINE

## 2017-04-26 RX ORDER — THIAMINE HCL 50 MG
50 TABLET ORAL DAILY
Qty: 30 TABLET | Refills: 1 | Status: SHIPPED | OUTPATIENT
Start: 2017-04-26 | End: 2017-08-15

## 2017-04-26 RX ORDER — CIPROFLOXACIN 500 MG/1
500 TABLET ORAL EVERY 12 HOURS
Qty: 10 TABLET | Refills: 0 | Status: SHIPPED | OUTPATIENT
Start: 2017-04-26 | End: 2017-05-01

## 2017-04-26 RX ORDER — METRONIDAZOLE 500 MG/1
500 TABLET ORAL EVERY 8 HOURS
Qty: 15 TABLET | Refills: 0 | Status: SHIPPED | OUTPATIENT
Start: 2017-04-26 | End: 2017-05-01

## 2017-04-26 RX ORDER — LORAZEPAM 1 MG/1
1 TABLET ORAL EVERY 8 HOURS PRN
Qty: 12 TABLET | Refills: 0 | Status: SHIPPED | OUTPATIENT
Start: 2017-04-26 | End: 2017-08-10

## 2017-04-26 RX ADMIN — LORAZEPAM 1 MG: 1 TABLET ORAL at 05:04

## 2017-04-26 RX ADMIN — PANTOPRAZOLE SODIUM 40 MG: 40 INJECTION, POWDER, FOR SOLUTION INTRAVENOUS at 09:04

## 2017-04-26 RX ADMIN — CIPROFLOXACIN 400 MG: 2 INJECTION, SOLUTION INTRAVENOUS at 02:04

## 2017-04-26 RX ADMIN — CHLORDIAZEPOXIDE HYDROCHLORIDE 25 MG: 25 CAPSULE ORAL at 05:04

## 2017-04-26 RX ADMIN — METRONIDAZOLE 500 MG: 500 INJECTION, SOLUTION INTRAVENOUS at 05:04

## 2017-04-26 RX ADMIN — MAGNESIUM OXIDE TAB 400 MG (241.3 MG ELEMENTAL MG) 400 MG: 400 (241.3 MG) TAB at 09:04

## 2017-04-26 NOTE — DISCHARGE SUMMARY
[facility] Hospital Medicine  Discharge Summary      Patient Name: Barb Carson  MRN: [id number]  Admission Date: 4/23/2017  Hospital Length of Stay: 3 days  Discharge Date and Time:  04/26/2017 11:31 AM  Attending Physician: Dr. Canales  Discharging Provider: Sandy Burger NP  Primary Care Provider: Tony Paul DO    HPI:   Mr. Carson is a 60 yo WM with no known chronic medical problems presented to another facility complaining of severe abdominal pain. He does have a history of daily EtOH consumption and had a drink earlier today. He denies any N,V or diarrhea. CT done revealed pan-colitis and he had a lactic acid of 9.     * No surgery found *      Indwelling Lines/Drains at time of discharge:   Lines/Drains/Airways          No matching active lines, drains, or airways        Hospital Course:   Patient was admitted with Colitis and severe Acidosis. There were concerns of bowel ischemia. With further review of CT Abdomen and discussion with GI, it ascending colon shows evidence of colitis as wall thickening in transverse and descending could have been from collapsed area. Patient was aggressively hydrated and lactic acidosis improved. Patient admitted to episodes of nausea and diarrhea. He was placed on IV antibiotics and IV hydration. C. Diff antigen positive, toxin negative. PCR was negative. He will continue Ciprofloxacin and Flagyl x 5 days.     Patient admits to chronic history of ETOH abuse (drinks 1/5 daily). He was treated with banana bag and benzos for withdrawal symptoms. Pancytopenia was felt to be related to bone marrow suppression from ETOH use. Patient verbalized interest in rehab and  has been consulted for this and patient plans to [facility] Friday 4/28/17. Patient seen and examined on date of discharge and deemed suitable.        Consults:   Consults         Status Ordering Provider     Inpatient consult to Gastroenterology   Once     Provider:  (Not yet assigned)    Completed ASHOK JAY     Inpatient consult to Social Work  Once     Provider:  (Not yet assigned)    Completed GABRIEL ESPINAL JR     Inpatient consult to Social Work  Once     Provider:  (Not yet assigned)    Completed FLAVIO PIERRE          Significant Diagnostic Studies: Labs:   CMP   Recent Labs  Lab 04/25/17  0615 04/26/17  0818    140   K 3.2* 3.3*    107   CO2 25 24   GLU 91 96   BUN 5* 4*   CREATININE 0.8 0.8   CALCIUM 8.5* 8.3*   PROT 6.1 5.5*   ALBUMIN 3.5 3.2*   BILITOT 2.0* 1.3*   ALKPHOS 61 57   AST 39 87*   ALT 27 47*   ANIONGAP 10 9   ESTGFRAFRICA >60 >60   EGFRNONAA >60 >60    and CBC   Recent Labs  Lab 04/25/17  0615 04/26/17  0818   WBC 2.93* 2.29*   HGB 12.8* 12.3*   HCT 36.1* 34.7*   PLT 70* 61*       Pending Diagnostic Studies:     None        Final Active Diagnoses:    Diagnosis Date Noted POA    PRINCIPAL PROBLEM:  Colitis [K52.9] 04/23/2017 Yes    Alcohol abuse [F10.10] 03/16/2017 Yes    Lactic acidosis [E87.2] 04/24/2017 Yes    Hypokalemia [E87.6] 04/25/2017 No    Pancytopenia [D61.818] 04/24/2017 Yes    Total bilirubin, elevated [R17] 04/24/2017 Yes    Tobacco use [Z72.0] 12/04/2015 Yes      Problems Resolved During this Admission:    Diagnosis Date Noted Date Resolved POA      No new Assessment & Plan notes have been filed under this hospital service since the last note was generated.  Service: Hospital Medicine      Discharged Condition: stable    Disposition: Home or Self Care    Follow Up:  Follow-up Information     Follow up with Tony Paul DO In 1 week.    Specialty:  Family Medicine    Contact information:    [address] [address] [address] [phone]          Patient Instructions:     Diet general     Activity as tolerated       Medications:  Reconciled Home Medications:   Discharge Medication List as of 4/26/2017  9:53 AM      START taking these medications    Details   ciprofloxacin HCl (CIPRO)  500 MG tablet Take 1 tablet (500 mg total) by mouth every 12 (twelve) hours., Starting 4/26/2017, Until Mon 5/1/17, Normal      metronidazole (FLAGYL) 500 MG tablet Take 1 tablet (500 mg total) by mouth every 8 (eight) hours., Starting 4/26/2017, Until Mon 5/1/17, Normal         CONTINUE these medications which have CHANGED    Details   lorazepam (ATIVAN) 1 MG tablet Take 1 tablet (1 mg total) by mouth every 8 (eight) hours as needed for Anxiety., Starting 4/26/2017, Until Fri 5/26/17, Print      thiamine (VITAMIN B-1) 50 MG tablet Take 1 tablet (50 mg total) by mouth once daily., Starting 4/26/2017, Until Discontinued, Normal         CONTINUE these medications which have NOT CHANGED    Details   INV folate 1 MG tablet Take 1 tablet (1 mg total) by mouth once daily., Starting 2/15/2017, Until Discontinued, Outpatient [facility]      sertraline (ZOLOFT) 50 MG tablet Take 1 tablet (50 mg total) by mouth once daily., Starting 2/15/2017, Until Thu 2/15/18, Normal      multivitamin (THERAGRAN) per tablet Take 1 tablet by mouth once daily., Starting 2/15/2017, Until Discontinued, OTC           Time spent on the discharge of patient: >35 minutes    Sandy Burger NP  Department of Hospital Medicine  [facility]

## 2017-04-26 NOTE — PROGRESS NOTES
D/c instructions explained and copy given to patient.  All questions answered. Pt wheeled downstairs to personal vehicle; all belongings sent with patient. Pt is stable.

## 2017-04-26 NOTE — PLAN OF CARE
"Problem: Patient Care Overview  Goal: Plan of Care Review  Outcome: Ongoing (interventions implemented as appropriate)  Patient and spouse shared their concern with me about the patient's home med (Ativan) at the start of the shift. The pt stated, " Ativan is a home med that he ran out of. He was supposed to get a new prescription this week from his primary doctor, but was hospitalized before the appointment." I contacted Hospital [facility] and the Ativan was ordered q8hr prn, po. Patient stated that "Ativan helps him function at work and helps him settle down for sleep at night." Pt stated, " He isn't sure about the dosage but he takes one pill in the morning and one at night". Pt is concerned that if he is sent home without prescription of ativan that he may relapse into drinking. Pt is looking forward to" getting sober".   Pt remains free of injury, pain managed adequately, no s/s of distress. 24 hour chart check completed. Will continue to monitor.           "

## 2017-04-27 NOTE — PLAN OF CARE
CM met with pt to discuss d/c plan.  CM confirmed that pt has an appointment at [facility] at 9 AM Friday morning.  Pt verbalized he is confident he can remain sober from any and all substances prior to appointment with rehab center.  Pt's family support pt's statements and pt was able to d/c successfully with no further CM needs.       04/27/17 0823   Final Note   Assessment Type Final Discharge Note   Discharge Disposition Home   Discharge planning education complete? Yes   Hospital Follow Up  Appt(s) scheduled? Yes   Discharge plans and expectations educations in teach back method with documentation complete? Yes   Discharge/Hospital Encounter Summary to (non[facility]) PCP n/a   Referral to Outpatient Case Management complete? n/a   Referral to / orders for Home Health Complete? n/a   30 day supply of medicines given at discharge, if documented non-compliance / non-adherence? n/a   Any social issues identified prior to discharge? Yes   Did you assess the readiness or willingness of the family or caregiver to support self management of care? Yes   Right Care Referral Info   Post Acute Recommendation Other   Referral Type Outpatient rehab    Facility Name [facility]   [address] [place], [place]

## 2017-06-02 ENCOUNTER — HOSPITAL ENCOUNTER (EMERGENCY)
Facility: HOSPITAL | Age: 62
Discharge: HOME | End: 2017-06-02
Attending: EMERGENCY MEDICINE
Payer: COMMERCIAL

## 2017-06-02 VITALS
HEART RATE: 74 BPM | DIASTOLIC BLOOD PRESSURE: 88 MMHG | SYSTOLIC BLOOD PRESSURE: 131 MMHG | OXYGEN SATURATION: 95 % | TEMPERATURE: 98 F | WEIGHT: 175 LBS | RESPIRATION RATE: 18 BRPM | HEIGHT: 69 IN | BODY MASS INDEX: 25.92 KG/M2

## 2017-06-02 DIAGNOSIS — F10.20 ALCOHOLISM: ICD-10-CM

## 2017-06-02 DIAGNOSIS — E87.20 LACTIC ACIDEMIA: ICD-10-CM

## 2017-06-02 DIAGNOSIS — R11.2 NAUSEA AND VOMITING, INTRACTABILITY OF VOMITING NOT SPECIFIED, UNSPECIFIED VOMITING TYPE: ICD-10-CM

## 2017-06-02 DIAGNOSIS — K29.20 ACUTE ALCOHOLIC GASTRITIS WITHOUT HEMORRHAGE: Primary | ICD-10-CM

## 2017-06-02 DIAGNOSIS — E87.6 HYPOKALEMIA: ICD-10-CM

## 2017-06-02 LAB
ALBUMIN SERPL BCP-MCNC: 4.7 G/DL
ALP SERPL-CCNC: 73 U/L
ALT SERPL W/O P-5'-P-CCNC: 68 U/L
ANION GAP SERPL CALC-SCNC: 13 MMOL/L
ANION GAP SERPL CALC-SCNC: 24 MMOL/L
APTT BLDCRRT: 23.4 SEC
AST SERPL-CCNC: 67 U/L
BACTERIA #/AREA URNS AUTO: ABNORMAL /HPF
BASOPHILS # BLD AUTO: 0.01 K/UL
BASOPHILS NFR BLD: 0.1 %
BILIRUB SERPL-MCNC: 2.6 MG/DL
BILIRUB UR QL STRIP: NEGATIVE
BUN SERPL-MCNC: 10 MG/DL
BUN SERPL-MCNC: 11 MG/DL
CALCIUM SERPL-MCNC: 8.3 MG/DL
CALCIUM SERPL-MCNC: 9.7 MG/DL
CHLORIDE SERPL-SCNC: 86 MMOL/L
CHLORIDE SERPL-SCNC: 95 MMOL/L
CLARITY UR REFRACT.AUTO: CLEAR
CO2 SERPL-SCNC: 23 MMOL/L
CO2 SERPL-SCNC: 26 MMOL/L
COLOR UR AUTO: YELLOW
CREAT SERPL-MCNC: 0.8 MG/DL
CREAT SERPL-MCNC: 1 MG/DL
DIFFERENTIAL METHOD: ABNORMAL
EOSINOPHIL # BLD AUTO: 0 K/UL
EOSINOPHIL NFR BLD: 0 %
ERYTHROCYTE [DISTWIDTH] IN BLOOD BY AUTOMATED COUNT: 14 %
EST. GFR  (AFRICAN AMERICAN): >60 ML/MIN/1.73 M^2
EST. GFR  (AFRICAN AMERICAN): >60 ML/MIN/1.73 M^2
EST. GFR  (NON AFRICAN AMERICAN): >60 ML/MIN/1.73 M^2
EST. GFR  (NON AFRICAN AMERICAN): >60 ML/MIN/1.73 M^2
GLUCOSE SERPL-MCNC: 114 MG/DL
GLUCOSE SERPL-MCNC: 147 MG/DL
GLUCOSE UR QL STRIP: NEGATIVE
HCT VFR BLD AUTO: 43.2 %
HGB BLD-MCNC: 16.1 G/DL
HGB UR QL STRIP: NEGATIVE
HYALINE CASTS UR QL AUTO: 15 /LPF
INR PPP: 1
KETONES UR QL STRIP: NEGATIVE
LACTATE SERPL-SCNC: 1.6 MMOL/L
LACTATE SERPL-SCNC: 3.8 MMOL/L
LEUKOCYTE ESTERASE UR QL STRIP: NEGATIVE
LYMPHOCYTES # BLD AUTO: 0.9 K/UL
LYMPHOCYTES NFR BLD: 11.5 %
MCH RBC QN AUTO: 36.3 PG
MCHC RBC AUTO-ENTMCNC: 37.3 %
MCV RBC AUTO: 97 FL
MICROSCOPIC COMMENT: ABNORMAL
MONOCYTES # BLD AUTO: 0.4 K/UL
MONOCYTES NFR BLD: 5.3 %
NEUTROPHILS # BLD AUTO: 6.8 K/UL
NEUTROPHILS NFR BLD: 82.9 %
NITRITE UR QL STRIP: NEGATIVE
PH UR STRIP: 6 [PH] (ref 5–8)
PLATELET # BLD AUTO: 112 K/UL
PMV BLD AUTO: 9.4 FL
POTASSIUM SERPL-SCNC: 3.2 MMOL/L
POTASSIUM SERPL-SCNC: 3.7 MMOL/L
PROT SERPL-MCNC: 8.1 G/DL
PROT UR QL STRIP: ABNORMAL
PROTHROMBIN TIME: 10.3 SEC
RBC # BLD AUTO: 4.44 M/UL
RBC #/AREA URNS AUTO: 0 /HPF (ref 0–4)
SODIUM SERPL-SCNC: 133 MMOL/L
SODIUM SERPL-SCNC: 134 MMOL/L
SP GR UR STRIP: 1.01 (ref 1–1.03)
TROPONIN I SERPL DL<=0.01 NG/ML-MCNC: <0.006 NG/ML
URN SPEC COLLECT METH UR: ABNORMAL
UROBILINOGEN UR STRIP-ACNC: ABNORMAL EU/DL
WBC # BLD AUTO: 8.18 K/UL
WBC #/AREA URNS AUTO: 0 /HPF (ref 0–5)

## 2017-06-02 PROCEDURE — 96366 THER/PROPH/DIAG IV INF ADDON: CPT

## 2017-06-02 PROCEDURE — 99900035 HC TECH TIME PER 15 MIN (STAT)

## 2017-06-02 PROCEDURE — 85730 THROMBOPLASTIN TIME PARTIAL: CPT

## 2017-06-02 PROCEDURE — 93010 ELECTROCARDIOGRAM REPORT: CPT | Mod: ,,, | Performed by: INTERNAL MEDICINE

## 2017-06-02 PROCEDURE — 84484 ASSAY OF TROPONIN QUANT: CPT

## 2017-06-02 PROCEDURE — 96361 HYDRATE IV INFUSION ADD-ON: CPT

## 2017-06-02 PROCEDURE — 96375 TX/PRO/DX INJ NEW DRUG ADDON: CPT

## 2017-06-02 PROCEDURE — 80053 COMPREHEN METABOLIC PANEL: CPT

## 2017-06-02 PROCEDURE — 99284 EMERGENCY DEPT VISIT MOD MDM: CPT | Mod: 25

## 2017-06-02 PROCEDURE — 96365 THER/PROPH/DIAG IV INF INIT: CPT

## 2017-06-02 PROCEDURE — 96376 TX/PRO/DX INJ SAME DRUG ADON: CPT

## 2017-06-02 PROCEDURE — 85025 COMPLETE CBC W/AUTO DIFF WBC: CPT

## 2017-06-02 PROCEDURE — 80048 BASIC METABOLIC PNL TOTAL CA: CPT

## 2017-06-02 PROCEDURE — 85610 PROTHROMBIN TIME: CPT

## 2017-06-02 PROCEDURE — 83605 ASSAY OF LACTIC ACID: CPT

## 2017-06-02 PROCEDURE — 93005 ELECTROCARDIOGRAM TRACING: CPT

## 2017-06-02 PROCEDURE — 63600175 PHARM REV CODE 636 W HCPCS: Performed by: EMERGENCY MEDICINE

## 2017-06-02 PROCEDURE — 25000003 PHARM REV CODE 250: Performed by: EMERGENCY MEDICINE

## 2017-06-02 PROCEDURE — 81000 URINALYSIS NONAUTO W/SCOPE: CPT

## 2017-06-02 RX ORDER — LORAZEPAM 2 MG/ML
INJECTION INTRAMUSCULAR
Status: DISCONTINUED
Start: 2017-06-02 | End: 2017-06-02 | Stop reason: WASHOUT

## 2017-06-02 RX ORDER — ONDANSETRON 2 MG/ML
4 INJECTION INTRAMUSCULAR; INTRAVENOUS
Status: COMPLETED | OUTPATIENT
Start: 2017-06-02 | End: 2017-06-02

## 2017-06-02 RX ORDER — LORAZEPAM 2 MG/ML
1 INJECTION INTRAMUSCULAR
Status: COMPLETED | OUTPATIENT
Start: 2017-06-02 | End: 2017-06-02

## 2017-06-02 RX ORDER — POTASSIUM CHLORIDE 20 MEQ/15ML
20 SOLUTION ORAL
Status: COMPLETED | OUTPATIENT
Start: 2017-06-02 | End: 2017-06-02

## 2017-06-02 RX ORDER — ALPRAZOLAM 0.25 MG/1
0.5 TABLET ORAL 3 TIMES DAILY PRN
Qty: 22 TABLET | Refills: 0 | Status: SHIPPED | OUTPATIENT
Start: 2017-06-02 | End: 2017-08-10

## 2017-06-02 RX ORDER — ONDANSETRON 4 MG/1
4 TABLET, FILM COATED ORAL EVERY 8 HOURS PRN
Qty: 12 TABLET | Refills: 0 | Status: SHIPPED | OUTPATIENT
Start: 2017-06-02 | End: 2017-08-15

## 2017-06-02 RX ADMIN — ONDANSETRON 4 MG: 2 INJECTION INTRAMUSCULAR; INTRAVENOUS at 07:06

## 2017-06-02 RX ADMIN — SODIUM CHLORIDE 1000 ML: 0.9 INJECTION, SOLUTION INTRAVENOUS at 08:06

## 2017-06-02 RX ADMIN — LORAZEPAM 1 MG: 2 INJECTION INTRAMUSCULAR; INTRAVENOUS at 06:06

## 2017-06-02 RX ADMIN — POTASSIUM CHLORIDE 20 MEQ: 20 SOLUTION ORAL at 08:06

## 2017-06-02 RX ADMIN — SODIUM CHLORIDE 2000 ML: 0.9 INJECTION, SOLUTION INTRAVENOUS at 06:06

## 2017-06-02 RX ADMIN — ONDANSETRON 4 MG: 2 INJECTION INTRAMUSCULAR; INTRAVENOUS at 06:06

## 2017-06-02 RX ADMIN — LORAZEPAM 1 MG: 2 INJECTION INTRAMUSCULAR; INTRAVENOUS at 07:06

## 2017-06-02 RX ADMIN — THIAMINE HYDROCHLORIDE 100 MG: 100 INJECTION, SOLUTION INTRAMUSCULAR; INTRAVENOUS at 07:06

## 2017-06-02 NOTE — ED PROVIDER NOTES
"   History      Chief Complaint   Patient presents with    Nausea     Pt states, "I've been drinking too much. I have been drinking about a fifth a day for about 2 weeks, and now I'm nauseated and dehydrated." Last ETOH intake yesterday.        Review of patient's allergies indicates:  No Known Allergies     HPI   HPI    6/2/2017, 6:04 AM   History obtained from the patient      History of Present Illness: Barb Carson is a 61 y.o. male patient who presents to the Emergency Department for nausea and vomiting.  The patient notes that he started feeling some nausea with vomiting that started yesterday.  The patient notes that he has been drinking more heavily over the past 2-3 weeks.  The patient notes that he's been drinking about a fifth of liquor every day for the last 2 weeks.  The patient notes that he's been drinking on a daily basis but not that much since he was a young adult.  The patient notes that he has not been able to keep anything down since yesterday neither solid or liquids.  The patient denies any shortness of breath or chest pain but notes some mild abdominal pain.  The patient also endorses that his hands are shaky.  The patient denies being around anyone else with the same symptomatology.  There are no other major concerns or complaints noted at this time.      Arrival mode: Personal vehicle    PCP: Tony Paul DO       Past Medical History:  Past Medical History:   Diagnosis Date    Alcohol abuse     Anxiety        Past Surgical History:  History reviewed. No pertinent surgical history.      Family History:  History reviewed. No pertinent family history.    Social History:  Social History     Social History Main Topics    Smoking status: Current Every Day Smoker     Packs/day: 2.00     Types: Cigarettes    Smokeless tobacco: Not on file    Alcohol use Yes      Comment: daily- fifth    Drug use: No    Sexual activity: No       ROS   Review of Systems   Constitutional: Negative for " fever.   HENT: Negative for sore throat.    Respiratory: Negative for shortness of breath.    Cardiovascular: Negative for chest pain.   Gastrointestinal: Positive for abdominal pain, nausea and vomiting.   Genitourinary: Negative for dysuria.   Musculoskeletal: Negative for back pain.   Skin: Negative for rash.   Neurological: Negative for weakness.   Hematological: Does not bruise/bleed easily.   All other systems reviewed and are negative.      Physical Exam      Initial Vitals [06/02/17 0544]   BP Pulse Resp Temp SpO2   (!) 143/86 (!) 115 20 97.9 °F (36.6 °C) 95 %      Physical Exam  Nursing Notes and Vital Signs Reviewed.  Constitutional: Patient is in mild distress. Well-developed and well-nourished.  Head: Atraumatic. Normocephalic.  Eyes: PERRL. EOM intact. Conjunctivae are not pale. No scleral icterus.  ENT: Mucous membranes are moist. Oropharynx is clear and symmetric.    Neck: Supple. Full ROM. No lymphadenopathy.  Cardiovascular: Regular rate. Regular rhythm. No murmurs, rubs, or gallops. Distal pulses are 2+ and symmetric.  Pulmonary/Chest: No respiratory distress. Clear to auscultation bilaterally. No wheezing, rales, or rhonchi.  Abdominal: Soft and non-distended.  There is no tenderness.  No rebound, guarding, or rigidity. Good bowel sounds.  Genitourinary: No CVA tenderness  Musculoskeletal: Moves all extremities. No obvious deformities. No edema. No calf tenderness.  Skin: Warm and dry.  Neurological:  Alert, awake, and appropriate.  Normal speech.  No acute focal neurological deficits are appreciated.  Noted shaking to the bilateral hands.  Psychiatric: Normal affect. Good eye contact. Appropriate in content.    ED Course    Procedures  ED Vital Signs:  Vitals:    06/02/17 0544 06/02/17 0601 06/02/17 0701 06/02/17 0731   BP: (!) 143/86 (!) 148/82 137/88 134/84   Pulse: (!) 115 73 77 78   Resp: 20 16 (!) 21 20   Temp: 97.9 °F (36.6 °C)      TempSrc: Oral      SpO2: 95% 98% 95% 95%   Weight: 79.4 kg  "(175 lb)      Height: 5' 9" (1.753 m)       06/02/17 0807 06/02/17 0831 06/02/17 1131 06/02/17 1219   BP: 134/84 133/89 130/86 131/88   Pulse: 76 80 75 74   Resp: 20 20 20 18   Temp:       TempSrc:       SpO2: 95% 95% 96% 95%   Weight:       Height:           Abnormal Lab Results:  Labs Reviewed   CBC W/ AUTO DIFFERENTIAL - Abnormal; Notable for the following:        Result Value    RBC 4.44 (*)     MCH 36.3 (*)     MCHC 37.3 (*)     Platelets 112 (*)     Lymph # 0.9 (*)     Gran% 82.9 (*)     Lymph% 11.5 (*)     All other components within normal limits   COMPREHENSIVE METABOLIC PANEL - Abnormal; Notable for the following:     Sodium 133 (*)     Potassium 3.2 (*)     Chloride 86 (*)     Glucose 147 (*)     Total Bilirubin 2.6 (*)     AST 67 (*)     ALT 68 (*)     Anion Gap 24 (*)     All other components within normal limits   URINALYSIS - Abnormal; Notable for the following:     Protein, UA 2+ (*)     Urobilinogen, UA 2.0-3.0 (*)     All other components within normal limits   LACTIC ACID, PLASMA - Abnormal; Notable for the following:     Lactate (Lactic Acid) 3.8 (*)     All other components within normal limits    Narrative:       La critical result(s) called and verbal readback obtained from   Minna Patiño rn, 06/02/2017 07:58   BASIC METABOLIC PANEL - Abnormal; Notable for the following:     Sodium 134 (*)     Glucose 114 (*)     Calcium 8.3 (*)     All other components within normal limits   URINALYSIS MICROSCOPIC - Abnormal; Notable for the following:     Hyaline Casts, UA 15 (*)     All other components within normal limits   APTT   PROTIME-INR   TROPONIN I   LACTIC ACID, PLASMA        All Lab Results:  Results for orders placed or performed during the hospital encounter of 06/02/17   APTT   Result Value Ref Range    aPTT 23.4 21.0 - 32.0 sec   Protime-INR   Result Value Ref Range    Prothrombin Time 10.3 9.0 - 12.5 sec    INR 1.0 0.8 - 1.2   CBC auto differential   Result Value Ref Range    WBC 8.18 3.90 " - 12.70 K/uL    RBC 4.44 (L) 4.60 - 6.20 M/uL    Hemoglobin 16.1 14.0 - 18.0 g/dL    Hematocrit 43.2 40.0 - 54.0 %    MCV 97 82 - 98 fL    MCH 36.3 (H) 27.0 - 31.0 pg    MCHC 37.3 (H) 32.0 - 36.0 %    RDW 14.0 11.5 - 14.5 %    Platelets 112 (L) 150 - 350 K/uL    MPV 9.4 9.2 - 12.9 fL    Gran # 6.8 1.8 - 7.7 K/uL    Lymph # 0.9 (L) 1.0 - 4.8 K/uL    Mono # 0.4 0.3 - 1.0 K/uL    Eos # 0.0 0.0 - 0.5 K/uL    Baso # 0.01 0.00 - 0.20 K/uL    Gran% 82.9 (H) 38.0 - 73.0 %    Lymph% 11.5 (L) 18.0 - 48.0 %    Mono% 5.3 4.0 - 15.0 %    Eosinophil% 0.0 0.0 - 8.0 %    Basophil% 0.1 0.0 - 1.9 %    Differential Method Automated    Comprehensive metabolic panel   Result Value Ref Range    Sodium 133 (L) 136 - 145 mmol/L    Potassium 3.2 (L) 3.5 - 5.1 mmol/L    Chloride 86 (L) 95 - 110 mmol/L    CO2 23 23 - 29 mmol/L    Glucose 147 (H) 70 - 110 mg/dL    BUN, Bld 11 6 - 20 mg/dL    Creatinine 1.0 0.5 - 1.4 mg/dL    Calcium 9.7 8.7 - 10.5 mg/dL    Total Protein 8.1 6.0 - 8.4 g/dL    Albumin 4.7 3.5 - 5.2 g/dL    Total Bilirubin 2.6 (H) 0.1 - 1.0 mg/dL    Alkaline Phosphatase 73 55 - 135 U/L    AST 67 (H) 10 - 40 U/L    ALT 68 (H) 10 - 44 U/L    Anion Gap 24 (H) 8 - 16 mmol/L    eGFR if African American >60.0 >60 mL/min/1.73 m^2    eGFR if non African American >60.0 >60 mL/min/1.73 m^2   Troponin I   Result Value Ref Range    Troponin I <0.006 0.000 - 0.026 ng/mL   Urinalysis   Result Value Ref Range    Specimen UA Urine, Clean Catch     Color, UA Yellow Yellow, Straw, Daily    Appearance, UA Clear Clear    pH, UA 6.0 5.0 - 8.0    Specific Gravity, UA 1.015 1.005 - 1.030    Protein, UA 2+ (A) Negative    Glucose, UA Negative Negative    Ketones, UA Negative Negative    Bilirubin (UA) Negative Negative    Occult Blood UA Negative Negative    Nitrite, UA Negative Negative    Urobilinogen, UA 2.0-3.0 (A) <2.0 EU/dL    Leukocytes, UA Negative Negative   Lactic acid, plasma   Result Value Ref Range    Lactate (Lactic Acid) 3.8 (HH) 0.5 -  2.2 mmol/L   Basic metabolic panel   Result Value Ref Range    Sodium 134 (L) 136 - 145 mmol/L    Potassium 3.7 3.5 - 5.1 mmol/L    Chloride 95 95 - 110 mmol/L    CO2 26 23 - 29 mmol/L    Glucose 114 (H) 70 - 110 mg/dL    BUN, Bld 10 6 - 20 mg/dL    Creatinine 0.8 0.5 - 1.4 mg/dL    Calcium 8.3 (L) 8.7 - 10.5 mg/dL    Anion Gap 13 8 - 16 mmol/L    eGFR if African American >60.0 >60 mL/min/1.73 m^2    eGFR if non African American >60.0 >60 mL/min/1.73 m^2   Lactic acid, plasma   Result Value Ref Range    Lactate (Lactic Acid) 1.6 0.5 - 2.2 mmol/L   Urinalysis Microscopic   Result Value Ref Range    RBC, UA 0 0 - 4 /hpf    WBC, UA 0 0 - 5 /hpf    Bacteria, UA None None-Occ /hpf    Hyaline Casts, UA 15 (A) 0-1/lpf /lpf    Microscopic Comment SEE COMMENT          Imaging Results:  Imaging Results    None           EKG Readings: (Independently Interpreted)   Initial Reading: No STEMI. Rhythm: Normal Sinus Rhythm. Heart Rate: 74. Ectopy: No Ectopy. Conduction: RBBB. Axis: Normal.        The Emergency Provider reviewed the vital signs and test results, which are outlined above.    ED Discussion     7:09 AM - Re-evaluation:  The patient is resting comfortably and is in no acute distress but still nauseated and still having some tremors to his hands. He states that his symptoms have improved after treatment within ER. Discussed test results and notified of pending labs. Answered questions at this time.       ED Medication(s):  Medications   sodium chloride 0.9% bolus 2,000 mL (0 mLs Intravenous Stopped 6/2/17 0702)   lorazepam injection 1 mg (1 mg Intravenous Given 6/2/17 0616)   ondansetron injection 4 mg (4 mg Intravenous Given 6/2/17 0617)   ondansetron injection 4 mg (4 mg Intravenous Given 6/2/17 0709)   lorazepam injection 1 mg (1 mg Intravenous Given 6/2/17 0710)   thiamine (B-1) 100 mg in dextrose 5 % 50 mL IVPB (0 mg Intravenous Stopped 6/2/17 1109)   potassium chloride 10% solution 20 mEq (20 mEq Oral Given 6/2/17  0800)   sodium chloride 0.9% bolus 1,000 mL (0 mLs Intravenous Stopped 6/2/17 0851)       Discharge Medication List as of 6/2/2017 12:17 PM      START taking these medications    Details   alprazolam (XANAX) 0.25 MG tablet Take 2 tablets (0.5 mg total) by mouth 3 (three) times daily as needed (Agitation). Do not take this medication if you're drinking alcohol, Starting Fri 6/2/2017, Until Sun 7/2/2017, Print      ondansetron (ZOFRAN) 4 MG tablet Take 1 tablet (4 mg total) by mouth every 8 (eight) hours as needed., Starting Fri 6/2/2017, Print             Follow-up Information     Tony Paul, DO in 3 days.    Specialty:  Family Medicine  Contact information:  [address] [address] [id number] [phone]             [facility].    Specialty:  Emergency Medicine  Why:  If symptoms worsen, Or worsening condition or any other major concern  Contact information:  [address] [address] [address] [phone]                   Medical Decision Making              Scribe Attestation:   Scribe #1: I performed the above scribed service and the documentation accurately describes the services I performed. I attest to the accuracy of the note.    Attending:   Physician Attestation Statement for Scribe #1: I, No att. providers found, personally performed the services described in this documentation, as scribed by Jarad Rodriguez, in my presence, and it is both accurate and complete.          Clinical Impression       ICD-10-CM ICD-9-CM   1. Acute alcoholic gastritis without hemorrhage K29.20 535.30   2. Nausea and vomiting, intractability of vomiting not specified, unspecified vomiting type R11.2 787.01   3. Lactic acidemia E87.2 276.2   4. Hypokalemia E87.6 276.8   5. Alcoholism F10.20 303.90               Jarad Rodriguez MD  06/19/17 1021

## 2017-06-02 NOTE — ED NOTES
"Report received from Travis COSTA, pt in bed SR up x 2, 20 ga iv to L forearm flushes well, pt has noted muscle twitching but states " I feel much better now than I did earlier. Will continue to monitor.  "

## 2017-06-02 NOTE — ED NOTES
Patient verbally verified and Spelled Full Name and Date of Birth.  C/O nausea, vomiting & diarrhea  & excessive weakness since yesterday.  States hes been drinking 1pint - 1 fifth [place]  daily  for past 3 wks.  Hx alcoholism  X past 3 -4 yrs.  Pt denies blood in stool or emesis.  Trembling. LOC: The patient is awake, alert and aware of environment with an appropriate affect, the patient is oriented x 3 and speaking appropriately.  APPEARANCE: Patient resting comfortably and in no acute distress, patient is clean and well groomed, patient's clothing is properly fastened.  HEENT: Brief WNL  SKIN: Brief WNL.   MUSCULOSKELETAL: Brief WNLpositive muscle twitching throughout body  RESPIRATORY: Brief WNL, denIes SOB  CARDIAC: Brief WNL, sinus at 84/min, denies chest pain  GASTRO:positive for N,V & D  : Brief WNL  Peripheral Vasc: Brief WNL  NEURO: Brief WNL, alert, voice soft & states he drinks because of depression  PSYCH: Brief WNLFall precautions in place at this time: Patient side rails are up x 2, bed is low and locked, call light is in reach of Ppt and fall risk band applied to rt wrist. pt educated on fall risk and verbalized understanding.

## 2017-08-10 ENCOUNTER — OFFICE VISIT (OUTPATIENT)
Dept: INTERNAL MEDICINE | Facility: CLINIC | Age: 62
End: 2017-08-10
Payer: COMMERCIAL

## 2017-08-10 VITALS
OXYGEN SATURATION: 96 % | HEART RATE: 67 BPM | BODY MASS INDEX: 27.92 KG/M2 | DIASTOLIC BLOOD PRESSURE: 60 MMHG | RESPIRATION RATE: 16 BRPM | SYSTOLIC BLOOD PRESSURE: 97 MMHG | HEIGHT: 69 IN | WEIGHT: 188.5 LBS | TEMPERATURE: 98 F

## 2017-08-10 DIAGNOSIS — H61.21 IMPACTED CERUMEN OF RIGHT EAR: Primary | ICD-10-CM

## 2017-08-10 DIAGNOSIS — F10.11 H/O ALCOHOL ABUSE: ICD-10-CM

## 2017-08-10 PROCEDURE — 3008F BODY MASS INDEX DOCD: CPT | Mod: S$GLB,,, | Performed by: FAMILY MEDICINE

## 2017-08-10 PROCEDURE — 99999 PR PBB SHADOW E&M-EST. PATIENT-LVL III: CPT | Mod: PBBFAC,,, | Performed by: FAMILY MEDICINE

## 2017-08-10 PROCEDURE — 99213 OFFICE O/P EST LOW 20 MIN: CPT | Mod: S$GLB,,, | Performed by: FAMILY MEDICINE

## 2017-08-10 RX ORDER — IBUPROFEN 600 MG/1
TABLET ORAL
COMMUNITY
Start: 2017-06-10 | End: 2017-08-15

## 2017-08-10 NOTE — PATIENT INSTRUCTIONS
Earwax Removal    The ear canal makes earwax from the canals lining. The ears make wax to lubricate and protect the ear canal. The ear canal is the tube that connects the middle ear to the outside of the ear. The wax protects the ear from bacteria, infection, and damage from water or trauma.  The wax that forms in the canal naturally moves toward the outside of the ear and falls out. In some cases, the ear may make too much wax. If the wax causes problems or keeps the healthcare provider from seeing into the ear, the extra wax may be removed.  Too much wax can affect your hearing. It can cause itching. In rare cases, it can be painful. Earwax should not be removed unless it is causing a problem. You should not stick objects into your ear to remove wax unless told to do so by your healthcare provider.  Healthcare providers can remove earwax safely. It is important to stay still during the procedure to avoid damage to the ear canal. But removing earwax generally doesnt hurt. You will not usually need anesthesia or pain medicine when the provider removes the earwax.  A number of conditions lead to earwax buildup. These include some skin problems, a narrow ear canal, or ears that make too much earwax. Using cotton swabs in the canal pushes earwax deeper into the ear and contributes to the buildup of earwax.  Home care  · The healthcare provider may recommend mineral oil or an over-the-counter eardrop to use at home to soften the earwax. Use these products only if the provider recommends them. Use these products only if the provider recommends them. Carefully follow the instructions given.  · Dont use mineral oil or OTC eardrops if you might have an ear infection or a ruptured eardrum. Tell your healthcare provider right away if you have diabetes or an immune disorder.  · Dont use cotton swabs in your ears. Cotton swabs may push wax deeper into the ear canal or damage the eardrum. Use cotton gauze or a wet  washcloth  to gently remove wax on the outside of the ear and around the opening to the ear canal.  · Don't use any probing device or object such as cotton-tipped swabs or tasneem pins to clean the inside of your ears.  · Dont use ear candles to clean your ears. Candling can be dangerous. It can burn the ear canal. It can also make the condition worse instead of better.  · Dont use cold water to rinse the ear. This will make you dizzy. If your provider tells you to rinse your ear, use only warm water or follow his or her instructions.  · Check the ear for signs of infection or irritation listed below under When to seek medical advice.  Steps for using eardrops  1. Warm the medicine bottle by rubbing it between your hands for a few minutes.  2. Lie down on your side, with the affected ear up.  3. Place the recommended number of drops in the ear. Wet a cotton ball with the medicine. Gently put the cotton ball into the ear opening.  Follow-up care  Follow up with your healthcare provider, or as directed.  When to seek medical advice  Call the provider right away if you have:  · Ear pain that gets worse  · Fever of 100.4F°F (38°C) or higher, or as directed by your healthcare provider  · Worsening wax buildup  · Severe pain, dizziness, or nausea  · Bleeding from the ear  · Hearing problems  · Signs of irritation from the eardrops, such as burning, stinging, or swelling and tenderness  · Foul-smelling fluid draining from the ear  · Swelling, redness, or tenderness of the outer ear  · Headache, neck pain, or stiff neck  Date Last Reviewed: 3/22/2015  © 8269-4487 [employer]. [address] [place] [address], [place] [address]. All rights reserved. This information is not intended as a substitute for professional medical care. Always follow your healthcare professional's instructions.

## 2017-08-15 PROBLEM — F10.10 ALCOHOL ABUSE: Status: RESOLVED | Noted: 2017-03-16 | Resolved: 2017-08-15

## 2017-08-15 PROBLEM — E87.6 HYPOKALEMIA: Status: RESOLVED | Noted: 2017-04-25 | Resolved: 2017-08-15

## 2017-08-15 PROBLEM — F10.11 H/O ALCOHOL ABUSE: Status: ACTIVE | Noted: 2017-08-15

## 2017-08-15 PROBLEM — E87.20 LACTIC ACIDOSIS: Status: RESOLVED | Noted: 2017-04-24 | Resolved: 2017-08-15

## 2017-08-16 NOTE — PROGRESS NOTES
"Subjective:       Patient ID: Barb Carson is a 61 y.o. male.    Chief Complaint: Ear Fullness (rt)    HPI  Came in today complaining of right ear fullness for the last several days and he noticed a decrease in his hearing. No known foreign object in ear. He does use q-tips at times. No fever. No recent URI. Never had similar problem.     Reported that since the last time he saw me he has been able to complete a rehab program in [place], [place] and hasn't had a drink in over a month. This helped him with depression, anxiety and he is off his medications.     No family history on file.  No current outpatient prescriptions on file.    Review of Systems   Constitutional: Negative for chills and fever.   HENT: Positive for ear pain (and fullness feeling) and hearing loss.    Eyes: Negative for visual disturbance.   Respiratory: Negative for cough and shortness of breath.    Cardiovascular: Negative for chest pain.   Gastrointestinal: Negative for abdominal pain.   Neurological: Negative for dizziness.       Objective:   BP 97/60 (BP Location: Left arm, Patient Position: Sitting, BP Method: Large (Automatic))   Pulse 67   Temp 98 °F (36.7 °C) (Tympanic)   Resp 16   Ht 5' 9" (1.753 m)   Wt 85.5 kg (188 lb 7.9 oz)   SpO2 96%   BMI 27.84 kg/m²      Physical Exam   Constitutional: He is oriented to person, place, and time. He appears well-developed and well-nourished.   HENT:   Head: Normocephalic and atraumatic.   Initially had impacted cerumen of right ear but after irrigation had normal appearing TM and improved hearing   Eyes: Conjunctivae are normal.   Cardiovascular: Normal rate.    Pulmonary/Chest: Effort normal. No respiratory distress.   Musculoskeletal: He exhibits no edema.   Neurological: He is alert and oriented to person, place, and time. Coordination normal.   Skin: Skin is warm and dry. No rash noted.   Psychiatric: He has a normal mood and affect. His behavior is normal.   Vitals reviewed.      Assessment " & Plan     1. Impacted cerumen of right ear  Successfully removed with irrigation alone and hearing was much improved afterwards. Advised to avoid future  q-tip usage.     2. H/O alcohol abuse  Successfully completed rehab program. I congratulated him and recommended that he consider going to AA meetings on a regular basis.

## 2017-09-08 ENCOUNTER — HOSPITAL ENCOUNTER (EMERGENCY)
Facility: HOSPITAL | Age: 62
Discharge: HOME | End: 2017-09-08
Attending: EMERGENCY MEDICINE
Payer: COMMERCIAL

## 2017-09-08 ENCOUNTER — TELEPHONE (OUTPATIENT)
Dept: INTERNAL MEDICINE | Facility: CLINIC | Age: 62
End: 2017-09-08

## 2017-09-08 VITALS
HEART RATE: 72 BPM | HEIGHT: 69 IN | SYSTOLIC BLOOD PRESSURE: 105 MMHG | RESPIRATION RATE: 16 BRPM | WEIGHT: 180 LBS | DIASTOLIC BLOOD PRESSURE: 54 MMHG | TEMPERATURE: 98 F | OXYGEN SATURATION: 100 % | BODY MASS INDEX: 26.66 KG/M2

## 2017-09-08 DIAGNOSIS — F10.10 ALCOHOL ABUSE: Primary | ICD-10-CM

## 2017-09-08 DIAGNOSIS — R11.10 VOMITING, INTRACTABILITY OF VOMITING NOT SPECIFIED, PRESENCE OF NAUSEA NOT SPECIFIED, UNSPECIFIED VOMITING TYPE: ICD-10-CM

## 2017-09-08 LAB
ALBUMIN SERPL BCP-MCNC: 4.3 G/DL
ALP SERPL-CCNC: 97 U/L
ALT SERPL W/O P-5'-P-CCNC: 32 U/L
AMYLASE SERPL-CCNC: 120 U/L
ANION GAP SERPL CALC-SCNC: 23 MMOL/L
AST SERPL-CCNC: 39 U/L
BASOPHILS # BLD AUTO: 0.04 K/UL
BASOPHILS NFR BLD: 0.5 %
BILIRUB SERPL-MCNC: 1 MG/DL
BUN SERPL-MCNC: 12 MG/DL
CALCIUM SERPL-MCNC: 9.8 MG/DL
CHLORIDE SERPL-SCNC: 97 MMOL/L
CO2 SERPL-SCNC: 19 MMOL/L
CREAT SERPL-MCNC: 1 MG/DL
DIFFERENTIAL METHOD: ABNORMAL
EOSINOPHIL # BLD AUTO: 0 K/UL
EOSINOPHIL NFR BLD: 0.3 %
ERYTHROCYTE [DISTWIDTH] IN BLOOD BY AUTOMATED COUNT: 13.8 %
EST. GFR  (AFRICAN AMERICAN): >60 ML/MIN/1.73 M^2
EST. GFR  (NON AFRICAN AMERICAN): >60 ML/MIN/1.73 M^2
ETHANOL SERPL-MCNC: 131 MG/DL
GLUCOSE SERPL-MCNC: 101 MG/DL
HCT VFR BLD AUTO: 42.4 %
HGB BLD-MCNC: 15.4 G/DL
LIPASE SERPL-CCNC: 27 U/L
LYMPHOCYTES # BLD AUTO: 2.4 K/UL
LYMPHOCYTES NFR BLD: 31.1 %
MCH RBC QN AUTO: 32.8 PG
MCHC RBC AUTO-ENTMCNC: 36.3 G/DL
MCV RBC AUTO: 90 FL
MONOCYTES # BLD AUTO: 0.4 K/UL
MONOCYTES NFR BLD: 5.4 %
NEUTROPHILS # BLD AUTO: 4.8 K/UL
NEUTROPHILS NFR BLD: 62.4 %
PLATELET # BLD AUTO: 229 K/UL
PMV BLD AUTO: 8.4 FL
POTASSIUM SERPL-SCNC: 4.1 MMOL/L
PROT SERPL-MCNC: 8.8 G/DL
RBC # BLD AUTO: 4.69 M/UL
SODIUM SERPL-SCNC: 139 MMOL/L
WBC # BLD AUTO: 7.72 K/UL

## 2017-09-08 PROCEDURE — 83690 ASSAY OF LIPASE: CPT

## 2017-09-08 PROCEDURE — 96366 THER/PROPH/DIAG IV INF ADDON: CPT

## 2017-09-08 PROCEDURE — 96375 TX/PRO/DX INJ NEW DRUG ADDON: CPT

## 2017-09-08 PROCEDURE — 85025 COMPLETE CBC W/AUTO DIFF WBC: CPT

## 2017-09-08 PROCEDURE — 96361 HYDRATE IV INFUSION ADD-ON: CPT

## 2017-09-08 PROCEDURE — 99284 EMERGENCY DEPT VISIT MOD MDM: CPT | Mod: 25

## 2017-09-08 PROCEDURE — 25000003 PHARM REV CODE 250: Performed by: EMERGENCY MEDICINE

## 2017-09-08 PROCEDURE — 80053 COMPREHEN METABOLIC PANEL: CPT

## 2017-09-08 PROCEDURE — 96367 TX/PROPH/DG ADDL SEQ IV INF: CPT

## 2017-09-08 PROCEDURE — 96365 THER/PROPH/DIAG IV INF INIT: CPT

## 2017-09-08 PROCEDURE — 80320 DRUG SCREEN QUANTALCOHOLS: CPT

## 2017-09-08 PROCEDURE — 82150 ASSAY OF AMYLASE: CPT

## 2017-09-08 PROCEDURE — 63600175 PHARM REV CODE 636 W HCPCS: Performed by: EMERGENCY MEDICINE

## 2017-09-08 RX ORDER — OXAZEPAM 15 MG/1
30 CAPSULE ORAL 4 TIMES DAILY
Qty: 63 CAPSULE | Refills: 0 | Status: SHIPPED | OUTPATIENT
Start: 2017-09-08 | End: 2018-05-10

## 2017-09-08 RX ORDER — SERTRALINE HYDROCHLORIDE 50 MG/1
50 TABLET, FILM COATED ORAL DAILY
Qty: 30 TABLET | Refills: 0 | Status: SHIPPED | OUTPATIENT
Start: 2017-09-08 | End: 2018-05-10 | Stop reason: ALTCHOICE

## 2017-09-08 RX ORDER — SERTRALINE HYDROCHLORIDE 25 MG/1
50 TABLET, FILM COATED ORAL DAILY
Qty: 60 TABLET | Refills: 0 | Status: SHIPPED | OUTPATIENT
Start: 2017-09-08 | End: 2017-09-08 | Stop reason: CLARIF

## 2017-09-08 RX ADMIN — PROMETHAZINE HYDROCHLORIDE 12.5 MG: 25 INJECTION INTRAMUSCULAR; INTRAVENOUS at 09:09

## 2017-09-08 RX ADMIN — LORAZEPAM 1 MG: 2 INJECTION INTRAMUSCULAR; INTRAVENOUS at 09:09

## 2017-09-08 RX ADMIN — SODIUM CHLORIDE 1000 ML: 0.9 INJECTION, SOLUTION INTRAVENOUS at 09:09

## 2017-09-08 RX ADMIN — ASCORBIC ACID, VITAMIN A PALMITATE, CHOLECALCIFEROL, THIAMINE HYDROCHLORIDE, RIBOFLAVIN-5 PHOSPHATE SODIUM, PYRIDOXINE HYDROCHLORIDE, NIACINAMIDE, DEXPANTHENOL, ALPHA-TOCOPHEROL ACETATE, VITAMIN K1, FOLIC ACID, BIOTIN, CYANOCOBALAMIN: 200; 3300; 200; 6; 3.6; 6; 40; 15; 10; 150; 600; 60; 5 INJECTION, SOLUTION INTRAVENOUS at 09:09

## 2017-09-08 RX ADMIN — THIAMINE HYDROCHLORIDE 100 MG: 100 INJECTION, SOLUTION INTRAMUSCULAR; INTRAVENOUS at 09:09

## 2017-09-08 RX ADMIN — FOLIC ACID 1 MG: 5 INJECTION, SOLUTION INTRAMUSCULAR; INTRAVENOUS; SUBCUTANEOUS at 09:09

## 2017-09-08 NOTE — ED PROVIDER NOTES
"Encounter Date: 9/8/2017       History     Chief Complaint   Patient presents with    Dehydration     "I'm here for the same thing I'm always here for. You have my records"     The history is provided by the spouse.   Emesis    This is a recurrent problem. The current episode started today. The problem occurs 5 - 10 times per day. The problem has been unchanged. The emesis has an appearance of stomach contents. Pertinent negatives include no chills, no diarrhea and no fever. Risk factors: alcohol consumption.     Review of patient's allergies indicates:  No Known Allergies  Past Medical History:   Diagnosis Date    Alcohol abuse     Anxiety      No past surgical history on file.  No family history on file.  Social History   Substance Use Topics    Smoking status: Current Every Day Smoker     Packs/day: 2.00     Types: Cigarettes    Smokeless tobacco: Not on file    Alcohol use Yes      Comment: daily- fifth     Review of Systems   Constitutional: Negative for chills and fever.   HENT: Negative for sore throat.    Respiratory: Negative for shortness of breath.    Cardiovascular: Negative for chest pain.   Gastrointestinal: Positive for vomiting. Negative for diarrhea and nausea.   Genitourinary: Negative for dysuria.   Musculoskeletal: Negative for back pain.   Skin: Negative for rash.   Neurological: Negative for weakness.   Hematological: Does not bruise/bleed easily.       Physical Exam     Initial Vitals [09/08/17 0913]   BP Pulse Resp Temp SpO2   121/71 101 20 97.7 °F (36.5 °C) 97 %      MAP       87.67         Physical Exam    Nursing note and vitals reviewed.  Constitutional: He appears well-developed and well-nourished. Distressed: actively vomiting.   HENT:   Head: Normocephalic and atraumatic.   Mouth/Throat: Oropharynx is clear and moist.   Eyes: Conjunctivae and EOM are normal. Pupils are equal, round, and reactive to light.   Neck: Normal range of motion. Neck supple.   Cardiovascular: Regular " "rhythm and normal heart sounds. Tachycardia present.  Exam reveals no gallop and no friction rub.    No murmur heard.  Pulmonary/Chest: Breath sounds normal. No respiratory distress. He has no wheezes. He has no rhonchi. He has no rales.   Abdominal: Soft. Bowel sounds are normal. He exhibits no distension and no mass. There is no tenderness. There is no rebound and no guarding.   Musculoskeletal: Normal range of motion. He exhibits no edema.   Neurological: He is alert and oriented to person, place, and time. He has normal strength.   Skin: Skin is warm and dry. No rash noted.   Psychiatric: He has a normal mood and affect. Thought content normal.         ED Course   Procedures  Labs Reviewed   CBC W/ AUTO DIFFERENTIAL - Abnormal; Notable for the following:        Result Value    MCH 32.8 (*)     MCHC 36.3 (*)     MPV 8.4 (*)     All other components within normal limits   COMPREHENSIVE METABOLIC PANEL - Abnormal; Notable for the following:     CO2 19 (*)     Total Protein 8.8 (*)     Anion Gap 23 (*)     All other components within normal limits   AMYLASE - Abnormal; Notable for the following:     Amylase 120 (*)     All other components within normal limits   ALCOHOL,MEDICAL (ETHANOL) - Abnormal; Notable for the following:     Alcohol, Medical, Serum 131 (*)     All other components within normal limits   LIPASE   ALCOHOL,MEDICAL (ETHANOL)   URINALYSIS   DRUG SCREEN PANEL, URINE EMERGENCY        ED Vital Signs:  Vitals:    09/08/17 0913   BP: 121/71   Pulse: 101   Resp: 20   Temp: 97.7 °F (36.5 °C)   TempSrc: Oral   SpO2: 97%   Weight: 81.6 kg (180 lb)   Height: 5' 9" (1.753 m)         Abnormal Lab Results:  Labs Reviewed   CBC W/ AUTO DIFFERENTIAL - Abnormal; Notable for the following:        Result Value    MCH 32.8 (*)     MCHC 36.3 (*)     MPV 8.4 (*)     All other components within normal limits   COMPREHENSIVE METABOLIC PANEL - Abnormal; Notable for the following:     CO2 19 (*)     Total Protein 8.8 (*)  "    Anion Gap 23 (*)     All other components within normal limits   AMYLASE - Abnormal; Notable for the following:     Amylase 120 (*)     All other components within normal limits   ALCOHOL,MEDICAL (ETHANOL) - Abnormal; Notable for the following:     Alcohol, Medical, Serum 131 (*)     All other components within normal limits   LIPASE   ALCOHOL,MEDICAL (ETHANOL)   URINALYSIS   DRUG SCREEN PANEL, URINE EMERGENCY          All Lab Results:  Results for orders placed or performed during the hospital encounter of 09/08/17   CBC auto differential   Result Value Ref Range    WBC 7.72 3.90 - 12.70 K/uL    RBC 4.69 4.60 - 6.20 M/uL    Hemoglobin 15.4 14.0 - 18.0 g/dL    Hematocrit 42.4 40.0 - 54.0 %    MCV 90 82 - 98 fL    MCH 32.8 (H) 27.0 - 31.0 pg    MCHC 36.3 (H) 32.0 - 36.0 g/dL    RDW 13.8 11.5 - 14.5 %    Platelets 229 150 - 350 K/uL    MPV 8.4 (L) 9.2 - 12.9 fL    Gran # 4.8 1.8 - 7.7 K/uL    Lymph # 2.4 1.0 - 4.8 K/uL    Mono # 0.4 0.3 - 1.0 K/uL    Eos # 0.0 0.0 - 0.5 K/uL    Baso # 0.04 0.00 - 0.20 K/uL    Gran% 62.4 38.0 - 73.0 %    Lymph% 31.1 18.0 - 48.0 %    Mono% 5.4 4.0 - 15.0 %    Eosinophil% 0.3 0.0 - 8.0 %    Basophil% 0.5 0.0 - 1.9 %    Differential Method Automated    Comprehensive metabolic panel   Result Value Ref Range    Sodium 139 136 - 145 mmol/L    Potassium 4.1 3.5 - 5.1 mmol/L    Chloride 97 95 - 110 mmol/L    CO2 19 (L) 23 - 29 mmol/L    Glucose 101 70 - 110 mg/dL    BUN, Bld 12 6 - 20 mg/dL    Creatinine 1.0 0.5 - 1.4 mg/dL    Calcium 9.8 8.7 - 10.5 mg/dL    Total Protein 8.8 (H) 6.0 - 8.4 g/dL    Albumin 4.3 3.5 - 5.2 g/dL    Total Bilirubin 1.0 0.1 - 1.0 mg/dL    Alkaline Phosphatase 97 55 - 135 U/L    AST 39 10 - 40 U/L    ALT 32 10 - 44 U/L    Anion Gap 23 (H) 8 - 16 mmol/L    eGFR if African American >60.0 >60 mL/min/1.73 m^2    eGFR if non African American >60.0 >60 mL/min/1.73 m^2   Lipase   Result Value Ref Range    Lipase 27 4 - 60 U/L   Amylase   Result Value Ref Range    Amylase  120 (H) 20 - 110 U/L   Ethanol   Result Value Ref Range    Alcohol, Medical, Serum 131 (H) <10 mg/dL           Imaging Results:  Imaging Results    None            The Emergency Provider reviewed the vital signs and test results, which are outlined above.    ED Discussions:  11:06 AM: Reassessed pt at this time.  Pt states his condition has improved at this time. Discussed with pt all pertinent ED information and results. Discussed pt dx of alcohol abuse and plan of tx. Gave pt all f/u and return to the ED instructions. All questions and concerns were addressed at this time. Pt expresses understanding of information and instructions, and is comfortable with plan to discharge. Pt is stable for discharge.                                 ED Course      Clinical Impression:       ICD-10-CM ICD-9-CM   1. Alcohol abuse F10.10 305.00   2. Vomiting, intractability of vomiting not specified, presence of nausea not specified, unspecified vomiting type R11.10 787.03         Disposition:   Disposition: Discharged  Condition: Stable                        Abner Curtis MD  09/08/17 1103

## 2017-09-08 NOTE — TELEPHONE ENCOUNTER
----- Message from Landy Vazquez sent at 9/8/2017  4:12 PM CDT -----  Contact: alec-  Would like to have a referal for a psychiatrist. Please call back at [phone].        Thanks,  Landy Vazquez

## 2017-09-08 NOTE — TELEPHONE ENCOUNTER
Spoke with pts Ex Wife Mrs. Sosa. She wants to get the pt a referral to a Psychiatrist in the [facility]. She said it needs to be an older woman or a knowledgeable man that will listen to him and tell him what route he needs to take. That the pt is an alcoholic and has been to the ER a dozen times because of this. That he just got out of the ER and is still drinking. That he went to rehab but started drinking 12 days ago. He is very ill and has not been going to work and is about to get fired from his job over this. She wants him to get well that she is worried he will end up killing him self with the alcohol. Please advise.

## 2017-09-09 NOTE — ED NOTES
Ativan 1mg wasted with IGOR Lindsey. Pt not longer in [employer] directory, unable to waste in [employer].

## 2017-09-12 ENCOUNTER — TELEPHONE (OUTPATIENT)
Dept: INTERNAL MEDICINE | Facility: CLINIC | Age: 62
End: 2017-09-12

## 2017-09-12 DIAGNOSIS — F10.10 ALCOHOL ABUSE: Primary | ICD-10-CM

## 2017-09-12 NOTE — TELEPHONE ENCOUNTER
Pt does not have scheduled appt as indicted by ex wife. Attempt # 2 to contact pt tp schedule no answer no voice message set up.

## 2017-09-12 NOTE — TELEPHONE ENCOUNTER
Nurse attempt to contact pt to follow up on ER visit.  Nurse did not get any answer and was unable to speak with pt. Voice message left for ex wife to return nurse call in regards to speaking with Mr. Day. Pt ex wife stated to Galilea MANNING that   pt has had several visits to ER for excessive  drinking and depression. Ex wife also stated to Galilea  pt has not been going to work due to issues.  Ex wife is very concerned about pt and request a referral to be elevated by physiatrist. Please review and advise.

## 2017-09-12 NOTE — TELEPHONE ENCOUNTER
Patient likely needs to go back to rehab. I will refer to psychiatry but this is not likely an adequate solution. I could prescribe a medication that will discourage alcohol, but he needs to come for appointment so we can discuss the use of this medication.

## 2017-09-14 ENCOUNTER — OUTPATIENT CASE MANAGEMENT (OUTPATIENT)
Dept: ADMINISTRATIVE | Facility: OTHER | Age: 62
End: 2017-09-14

## 2017-09-14 NOTE — PROGRESS NOTES
Please note the following patients information has been forwarded to [facility] for Case Management or .    Please see the media section in patient's chart for additional details.    Please contact Outpatient Complex care Management at ext [phone] with any questions.    Thank you,    Samra Rhoades, SSC

## 2017-09-25 ENCOUNTER — HOSPITAL ENCOUNTER (EMERGENCY)
Facility: HOSPITAL | Age: 62
Discharge: HOME | End: 2017-09-25
Attending: EMERGENCY MEDICINE
Payer: COMMERCIAL

## 2017-09-25 VITALS
RESPIRATION RATE: 19 BRPM | WEIGHT: 181 LBS | DIASTOLIC BLOOD PRESSURE: 69 MMHG | HEIGHT: 69 IN | HEART RATE: 73 BPM | OXYGEN SATURATION: 95 % | SYSTOLIC BLOOD PRESSURE: 118 MMHG | TEMPERATURE: 99 F | BODY MASS INDEX: 26.81 KG/M2

## 2017-09-25 DIAGNOSIS — F10.920 ALCOHOL INTOXICATION, UNCOMPLICATED: Primary | ICD-10-CM

## 2017-09-25 DIAGNOSIS — R05.9 COUGH: ICD-10-CM

## 2017-09-25 DIAGNOSIS — R11.2 NON-INTRACTABLE VOMITING WITH NAUSEA, UNSPECIFIED VOMITING TYPE: ICD-10-CM

## 2017-09-25 LAB
ALBUMIN SERPL BCP-MCNC: 4.4 G/DL
ALP SERPL-CCNC: 100 U/L
ALT SERPL W/O P-5'-P-CCNC: 75 U/L
AMPHET+METHAMPHET UR QL: NEGATIVE
ANION GAP SERPL CALC-SCNC: 22 MMOL/L
AST SERPL-CCNC: 112 U/L
BACTERIA #/AREA URNS AUTO: ABNORMAL /HPF
BARBITURATES UR QL SCN>200 NG/ML: NEGATIVE
BASOPHILS # BLD AUTO: 0.03 K/UL
BASOPHILS NFR BLD: 0.7 %
BENZODIAZ UR QL SCN>200 NG/ML: NORMAL
BILIRUB SERPL-MCNC: 1.3 MG/DL
BILIRUB UR QL STRIP: NEGATIVE
BUN SERPL-MCNC: 9 MG/DL
BZE UR QL SCN: NEGATIVE
CALCIUM SERPL-MCNC: 9.3 MG/DL
CANNABINOIDS UR QL SCN: NEGATIVE
CHLORIDE SERPL-SCNC: 98 MMOL/L
CK SERPL-CCNC: 252 U/L
CLARITY UR REFRACT.AUTO: CLEAR
CO2 SERPL-SCNC: 21 MMOL/L
COLOR UR AUTO: ABNORMAL
CREAT SERPL-MCNC: 0.9 MG/DL
CREAT UR-MCNC: 282.4 MG/DL
DIFFERENTIAL METHOD: ABNORMAL
EOSINOPHIL # BLD AUTO: 0 K/UL
EOSINOPHIL NFR BLD: 0 %
ERYTHROCYTE [DISTWIDTH] IN BLOOD BY AUTOMATED COUNT: 17.3 %
EST. GFR  (AFRICAN AMERICAN): >60 ML/MIN/1.73 M^2
EST. GFR  (NON AFRICAN AMERICAN): >60 ML/MIN/1.73 M^2
ETHANOL SERPL-MCNC: 115 MG/DL
GLUCOSE SERPL-MCNC: 111 MG/DL
GLUCOSE UR QL STRIP: ABNORMAL
HCT VFR BLD AUTO: 44.7 %
HGB BLD-MCNC: 15.8 G/DL
HGB UR QL STRIP: ABNORMAL
HYALINE CASTS UR QL AUTO: 40 /LPF
KETONES UR QL STRIP: NEGATIVE
LEUKOCYTE ESTERASE UR QL STRIP: NEGATIVE
LIPASE SERPL-CCNC: 30 U/L
LYMPHOCYTES # BLD AUTO: 1.1 K/UL
LYMPHOCYTES NFR BLD: 25.7 %
MCH RBC QN AUTO: 33.8 PG
MCHC RBC AUTO-ENTMCNC: 35.3 G/DL
MCV RBC AUTO: 96 FL
METHADONE UR QL SCN>300 NG/ML: NEGATIVE
MICROSCOPIC COMMENT: ABNORMAL
MONOCYTES # BLD AUTO: 0.3 K/UL
MONOCYTES NFR BLD: 7.1 %
NEUTROPHILS # BLD AUTO: 2.8 K/UL
NEUTROPHILS NFR BLD: 66.3 %
NITRITE UR QL STRIP: NEGATIVE
OPIATES UR QL SCN: NEGATIVE
OTHER ELEMENTS URNS MICRO: ABNORMAL
PCP UR QL SCN>25 NG/ML: NEGATIVE
PH UR STRIP: 7 [PH] (ref 5–8)
PLATELET # BLD AUTO: 135 K/UL
PMV BLD AUTO: 8.9 FL
POTASSIUM SERPL-SCNC: 4.1 MMOL/L
PROT SERPL-MCNC: 8.4 G/DL
PROT UR QL STRIP: ABNORMAL
RBC # BLD AUTO: 4.67 M/UL
RBC #/AREA URNS AUTO: 3 /HPF (ref 0–4)
SODIUM SERPL-SCNC: 141 MMOL/L
SP GR UR STRIP: >=1.03 (ref 1–1.03)
TOXICOLOGY INFORMATION: NORMAL
URN SPEC COLLECT METH UR: ABNORMAL
UROBILINOGEN UR STRIP-ACNC: <2 EU/DL
WBC # BLD AUTO: 4.2 K/UL
WBC #/AREA URNS AUTO: 4 /HPF (ref 0–5)

## 2017-09-25 PROCEDURE — 63600175 PHARM REV CODE 636 W HCPCS: Performed by: EMERGENCY MEDICINE

## 2017-09-25 PROCEDURE — 85025 COMPLETE CBC W/AUTO DIFF WBC: CPT

## 2017-09-25 PROCEDURE — 99284 EMERGENCY DEPT VISIT MOD MDM: CPT | Mod: 25

## 2017-09-25 PROCEDURE — 80320 DRUG SCREEN QUANTALCOHOLS: CPT

## 2017-09-25 PROCEDURE — 82550 ASSAY OF CK (CPK): CPT

## 2017-09-25 PROCEDURE — 96375 TX/PRO/DX INJ NEW DRUG ADDON: CPT

## 2017-09-25 PROCEDURE — 83690 ASSAY OF LIPASE: CPT

## 2017-09-25 PROCEDURE — 81000 URINALYSIS NONAUTO W/SCOPE: CPT

## 2017-09-25 PROCEDURE — 96361 HYDRATE IV INFUSION ADD-ON: CPT

## 2017-09-25 PROCEDURE — 80307 DRUG TEST PRSMV CHEM ANLYZR: CPT

## 2017-09-25 PROCEDURE — 96368 THER/DIAG CONCURRENT INF: CPT

## 2017-09-25 PROCEDURE — 25000003 PHARM REV CODE 250: Performed by: EMERGENCY MEDICINE

## 2017-09-25 PROCEDURE — 96365 THER/PROPH/DIAG IV INF INIT: CPT

## 2017-09-25 PROCEDURE — 80053 COMPREHEN METABOLIC PANEL: CPT

## 2017-09-25 RX ORDER — ONDANSETRON 2 MG/ML
4 INJECTION INTRAMUSCULAR; INTRAVENOUS
Status: COMPLETED | OUTPATIENT
Start: 2017-09-25 | End: 2017-09-25

## 2017-09-25 RX ORDER — ONDANSETRON 4 MG/1
4 TABLET, FILM COATED ORAL EVERY 8 HOURS PRN
Qty: 12 TABLET | Refills: 0 | Status: SHIPPED | OUTPATIENT
Start: 2017-09-25 | End: 2017-10-03 | Stop reason: SDUPTHER

## 2017-09-25 RX ORDER — LORAZEPAM 2 MG/ML
INJECTION INTRAMUSCULAR
Status: DISCONTINUED
Start: 2017-09-25 | End: 2017-09-25

## 2017-09-25 RX ADMIN — ONDANSETRON 4 MG: 2 INJECTION INTRAMUSCULAR; INTRAVENOUS at 08:09

## 2017-09-25 RX ADMIN — FOLIC ACID 1 MG: 5 INJECTION, SOLUTION INTRAMUSCULAR; INTRAVENOUS; SUBCUTANEOUS at 08:09

## 2017-09-25 RX ADMIN — SODIUM CHLORIDE 1000 ML: 0.9 INJECTION, SOLUTION INTRAVENOUS at 08:09

## 2017-09-25 RX ADMIN — LORAZEPAM: 2 INJECTION, SOLUTION INTRAMUSCULAR; INTRAVENOUS at 10:09

## 2017-09-25 RX ADMIN — ASCORBIC ACID, VITAMIN A PALMITATE, CHOLECALCIFEROL, THIAMINE HYDROCHLORIDE, RIBOFLAVIN-5 PHOSPHATE SODIUM, PYRIDOXINE HYDROCHLORIDE, NIACINAMIDE, DEXPANTHENOL, ALPHA-TOCOPHEROL ACETATE, VITAMIN K1, FOLIC ACID, BIOTIN, CYANOCOBALAMIN: 200; 3300; 200; 6; 3.6; 6; 40; 15; 10; 150; 600; 60; 5 INJECTION, SOLUTION INTRAVENOUS at 08:09

## 2017-09-25 RX ADMIN — THIAMINE HYDROCHLORIDE 100 MG: 100 INJECTION, SOLUTION INTRAMUSCULAR; INTRAVENOUS at 08:09

## 2017-09-26 NOTE — ED PROVIDER NOTES
"Encounter Date: 9/25/2017       History     Chief Complaint   Patient presents with    Emesis     Pt states, "I'm dehydrated from drinking and I'm shaking." reports drinking approx 1 fifth of whisky daily.      The history is provided by the patient.   Emesis    This is a recurrent problem. The current episode started today (after drinking several pints of "hard liquor"). The problem occurs 2 - 4 times per day. The problem has been unchanged. The emesis has an appearance of stomach contents. Pertinent negatives include no abdominal pain, no arthralgias, no chills, no cough, no diarrhea, no fever, no headaches, no myalgias, no sweats and no URI. Risk factors: pt states he drinks etoh chronicly, but unable to stop retching today.     Review of patient's allergies indicates:  No Known Allergies  Past Medical History:   Diagnosis Date    Alcohol abuse     Anxiety      History reviewed. No pertinent surgical history.  History reviewed. No pertinent family history.  Social History   Substance Use Topics    Smoking status: Current Every Day Smoker     Packs/day: 2.00     Types: Cigarettes    Smokeless tobacco: Never Used    Alcohol use Yes      Comment: daily- fifth of whiskey     Review of Systems   Constitutional: Negative for chills and fever.   HENT: Negative for sore throat.    Respiratory: Negative for cough and shortness of breath.    Cardiovascular: Negative for chest pain.   Gastrointestinal: Positive for vomiting. Negative for abdominal pain, diarrhea and nausea.   Genitourinary: Negative for dysuria.   Musculoskeletal: Negative for arthralgias, back pain and myalgias.   Skin: Negative for rash.   Neurological: Negative for weakness and headaches.   Hematological: Does not bruise/bleed easily.   All other systems reviewed and are negative.      Physical Exam     Initial Vitals [09/25/17 2014]   BP Pulse Resp Temp SpO2   122/67 98 18 98.5 °F (36.9 °C) 97 %      MAP       85.33         Physical " Exam    Nursing note and vitals reviewed.  Constitutional: He appears well-developed and well-nourished. He is not diaphoretic. No distress.   HENT:   Head: Normocephalic and atraumatic.   Eyes: EOM are normal. Pupils are equal, round, and reactive to light. No scleral icterus.   Neck: Normal range of motion. Neck supple. No thyromegaly present.   Cardiovascular: Normal rate, regular rhythm, normal heart sounds and intact distal pulses. Exam reveals no gallop and no friction rub.    No murmur heard.  Pulmonary/Chest: Breath sounds normal. No respiratory distress. He has no wheezes. He has no rhonchi. He exhibits no tenderness.   Abdominal: Soft. Bowel sounds are normal. He exhibits no distension. There is no tenderness. There is no rebound and no guarding.   Musculoskeletal: Normal range of motion. He exhibits no edema or tenderness.   Lymphadenopathy:     He has no cervical adenopathy.   Neurological: He is alert and oriented to person, place, and time. He has normal strength. No cranial nerve deficit or sensory deficit. GCS eye subscore is 4. GCS verbal subscore is 5. GCS motor subscore is 6.   Mild asterixis noted bilaterally.  Pt states he always shakes.     Skin: Skin is warm and dry.   Psychiatric: He has a normal mood and affect. His behavior is normal. Judgment and thought content normal.         ED Course   Procedures  Labs Reviewed   CBC W/ AUTO DIFFERENTIAL - Abnormal; Notable for the following:        Result Value    MCH 33.8 (*)     RDW 17.3 (*)     Platelets 135 (*)     MPV 8.9 (*)     All other components within normal limits   COMPREHENSIVE METABOLIC PANEL - Abnormal; Notable for the following:     CO2 21 (*)     Glucose 111 (*)     Total Bilirubin 1.3 (*)      (*)     ALT 75 (*)     Anion Gap 22 (*)     All other components within normal limits   URINALYSIS - Abnormal; Notable for the following:     Color, UA Red (*)     Specific Gravity, UA >=1.030 (*)     Protein, UA 2+ (*)     Glucose, UA  "Trace (*)     Occult Blood UA Trace (*)     All other components within normal limits   ALCOHOL,MEDICAL (ETHANOL) - Abnormal; Notable for the following:     Alcohol, Medical, Serum 115 (*)     All other components within normal limits   URINALYSIS MICROSCOPIC - Abnormal; Notable for the following:     Hyaline Casts, UA 40 (*)     Other (U/A) Moderate (*)     All other components within normal limits   CK - Abnormal; Notable for the following:      (*)     All other components within normal limits   LIPASE   DRUG SCREEN PANEL, URINE EMERGENCY   CK          Vitals:    09/25/17 2014 09/25/17 2043 09/25/17 2117 09/25/17 2132   BP: 122/67 137/78 123/84 (!) 165/69   Pulse: 98 77 90 102   Resp: 18 10 20 18   Temp: 98.5 °F (36.9 °C)      TempSrc: Oral      SpO2: 97% 97% 99% (!) 94%   Weight: 82.1 kg (181 lb)      Height: 5' 9" (1.753 m)       09/25/17 2147 09/25/17 2202 09/25/17 2350   BP: (!) 153/71 (!) 147/73 118/69   Pulse: 85 80 73   Resp: 15 13 19   Temp:      TempSrc:      SpO2: 95% (!) 93% 95%   Weight:      Height:          Results for orders placed or performed during the hospital encounter of 09/25/17   CBC W/ AUTO DIFFERENTIAL   Result Value Ref Range    WBC 4.20 3.90 - 12.70 K/uL    RBC 4.67 4.60 - 6.20 M/uL    Hemoglobin 15.8 14.0 - 18.0 g/dL    Hematocrit 44.7 40.0 - 54.0 %    MCV 96 82 - 98 fL    MCH 33.8 (H) 27.0 - 31.0 pg    MCHC 35.3 32.0 - 36.0 g/dL    RDW 17.3 (H) 11.5 - 14.5 %    Platelets 135 (L) 150 - 350 K/uL    MPV 8.9 (L) 9.2 - 12.9 fL    Gran # 2.8 1.8 - 7.7 K/uL    Lymph # 1.1 1.0 - 4.8 K/uL    Mono # 0.3 0.3 - 1.0 K/uL    Eos # 0.0 0.0 - 0.5 K/uL    Baso # 0.03 0.00 - 0.20 K/uL    Gran% 66.3 38.0 - 73.0 %    Lymph% 25.7 18.0 - 48.0 %    Mono% 7.1 4.0 - 15.0 %    Eosinophil% 0.0 0.0 - 8.0 %    Basophil% 0.7 0.0 - 1.9 %    Differential Method Automated    Comp. Metabolic Panel   Result Value Ref Range    Sodium 141 136 - 145 mmol/L    Potassium 4.1 3.5 - 5.1 mmol/L    Chloride 98 95 - 110 " mmol/L    CO2 21 (L) 23 - 29 mmol/L    Glucose 111 (H) 70 - 110 mg/dL    BUN, Bld 9 6 - 20 mg/dL    Creatinine 0.9 0.5 - 1.4 mg/dL    Calcium 9.3 8.7 - 10.5 mg/dL    Total Protein 8.4 6.0 - 8.4 g/dL    Albumin 4.4 3.5 - 5.2 g/dL    Total Bilirubin 1.3 (H) 0.1 - 1.0 mg/dL    Alkaline Phosphatase 100 55 - 135 U/L     (H) 10 - 40 U/L    ALT 75 (H) 10 - 44 U/L    Anion Gap 22 (H) 8 - 16 mmol/L    eGFR if African American >60.0 >60 mL/min/1.73 m^2    eGFR if non African American >60.0 >60 mL/min/1.73 m^2   Lipase   Result Value Ref Range    Lipase 30 4 - 60 U/L   Urinalysis - Clean Catch   Result Value Ref Range    Specimen UA Urine, Clean Catch     Color, UA Red (A) Yellow, Straw, Daily    Appearance, UA Clear Clear    pH, UA 7.0 5.0 - 8.0    Specific Gravity, UA >=1.030 (A) 1.005 - 1.030    Protein, UA 2+ (A) Negative    Glucose, UA Trace (A) Negative    Ketones, UA Negative Negative    Bilirubin (UA) Negative Negative    Occult Blood UA Trace (A) Negative    Nitrite, UA Negative Negative    Urobilinogen, UA <2.0 <2.0 EU/dL    Leukocytes, UA Negative Negative   Ethanol   Result Value Ref Range    Alcohol, Medical, Serum 115 (H) <10 mg/dL   Drug screen panel, emergency   Result Value Ref Range    Benzodiazepines Presumptve Positive     Methadone metabolites Negative     Cocaine (Metab.) Negative     Opiate Scrn, Ur Negative     Barbiturate Screen, Ur Negative     Amphetamine Screen, Ur Negative     THC Negative     Phencyclidine Negative     Creatinine, Random Ur 282.4 23.0 - 375.0 mg/dL    Toxicology Information SEE COMMENT    Urinalysis Microscopic   Result Value Ref Range    RBC, UA 3 0 - 4 /hpf    WBC, UA 4 0 - 5 /hpf    Bacteria, UA Occasional None-Occ /hpf    Hyaline Casts, UA 40 (A) 0-1/lpf /lpf    Other (U/A) Moderate (A) None    Microscopic Comment mucous    CK   Result Value Ref Range     (H) 20 - 200 U/L         Imaging Results          X-Ray Chest 1 View (Final result)  Result time 09/25/17  21:50:58    Final result by Boris Yan MD (09/25/17 21:50:58)                 Impression:     No active infiltrates      Electronically signed by: BORIS YAN MD  Date:     09/25/17  Time:    21:50              Narrative:    Portable chest    Clinical indication: Cough    Findings: The heart is normal in size.  There some linear line loss of the left lung base the lungs elsewhere are clear.  No active infiltrates.  There has been little significant change in the prior study of 04/23/2017                             X-Ray Abdomen Flat And Erect (Edited Result - FINAL)  Result time 09/25/17 21:09:45    Addendum 1 of 1 by Boris Yan MD (09/25/17 21:09:45)    Addendum: The impression should read no sign of bowel obstruction.      Electronically signed by: BORIS YAN MD  Date:     09/25/17  Time:    21:09                Final result by Boris Yan MD (09/25/17 21:08:42)                 Impression:     Nonspecific bowel gas pattern.  No sign of bowel      Electronically signed by: BORIS YAN MD  Date:     09/25/17  Time:    21:08              Narrative:    Flat and erect of the abdomen    Clinical indication: Generalized abdominal pain    Findings: None of the bowel loops are dilated.  No sign of bowel obstruction or free air.  There is very little bowel gas present.  No abnormal calcifications.  There is a scoliosis of the lumbar spine convex toward the right.                              Medications   sodium chloride 0.9% bolus 1,000 mL (0 mLs Intravenous Stopped 9/25/17 2058)   ondansetron injection 4 mg (4 mg Intravenous Given 9/25/17 2030)   sodium chloride 0.9% 1,000 mL with mvi, adult no.4 with vit K 3,300 unit- 150 mcg/10 mL 10 mL infusion ( Intravenous Stopped 9/25/17 2355)     And   folic acid 1 mg in sodium chloride 0.9% 100 mL IVPB (0 mg Intravenous Stopped 9/25/17 2229)     And   thiamine (B-1) 100 mg in dextrose 5 % 50 mL IVPB (0 mg Intravenous Stopped 9/25/17 2229)    lorazepam (ATIVAN) injection 1 mg ( Intravenous Given 9/25/17 6497)         9:37 PM - Re-evaluation: The patient is resting comfortably and is in no acute distress.  Advised pt to stop excess etoh use and to f/u c pcp. He states that his symptoms have improved after treatment within ER. Discussed test results, shared treatment plan, specific conditions for return, and importance of follow up with patient and family.  Pt able to ambulate on his own with out assistance and not slurring speech.  Gcs=15.  He understands and agrees with the plan as discussed. Answered  his questions at this time. He has remained hemodynamically stable throughout the ED course and is appropriate for discharge home.     Regarding VOMITING, I advised patient on importance of staying well hydrated; eating frequent, small amounts of clear liquids; avoid solid foods until there has been no vomiting for six hours, and then work slowly back to a normal diet; and to use an OTC bismuth stomach remedy for upset stomach, nausea, indigestion, and diarrhea. I discussed signs and symptoms of dehydration and possible causes of nausea and vomiting including viral infections, medications, migraine headaches, food poisoning, allergies, and peptic ulcer disease. Instructed patient to take medications as prescribed and to follow up with primary care provider or return to ER if condition worsens.     Regarding DEHYDRATION, advised patient to call 911 if they should experience confusion, dizziness, lethargy, and/or lightheadedness.  The patient was instructed to contact their primary care provider immediately or return to the ED for any of the following symptoms: blood in the stool or vomit; diarrhea or vomiting for an extended period of time (vomiting > 24 hours or diarrhea for > 3 days); dry mouth or dry eyes; poor skin turgor; listlessness and inactiveness; tachycardia; little or no urine output for 8 hours; inability to keep fluids down; and sunken eyes.   The patient was encouraged to drink plenty of water daily and to increase water intake when weather is hot or during exercise.    Pre-hypertension/Hypertension: The pt has been informed that they may have pre-hypertension or hypertension based on a blood pressure reading in the ED. I recommend that the pt call the PCP listed on their discharge instructions or a physician of their choice this week to arrange f/u for further evaluation of possible pre-hypertension or hypertension.     Barb Carson was given a handout which discussed their disease process, precautions, and instructions for follow-up and therapy.    Follow-up Information     Tony Paul DO. Schedule an appointment as soon as possible for a visit in 1 week.    Specialty:  Family Medicine  Contact information:  [address] [address] [id number] [phone]             [facility].    Specialty:  Emergency Medicine  Why:  As needed, If symptoms worsen  Contact information:  [address] [address] [address] [phone]                 Discharge Medication List as of 9/25/2017 11:37 PM      START taking these medications    Details   ondansetron (ZOFRAN) 4 MG tablet Take 1 tablet (4 mg total) by mouth every 8 (eight) hours as needed., Starting Mon 9/25/2017, Print                ED Diagnosis  1. Alcohol intoxication, uncomplicated    2. Cough    3. Non-intractable vomiting with nausea, unspecified vomiting type                             ED Course      Clinical Impression:   The primary encounter diagnosis was Alcohol intoxication, uncomplicated. Diagnoses of Cough and Non-intractable vomiting with nausea, unspecified vomiting type were also pertinent to this visit.    Disposition:   Disposition: Discharged  Condition: Stable                        Bull Folr Jr., MD  09/29/17 2140       Bull Flor Jr., MD  09/29/17 2143

## 2017-09-26 NOTE — ED NOTES
Dr. Flor at the bedside speaking with patient & family members regarding results of diagnostics & plan of care at this time.

## 2017-09-26 NOTE — DISCHARGE INSTRUCTIONS
Regarding VOMITING, I advised patient on importance of staying well hydrated; eating frequent, small amounts of clear liquids; avoid solid foods until there has been no vomiting for six hours, and then work slowly back to a normal diet; and to use an OTC bismuth stomach remedy for upset stomach, nausea, indigestion, and diarrhea. I discussed signs and symptoms of dehydration and possible causes of nausea and vomiting including viral infections, medications, migraine headaches, food poisoning, allergies, and peptic ulcer disease. Instructed patient to take medications as prescribed and to follow up with primary care provider or return to ER if condition worsens.

## 2017-10-03 NOTE — TELEPHONE ENCOUNTER
Nurse called [facility] pharmacy to verify medication needed. Pharmacist stated no one called in regards to pt needing another medication in place of zofran. Nurse informed per pharmacist  pt received medication refill on zofran on 9/26/17, nurse attempt to contact pt, pt has no voice message set up at this time.

## 2017-10-03 NOTE — TELEPHONE ENCOUNTER
----- Message from Grecia Montoya sent at 10/3/2017  2:00 PM CDT -----  Contact: alec   Please call [facility] pharmacy in [facility] at [phone] for substitution of zophraim.please call pt at [phone] when ready.

## 2017-10-03 NOTE — TELEPHONE ENCOUNTER
----- Message from Reyes Nicolas sent at 10/3/2017  2:09 PM CDT -----  Contact: Sheila Luke (Other)  Sheila Luke (Other) is requesting a refill on the zofran 8 mg.          Please call Sheila Luke (Other)  [phone]          ..  [address] [address] [address]  Phone: [phone] Fax: [phone]

## 2017-10-04 ENCOUNTER — HOSPITAL ENCOUNTER (EMERGENCY)
Facility: HOSPITAL | Age: 62
End: 2017-10-04
Attending: EMERGENCY MEDICINE
Payer: COMMERCIAL

## 2017-10-04 VITALS
WEIGHT: 180 LBS | HEART RATE: 68 BPM | BODY MASS INDEX: 26.66 KG/M2 | RESPIRATION RATE: 18 BRPM | HEIGHT: 69 IN | TEMPERATURE: 98 F | OXYGEN SATURATION: 100 % | SYSTOLIC BLOOD PRESSURE: 143 MMHG | DIASTOLIC BLOOD PRESSURE: 81 MMHG

## 2017-10-04 DIAGNOSIS — Z91.199 MEDICALLY NONCOMPLIANT: ICD-10-CM

## 2017-10-04 DIAGNOSIS — Z72.0 TOBACCO ABUSE: ICD-10-CM

## 2017-10-04 DIAGNOSIS — R79.89 ELEVATED LFTS: ICD-10-CM

## 2017-10-04 DIAGNOSIS — F33.2 SEVERE EPISODE OF RECURRENT MAJOR DEPRESSIVE DISORDER, WITHOUT PSYCHOTIC FEATURES: ICD-10-CM

## 2017-10-04 DIAGNOSIS — R45.851 SUICIDAL IDEATION: Primary | ICD-10-CM

## 2017-10-04 DIAGNOSIS — R03.0 ELEVATED BLOOD PRESSURE READING WITHOUT DIAGNOSIS OF HYPERTENSION: ICD-10-CM

## 2017-10-04 DIAGNOSIS — F10.20 ALCOHOLISM: ICD-10-CM

## 2017-10-04 DIAGNOSIS — F10.930 ALCOHOL WITHDRAWAL SYNDROME WITHOUT COMPLICATION: ICD-10-CM

## 2017-10-04 LAB
ALBUMIN SERPL BCP-MCNC: 4.3 G/DL
ALP SERPL-CCNC: 107 U/L
ALT SERPL W/O P-5'-P-CCNC: 99 U/L
AMPHET+METHAMPHET UR QL: NEGATIVE
ANION GAP SERPL CALC-SCNC: 20 MMOL/L
APAP SERPL-MCNC: 4 UG/ML
AST SERPL-CCNC: 99 U/L
BACTERIA #/AREA URNS HPF: NORMAL /HPF
BARBITURATES UR QL SCN>200 NG/ML: NEGATIVE
BASOPHILS # BLD AUTO: 0.01 K/UL
BASOPHILS NFR BLD: 0.1 %
BENZODIAZ UR QL SCN>200 NG/ML: NEGATIVE
BILIRUB SERPL-MCNC: 2.6 MG/DL
BILIRUB UR QL STRIP: ABNORMAL
BUN SERPL-MCNC: 11 MG/DL
BZE UR QL SCN: NEGATIVE
CALCIUM SERPL-MCNC: 9.8 MG/DL
CANNABINOIDS UR QL SCN: NEGATIVE
CHLORIDE SERPL-SCNC: 93 MMOL/L
CLARITY UR: CLEAR
CO2 SERPL-SCNC: 25 MMOL/L
COLOR UR: YELLOW
CREAT SERPL-MCNC: 1 MG/DL
CREAT UR-MCNC: 279.3 MG/DL
DIFFERENTIAL METHOD: ABNORMAL
EOSINOPHIL # BLD AUTO: 0 K/UL
EOSINOPHIL NFR BLD: 0 %
ERYTHROCYTE [DISTWIDTH] IN BLOOD BY AUTOMATED COUNT: 17.4 %
EST. GFR  (AFRICAN AMERICAN): >60 ML/MIN/1.73 M^2
EST. GFR  (NON AFRICAN AMERICAN): >60 ML/MIN/1.73 M^2
ETHANOL SERPL-MCNC: <10 MG/DL
GLUCOSE SERPL-MCNC: 145 MG/DL
GLUCOSE UR QL STRIP: NEGATIVE
HCT VFR BLD AUTO: 45.8 %
HGB BLD-MCNC: 16.5 G/DL
HGB UR QL STRIP: ABNORMAL
HYALINE CASTS #/AREA URNS LPF: 0 /LPF
KETONES UR QL STRIP: NEGATIVE
LEUKOCYTE ESTERASE UR QL STRIP: NEGATIVE
LYMPHOCYTES # BLD AUTO: 0.7 K/UL
LYMPHOCYTES NFR BLD: 9.2 %
MCH RBC QN AUTO: 34.6 PG
MCHC RBC AUTO-ENTMCNC: 36 G/DL
MCV RBC AUTO: 96 FL
METHADONE UR QL SCN>300 NG/ML: NEGATIVE
MICROSCOPIC COMMENT: NORMAL
MONOCYTES # BLD AUTO: 0.6 K/UL
MONOCYTES NFR BLD: 8.4 %
NEUTROPHILS # BLD AUTO: 5.9 K/UL
NEUTROPHILS NFR BLD: 82.3 %
NITRITE UR QL STRIP: NEGATIVE
OPIATES UR QL SCN: NEGATIVE
PCP UR QL SCN>25 NG/ML: NEGATIVE
PH UR STRIP: 6 [PH] (ref 5–8)
PLATELET # BLD AUTO: 102 K/UL
PMV BLD AUTO: 9.4 FL
POTASSIUM SERPL-SCNC: 3.2 MMOL/L
PROT SERPL-MCNC: 8.6 G/DL
PROT UR QL STRIP: ABNORMAL
RBC # BLD AUTO: 4.77 M/UL
RBC #/AREA URNS HPF: 0 /HPF (ref 0–4)
SALICYLATES SERPL-MCNC: <5 MG/DL
SODIUM SERPL-SCNC: 138 MMOL/L
SP GR UR STRIP: >=1.03 (ref 1–1.03)
TOXICOLOGY INFORMATION: NORMAL
TSH SERPL DL<=0.005 MIU/L-ACNC: 0.42 UIU/ML
URN SPEC COLLECT METH UR: ABNORMAL
UROBILINOGEN UR STRIP-ACNC: ABNORMAL EU/DL
WBC # BLD AUTO: 7.15 K/UL
WBC #/AREA URNS HPF: 0 /HPF (ref 0–5)

## 2017-10-04 PROCEDURE — 63600175 PHARM REV CODE 636 W HCPCS: Performed by: EMERGENCY MEDICINE

## 2017-10-04 PROCEDURE — 80329 ANALGESICS NON-OPIOID 1 OR 2: CPT

## 2017-10-04 PROCEDURE — 85025 COMPLETE CBC W/AUTO DIFF WBC: CPT

## 2017-10-04 PROCEDURE — 93010 ELECTROCARDIOGRAM REPORT: CPT | Mod: ,,, | Performed by: INTERNAL MEDICINE

## 2017-10-04 PROCEDURE — 96375 TX/PRO/DX INJ NEW DRUG ADDON: CPT

## 2017-10-04 PROCEDURE — 80320 DRUG SCREEN QUANTALCOHOLS: CPT

## 2017-10-04 PROCEDURE — 80307 DRUG TEST PRSMV CHEM ANLYZR: CPT

## 2017-10-04 PROCEDURE — 25000003 PHARM REV CODE 250: Performed by: EMERGENCY MEDICINE

## 2017-10-04 PROCEDURE — 96376 TX/PRO/DX INJ SAME DRUG ADON: CPT

## 2017-10-04 PROCEDURE — 96365 THER/PROPH/DIAG IV INF INIT: CPT

## 2017-10-04 PROCEDURE — 93005 ELECTROCARDIOGRAM TRACING: CPT

## 2017-10-04 PROCEDURE — 81000 URINALYSIS NONAUTO W/SCOPE: CPT

## 2017-10-04 PROCEDURE — 80053 COMPREHEN METABOLIC PANEL: CPT

## 2017-10-04 PROCEDURE — 99285 EMERGENCY DEPT VISIT HI MDM: CPT | Mod: 25

## 2017-10-04 PROCEDURE — 84443 ASSAY THYROID STIM HORMONE: CPT

## 2017-10-04 RX ORDER — ONDANSETRON 2 MG/ML
4 INJECTION INTRAMUSCULAR; INTRAVENOUS
Status: COMPLETED | OUTPATIENT
Start: 2017-10-04 | End: 2017-10-04

## 2017-10-04 RX ORDER — DIAZEPAM 10 MG/2ML
5 INJECTION INTRAMUSCULAR
Status: COMPLETED | OUTPATIENT
Start: 2017-10-04 | End: 2017-10-04

## 2017-10-04 RX ORDER — DIAZEPAM 5 MG/1
10 TABLET ORAL EVERY 6 HOURS PRN
Status: DISCONTINUED | OUTPATIENT
Start: 2017-10-04 | End: 2017-10-04 | Stop reason: HOSPADM

## 2017-10-04 RX ORDER — POTASSIUM CHLORIDE 20 MEQ/1
40 TABLET, EXTENDED RELEASE ORAL
Status: COMPLETED | OUTPATIENT
Start: 2017-10-04 | End: 2017-10-04

## 2017-10-04 RX ORDER — ONDANSETRON 4 MG/1
4 TABLET, FILM COATED ORAL EVERY 8 HOURS PRN
Qty: 12 TABLET | Refills: 0 | Status: SHIPPED | OUTPATIENT
Start: 2017-10-04 | End: 2018-05-10

## 2017-10-04 RX ADMIN — DIAZEPAM 10 MG: 5 TABLET ORAL at 10:10

## 2017-10-04 RX ADMIN — FOLIC ACID: 5 INJECTION, SOLUTION INTRAMUSCULAR; INTRAVENOUS; SUBCUTANEOUS at 06:10

## 2017-10-04 RX ADMIN — DIAZEPAM 5 MG: 5 INJECTION, SOLUTION INTRAMUSCULAR; INTRAVENOUS at 06:10

## 2017-10-04 RX ADMIN — DIAZEPAM 5 MG: 5 INJECTION, SOLUTION INTRAMUSCULAR; INTRAVENOUS at 05:10

## 2017-10-04 RX ADMIN — ONDANSETRON 4 MG: 2 INJECTION INTRAMUSCULAR; INTRAVENOUS at 05:10

## 2017-10-04 RX ADMIN — ONDANSETRON 4 MG: 2 INJECTION INTRAMUSCULAR; INTRAVENOUS at 09:10

## 2017-10-04 RX ADMIN — POTASSIUM CHLORIDE 40 MEQ: 1500 TABLET, EXTENDED RELEASE ORAL at 06:10

## 2017-10-04 NOTE — ED NOTES
Patient's rights brochure explained to patient and family member and copy left at bedside.  PEC paperwork completed and placed in chart.

## 2017-10-04 NOTE — ED NOTES
Patient's belongings include only one grey t-shirt and one pair of grey shorts.  Belongings placed in bag and put in locker #2.

## 2017-10-04 NOTE — ED NOTES
Pt's room secured per protocol. Pt's belongings secured and pt placed in grey gown and yellow socks. Pt being directly monitored by cheyenne Pennington at this time. Family member remains at bedside.

## 2017-10-04 NOTE — PROVIDER PROGRESS NOTES - EMERGENCY DEPT.
Encounter Date: 10/4/2017    ED Physician Progress Notes        Physician Note:   9:09 AM: Pt has been accepted at psychiatric facility. Pt has already been medically cleared. Pt resting comfortably and in NAD.  Accepting Facility: [facility]  Accepting Physician: Dr. Pak

## 2017-10-04 NOTE — ED PROVIDER NOTES
"SCRIBE #1 NOTE: I, Lynnette Jan, am scribing for, and in the presence of, Durga Aguilar MD. I have scribed the entire note.      History      Chief Complaint   Patient presents with    Withdrawal     Pt reports alcoholic, drinks approx 1 fifth whiskey per day, has not had drink in approx 24hrs. +N/V, fatigue. +SI, denies plan.       Review of patient's allergies indicates:  No Known Allergies     HPI   HPI    10/4/2017, 5:45 AM   History obtained from the ex wife and patient      History of Present Illness: Barb Carson is a 61 y.o. male patient with hx of alcohol abuse who presents to the Emergency Department for N/V which onset suddenly around 12 AM this morning. Symptoms are episodic and moderate in severity. Ex wife reports that patient has tried quitting on his own in the past and also went to a 30 day rehab but eventually began drinking again. Pt drinks a fifth of whiskey each day; he states that he last drank yesterday morning because he is trying to quit for his job. No mitigating or exacerbating factors reported. Associated sx include abdominal pain. Ex wife also states that patient has SI. Per ex wife, pt reports that he "just wants to lay there and die." She also states that patient was writing solomon to his children. Patient confirms this.  He takes Zoloft for depression but has not been taking them as prescribed. Patient denies fever, chills, CP, SOB, extremity weakness/numbness, diarrhea, dizziness, HA, seizures, confusion, and all other sxs at this time. No further complaints or concerns at this time.        Arrival mode: Personal vehicle    PCP: Tony Paul DO       Past Medical History:  Past Medical History:   Diagnosis Date    Alcohol abuse     Anxiety        Past Surgical History:  History reviewed. No pertinent surgical history.      Family History:  History reviewed. No pertinent family history reviewed.    Social History:  Social History     Social History Main Topics    Smoking " status: Current Every Day Smoker     Packs/day: 2.00     Types: Cigarettes    Smokeless tobacco: Never Used    Alcohol use Yes      Comment: daily- fifth of whiskey    Drug use: No    Sexual activity: No       ROS   Review of Systems   Constitutional: Negative for chills, diaphoresis and fever.   HENT: Negative for sore throat.    Respiratory: Negative for cough and shortness of breath.    Cardiovascular: Negative for chest pain, palpitations and leg swelling.   Gastrointestinal: Positive for abdominal pain, nausea and vomiting. Negative for blood in stool, constipation and diarrhea.   Genitourinary: Negative for dysuria.   Musculoskeletal: Negative for back pain.   Skin: Negative for rash.   Neurological: Negative for dizziness, tremors, seizures, syncope, facial asymmetry, speech difficulty, weakness, light-headedness, numbness and headaches.   Hematological: Does not bruise/bleed easily.   Psychiatric/Behavioral: Positive for suicidal ideas. Negative for confusion, hallucinations and self-injury.        (-) HI   All other systems reviewed and are negative.      Physical Exam      Initial Vitals [10/04/17 0454]   BP Pulse Resp Temp SpO2   (!) 139/94 83 20 -- 98 %      MAP       109          Physical Exam  Nursing Notes and Vital Signs Reviewed.  Constitutional: Patient is in no acute distress. Awake and alert. Ill appearing. Tremulous.  Head: Atraumatic. Normocephalic.  Eyes: PERRL. EOM intact. Conjunctivae are not pale. No scleral icterus.  ENT: Mucous membranes are moist. Oropharynx is clear and symmetric.    Neck: Supple. Full ROM. No lymphadenopathy.  Cardiovascular: Regular rate. Regular rhythm. No murmurs, rubs, or gallops. Distal pulses are 2+ and symmetric.  Pulmonary/Chest: No respiratory distress. Clear to auscultation bilaterally. No wheezing, rales, or rhonchi.  Abdominal: Soft and non-distended.  There is no tenderness.  No rebound, guarding, or rigidity.  Good bowel sounds.    Musculoskeletal:  "Moves all extremities. No obvious deformities. No edema. No calf tenderness.  Skin: Warm and dry.  Neurological:  Alert, awake, and appropriate. Oriented x4. But slow to respond. Normal speech. Cranial nerves are intact.  No acute focal neurological deficits are appreciated.  Psychiatric:               Behavior: cooperative, appears withdrawn              Mood and Affect: depressed affect              Thought Process: within normal limits              Suicidal Ideations: Yes              Suicidal Plan: Staying home during withdrawals               Homicidal Ideations: No              Hallucinations: none  .    ED Course    Procedures  ED Vital Signs:  Vitals:    10/04/17 0454   BP: (!) 139/94   Pulse: 83   Resp: 20   TempSrc: Oral   SpO2: 98%   Weight: 81.6 kg (180 lb)   Height: 5' 9" (1.753 m)       Abnormal Lab Results:  Labs Reviewed   ACETAMINOPHEN LEVEL - Abnormal; Notable for the following:        Result Value    Acetaminophen (Tylenol), Serum 4.0 (*)     All other components within normal limits   COMPREHENSIVE METABOLIC PANEL - Abnormal; Notable for the following:     Potassium 3.2 (*)     Chloride 93 (*)     Glucose 145 (*)     Total Protein 8.6 (*)     Total Bilirubin 2.6 (*)     AST 99 (*)     ALT 99 (*)     Anion Gap 20 (*)     All other components within normal limits   CBC W/ AUTO DIFFERENTIAL - Abnormal; Notable for the following:     MCH 34.6 (*)     RDW 17.4 (*)     Platelets 102 (*)     Lymph # 0.7 (*)     Gran% 82.3 (*)     Lymph% 9.2 (*)     All other components within normal limits   SALICYLATE LEVEL - Abnormal; Notable for the following:     Salicylate Lvl <5.0 (*)     All other components within normal limits   URINALYSIS - Abnormal; Notable for the following:     Specific Gravity, UA >=1.030 (*)     Protein, UA 2+ (*)     Bilirubin (UA) 2+ (*)     Occult Blood UA Trace (*)     Urobilinogen, UA 4.0-6.0 (*)     All other components within normal limits   DRUG SCREEN PANEL, URINE EMERGENCY "   ALCOHOL,MEDICAL (ETHANOL)   TSH   URINALYSIS MICROSCOPIC        All Lab Results:  Results for orders placed or performed during the hospital encounter of 10/04/17   Acetaminophen level   Result Value Ref Range    Acetaminophen (Tylenol), Serum 4.0 (L) 10.0 - 20.0 ug/mL   Comprehensive metabolic panel   Result Value Ref Range    Sodium 138 136 - 145 mmol/L    Potassium 3.2 (L) 3.5 - 5.1 mmol/L    Chloride 93 (L) 95 - 110 mmol/L    CO2 25 23 - 29 mmol/L    Glucose 145 (H) 70 - 110 mg/dL    BUN, Bld 11 6 - 20 mg/dL    Creatinine 1.0 0.5 - 1.4 mg/dL    Calcium 9.8 8.7 - 10.5 mg/dL    Total Protein 8.6 (H) 6.0 - 8.4 g/dL    Albumin 4.3 3.5 - 5.2 g/dL    Total Bilirubin 2.6 (H) 0.1 - 1.0 mg/dL    Alkaline Phosphatase 107 55 - 135 U/L    AST 99 (H) 10 - 40 U/L    ALT 99 (H) 10 - 44 U/L    Anion Gap 20 (H) 8 - 16 mmol/L    eGFR if African American >60 >60 mL/min/1.73 m^2    eGFR if non African American >60 >60 mL/min/1.73 m^2   CBC auto differential   Result Value Ref Range    WBC 7.15 3.90 - 12.70 K/uL    RBC 4.77 4.60 - 6.20 M/uL    Hemoglobin 16.5 14.0 - 18.0 g/dL    Hematocrit 45.8 40.0 - 54.0 %    MCV 96 82 - 98 fL    MCH 34.6 (H) 27.0 - 31.0 pg    MCHC 36.0 32.0 - 36.0 g/dL    RDW 17.4 (H) 11.5 - 14.5 %    Platelets 102 (L) 150 - 350 K/uL    MPV 9.4 9.2 - 12.9 fL    Gran # 5.9 1.8 - 7.7 K/uL    Lymph # 0.7 (L) 1.0 - 4.8 K/uL    Mono # 0.6 0.3 - 1.0 K/uL    Eos # 0.0 0.0 - 0.5 K/uL    Baso # 0.01 0.00 - 0.20 K/uL    Gran% 82.3 (H) 38.0 - 73.0 %    Lymph% 9.2 (L) 18.0 - 48.0 %    Mono% 8.4 4.0 - 15.0 %    Eosinophil% 0.0 0.0 - 8.0 %    Basophil% 0.1 0.0 - 1.9 %    Differential Method Automated    Drug screen panel, emergency   Result Value Ref Range    Benzodiazepines Negative     Methadone metabolites Negative     Cocaine (Metab.) Negative     Opiate Scrn, Ur Negative     Barbiturate Screen, Ur Negative     Amphetamine Screen, Ur Negative     THC Negative     Phencyclidine Negative     Creatinine, Random Ur 279.3  23.0 - 375.0 mg/dL    Toxicology Information SEE COMMENT    Ethanol   Result Value Ref Range    Alcohol, Medical, Serum <10 <10 mg/dL   Salicylate level   Result Value Ref Range    Salicylate Lvl <5.0 (L) 15.0 - 30.0 mg/dL   Urinalysis   Result Value Ref Range    Specimen UA Urine, Clean Catch     Color, UA Yellow Yellow, Straw, Daily    Appearance, UA Clear Clear    pH, UA 6.0 5.0 - 8.0    Specific Gravity, UA >=1.030 (A) 1.005 - 1.030    Protein, UA 2+ (A) Negative    Glucose, UA Negative Negative    Ketones, UA Negative Negative    Bilirubin (UA) 2+ (A) Negative    Occult Blood UA Trace (A) Negative    Nitrite, UA Negative Negative    Urobilinogen, UA 4.0-6.0 (A) <2.0 EU/dL    Leukocytes, UA Negative Negative   TSH   Result Value Ref Range    TSH 0.420 0.400 - 4.000 uIU/mL   Urinalysis Microscopic   Result Value Ref Range    RBC, UA 0 0 - 4 /hpf    WBC, UA 0 0 - 5 /hpf    Bacteria, UA Rare None-Occ /hpf    Hyaline Casts, UA 0 0-1/lpf /lpf    Microscopic Comment SEE COMMENT      The EKG was ordered, reviewed, and independently interpreted by the ED provider.  Interpretation time: 5:40  Rate: 77 BPM  Rhythm: normal sinus rhythm with sinus arrhythmia  Interpretation: Possible left atrial enlargement. Incomplete RBBB. No STEMI.    The Emergency Provider reviewed the vital signs and test results, which are outlined above.    ED Discussion     5:45 AM: The PEC hold has been issued by Dr. Aguilar at this time for SI.    7:21 AM: Pt has been medically cleared by Dr. Aguilar at this time. Reassessed pt at this time. He is feeling much better. His tremors have improved. We have orally corrected his potassium. Pt is resting comfortably and appears in no acute distress. There are no psychiatric services offered at this facility. D/w pt all pertinent ED information and plan to transfer to psychiatric facility for psychiatric treatment. Pt verbalizes understanding. Patient will be transferred by [facility] for ongoing personal  protection en route. Pt will be transported by personnel trained in CPR and CPI. All questions and complaints have been addressed at this time. We will continue PRN Valium for mgmt of withdrawal symptoms.  Patient does NOT currently demonstrates signs of DTs.  Pt condition is stable at this time and is clear to transfer to psychiatric facility at this time.  D/w patient and his ex wife that he needs outpatient f/u with GI following discharge from psychiatric facility for assessment of chronic signs of liver disease.    Pre-hypertension/Hypertension: The pt has been informed that they may have pre-hypertension or hypertension based on a blood pressure reading in the ED. I recommend that the pt call the PCP listed on their discharge instructions or a physician of their choice this week to arrange f/u for further evaluation of possible pre-hypertension or hypertension.     ED Medication(s):  Medications   diazePAM tablet 10 mg (not administered)   ondansetron injection 4 mg (4 mg Intravenous Given 10/4/17 0521)   diazePAM injection 5 mg (5 mg Intravenous Given 10/4/17 0547)   sodium chloride 0.9% 1,000 mL with mvi, adult no.4 with vit K 3,300 unit- 150 mcg/10 mL 10 mL, thiamine 100 mg, folic acid 1 mg infusion ( Intravenous Stopped 10/4/17 0722)   diazePAM injection 5 mg (5 mg Intravenous Given 10/4/17 0649)   potassium chloride SA CR tablet 40 mEq (40 mEq Oral Given 10/4/17 0650)       New Prescriptions    No medications on file            Medical Decision Making    Medical Decision Making:   Clinical Tests:   Lab Tests: Ordered and Reviewed  Medical Tests: Reviewed and Ordered           Scribe Attestation:   Scribe #1: I performed the above scribed service and the documentation accurately describes the services I performed. I attest to the accuracy of the note.    Attending:   Physician Attestation Statement for Scribe #1: I, Durga Aguilar MD, personally performed the services described in this documentation, as  scribed by Lynnette Montoya, in my presence, and it is both accurate and complete.        Clinical Impression       ICD-10-CM ICD-9-CM   1. Suicidal ideation R45.851 V62.84   2. Alcoholism F10.20 303.90   3. Severe episode of recurrent major depressive disorder, without psychotic features F33.2 296.33   4. Medically noncompliant Z91.19 V15.81   5. Tobacco abuse Z72.0 305.1   6. Elevated blood pressure reading without diagnosis of hypertension R03.0 796.2   7. Elevated LFTs R79.89 790.6   8. Alcohol withdrawal syndrome without complication F10.230 291.81       Disposition:   Disposition: Transferred  Condition: Stable         Durga Aguilar MD  10/04/17 0743

## 2017-10-04 NOTE — ED NOTES
PEC packet faxed to: [facility], [place], [facility] [place], [facility], [facility], [facility], [facility], [facility], [place], [facility], [facility], [facility], [facility], [facility].

## 2017-10-04 NOTE — TELEPHONE ENCOUNTER
Tried calling  at her work number with but she was not there so called pts number and no answer or way to leave a msg to let pt know Rx was sent to the pharmacy.

## 2017-10-04 NOTE — ED NOTES
Jason Bertrand at [facility] ([address]. [place], [place]) patient has been accepted under the care of Dr. Dudley. Call report to [phone].

## 2018-05-10 ENCOUNTER — OFFICE VISIT (OUTPATIENT)
Dept: INTERNAL MEDICINE | Facility: CLINIC | Age: 63
End: 2018-05-10
Payer: COMMERCIAL

## 2018-05-10 VITALS
WEIGHT: 197.56 LBS | RESPIRATION RATE: 18 BRPM | HEART RATE: 80 BPM | BODY MASS INDEX: 29.26 KG/M2 | SYSTOLIC BLOOD PRESSURE: 101 MMHG | DIASTOLIC BLOOD PRESSURE: 80 MMHG | OXYGEN SATURATION: 97 % | TEMPERATURE: 98 F | HEIGHT: 69 IN

## 2018-05-10 DIAGNOSIS — F41.9 ANXIETY: Primary | ICD-10-CM

## 2018-05-10 DIAGNOSIS — F10.20 ALCOHOL DEPENDENCE, BINGE PATTERN: ICD-10-CM

## 2018-05-10 DIAGNOSIS — Z12.11 COLON CANCER SCREENING: ICD-10-CM

## 2018-05-10 PROBLEM — R11.2 NON-INTRACTABLE VOMITING WITH NAUSEA: Status: RESOLVED | Noted: 2017-09-25 | Resolved: 2018-05-10

## 2018-05-10 PROBLEM — R17 TOTAL BILIRUBIN, ELEVATED: Status: RESOLVED | Noted: 2017-04-24 | Resolved: 2018-05-10

## 2018-05-10 PROBLEM — R05.9 COUGH: Status: RESOLVED | Noted: 2017-09-25 | Resolved: 2018-05-10

## 2018-05-10 PROBLEM — F10.920 ALCOHOLIC INTOXICATION WITHOUT COMPLICATION: Status: RESOLVED | Noted: 2017-09-25 | Resolved: 2018-05-10

## 2018-05-10 PROBLEM — F10.11 H/O ALCOHOL ABUSE: Status: RESOLVED | Noted: 2017-08-15 | Resolved: 2018-05-10

## 2018-05-10 PROCEDURE — 99214 OFFICE O/P EST MOD 30 MIN: CPT | Mod: S$GLB,,, | Performed by: FAMILY MEDICINE

## 2018-05-10 PROCEDURE — 99999 PR PBB SHADOW E&M-EST. PATIENT-LVL III: CPT | Mod: PBBFAC,,, | Performed by: FAMILY MEDICINE

## 2018-05-10 PROCEDURE — 3008F BODY MASS INDEX DOCD: CPT | Mod: CPTII,S$GLB,, | Performed by: FAMILY MEDICINE

## 2018-05-10 RX ORDER — BUPROPION HYDROCHLORIDE 300 MG/1
300 TABLET ORAL DAILY
Refills: 2 | COMMUNITY
Start: 2018-05-07 | End: 2018-08-08 | Stop reason: SDUPTHER

## 2018-05-10 RX ORDER — LORAZEPAM 1 MG/1
1 TABLET ORAL 2 TIMES DAILY PRN
Qty: 10 TABLET | Refills: 1 | Status: SHIPPED | OUTPATIENT
Start: 2018-05-10 | End: 2018-05-14 | Stop reason: SDUPTHER

## 2018-05-10 RX ORDER — ONDANSETRON 4 MG/1
4 TABLET, FILM COATED ORAL EVERY 8 HOURS PRN
Qty: 12 TABLET | Refills: 1 | Status: SHIPPED | OUTPATIENT
Start: 2018-05-10 | End: 2018-05-14 | Stop reason: SDUPTHER

## 2018-05-10 RX ORDER — NALTREXONE HYDROCHLORIDE 50 MG/1
50 TABLET, FILM COATED ORAL NIGHTLY
Refills: 2 | COMMUNITY
Start: 2018-05-07 | End: 2018-08-08

## 2018-05-10 RX ORDER — FOLIC ACID 1 MG/1
TABLET ORAL
Refills: 2 | COMMUNITY
Start: 2018-05-07 | End: 2018-08-08 | Stop reason: SDUPTHER

## 2018-05-10 RX ORDER — VENLAFAXINE HYDROCHLORIDE 75 MG/1
75 CAPSULE, EXTENDED RELEASE ORAL DAILY
Refills: 2 | COMMUNITY
Start: 2018-05-07 | End: 2018-08-08

## 2018-05-10 RX ORDER — IBUPROFEN 200 MG
200 TABLET ORAL
COMMUNITY

## 2018-05-10 NOTE — ASSESSMENT & PLAN NOTE
Encouraged him to continue using the Effexor and Wellbutrin on a routine basis.  Continue the naltrexone.  I provided him today with a few pills of Ativan for episodes of acute anxiety.

## 2018-05-10 NOTE — PROGRESS NOTES
Subjective:       Patient ID: Barb Carson is a 62 y.o. male.    Chief Complaint: Emesis; Anorexia; Insomnia; and Dizziness    HPI  Barb came in today complaining of symptoms of nausea anxiety and jitteriness.  In the past for the symptoms he has used some Zofran and rarely will use Ativan whenever he is feeling jittery.  He has a longstanding history of alcoholism and has been to rehab within the last year.  He has been to the emergency room a couple times within the last year due to alcohol intoxication and even recently had some suicidal ideations.  He is now on naltrexone which she takes every night.  He says this does seem to make him feel more ill and discard the routine use of alcohol.  Today, he says the last time he had anything to drink was about 5 days ago.   He has been going to some alcoholics anonymous meetings but only goes about 3 times per month.  He is still working but he genuinely is concerned about losing his job.    History reviewed. No pertinent family history.    Current Outpatient Prescriptions:     buPROPion (WELLBUTRIN XL) 300 MG 24 hr tablet, Take 300 mg by mouth once daily., Disp: , Rfl: 2    folic acid (FOLVITE) 1 MG tablet, TAKE ONE TABLET BY MOUTH AS NEEDED AS DIRECTED, Disp: , Rfl: 2    multivitamin capsule, Take 1 capsule by mouth once daily., Disp: , Rfl:     naltrexone (DEPADE) 50 mg tablet, Take 50 mg by mouth nightly., Disp: , Rfl: 2    venlafaxine (EFFEXOR-XR) 75 MG 24 hr capsule, Take 75 mg by mouth once daily., Disp: , Rfl: 2    ibuprofen (ADVIL,MOTRIN) 200 MG tablet, Take 200 mg by mouth., Disp: , Rfl:     LORazepam (ATIVAN) 1 MG tablet, Take 1 tablet (1 mg total) by mouth 2 (two) times daily as needed for Anxiety., Disp: 10 tablet, Rfl: 1    ondansetron (ZOFRAN) 4 MG tablet, Take 1 tablet (4 mg total) by mouth every 8 (eight) hours as needed for Nausea., Disp: 12 tablet, Rfl: 1    Review of Systems   Constitutional: Negative for chills and fever.   Eyes: Negative  "for visual disturbance.   Respiratory: Negative for cough and shortness of breath.    Cardiovascular: Negative for chest pain.   Gastrointestinal: Positive for nausea. Negative for abdominal pain.   Neurological: Negative for dizziness.   Psychiatric/Behavioral: The patient is nervous/anxious.        Objective:   /80 (BP Location: Left arm, Patient Position: Sitting, BP Method: Large (Automatic))   Pulse 80   Temp 98.4 °F (36.9 °C) (Tympanic)   Resp 18   Ht 5' 9" (1.753 m)   Wt 89.6 kg (197 lb 8.5 oz)   SpO2 97%   BMI 29.17 kg/m²      Physical Exam   Constitutional: He is oriented to person, place, and time. He appears well-developed and well-nourished.   HENT:   Head: Normocephalic and atraumatic.   Eyes: Conjunctivae are normal.   Cardiovascular: Normal rate.    Pulmonary/Chest: Effort normal. No respiratory distress.   Musculoskeletal: He exhibits no edema.   Neurological: He is alert and oriented to person, place, and time. Coordination normal.   Skin: Skin is warm and dry. No rash noted.   Psychiatric: He has a normal mood and affect. His behavior is normal.   Vitals reviewed.      Assessment & Plan     Problem List Items Addressed This Visit        Psychiatric    Anxiety - Primary    Current Assessment & Plan     Encouraged him to continue using the Effexor and Wellbutrin on a routine basis.  Continue the naltrexone.  I provided him today with a few pills of Ativan for episodes of acute anxiety.         Alcohol dependence, binge pattern    Current Assessment & Plan     Emphasized that I believe he can quit and beat this thing.  Told him that he should go to alcoholics anonymous meetings every day or at least 5 days a week.  3 times a month will not be sufficient.  Another option for him would be going back to rehab and he should not feel that he is a failure because he has to go.  I told him that many people have had to go to rehab multiple times before they are able to ultimately did feet the " addiction.           Other Visit Diagnoses     Colon cancer screening        Relevant Orders    Fecal Immunochemical Test (iFOBT)            No Follow-up on file.

## 2018-05-10 NOTE — ASSESSMENT & PLAN NOTE
Emphasized that I believe he can quit and beat this thing.  Told him that he should go to alcoholics anonymous meetings every day or at least 5 days a week.  3 times a month will not be sufficient.  Another option for him would be going back to rehab and he should not feel that he is a failure because he has to go.  I told him that many people have had to go to rehab multiple times before they are able to ultimately did feet the addiction.

## 2018-05-15 RX ORDER — LORAZEPAM 1 MG/1
TABLET ORAL
Qty: 10 TABLET | Refills: 0 | Status: SHIPPED | OUTPATIENT
Start: 2018-05-15 | End: 2018-08-08

## 2018-05-15 RX ORDER — ONDANSETRON 4 MG/1
TABLET, FILM COATED ORAL
Qty: 12 TABLET | Refills: 1 | Status: SHIPPED | OUTPATIENT
Start: 2018-05-15 | End: 2018-08-08

## 2018-05-15 NOTE — TELEPHONE ENCOUNTER
He should not be taking lorazepam routinely but only for severe anxiety. If he feels he needs something else after this refill, he will need to make appointment so we can consider other medication for his nerves. Taking lorazepam so routinely is not advised.

## 2018-08-08 ENCOUNTER — OFFICE VISIT (OUTPATIENT)
Dept: INTERNAL MEDICINE | Facility: CLINIC | Age: 63
End: 2018-08-08
Payer: COMMERCIAL

## 2018-08-08 VITALS
BODY MASS INDEX: 28.73 KG/M2 | WEIGHT: 194 LBS | TEMPERATURE: 98 F | DIASTOLIC BLOOD PRESSURE: 66 MMHG | HEIGHT: 69 IN | HEART RATE: 105 BPM | SYSTOLIC BLOOD PRESSURE: 90 MMHG

## 2018-08-08 DIAGNOSIS — F32.A DEPRESSION, UNSPECIFIED DEPRESSION TYPE: ICD-10-CM

## 2018-08-08 DIAGNOSIS — F41.9 ANXIETY: ICD-10-CM

## 2018-08-08 DIAGNOSIS — F10.20 ALCOHOL DEPENDENCE, BINGE PATTERN: Primary | ICD-10-CM

## 2018-08-08 PROCEDURE — 99999 PR PBB SHADOW E&M-EST. PATIENT-LVL III: CPT | Mod: PBBFAC,,, | Performed by: NURSE PRACTITIONER

## 2018-08-08 PROCEDURE — 99214 OFFICE O/P EST MOD 30 MIN: CPT | Mod: S$GLB,,, | Performed by: NURSE PRACTITIONER

## 2018-08-08 PROCEDURE — 3008F BODY MASS INDEX DOCD: CPT | Mod: CPTII,S$GLB,, | Performed by: NURSE PRACTITIONER

## 2018-08-08 RX ORDER — ONDANSETRON 4 MG/1
4 TABLET, ORALLY DISINTEGRATING ORAL EVERY 8 HOURS PRN
Qty: 30 TABLET | Refills: 0 | Status: SHIPPED | OUTPATIENT
Start: 2018-08-08 | End: 2019-10-29 | Stop reason: SDUPTHER

## 2018-08-08 RX ORDER — BUPROPION HYDROCHLORIDE 300 MG/1
300 TABLET ORAL DAILY
Qty: 30 TABLET | Refills: 1 | Status: SHIPPED | OUTPATIENT
Start: 2018-08-08

## 2018-08-08 RX ORDER — LORAZEPAM 1 MG/1
1 TABLET ORAL 2 TIMES DAILY PRN
Qty: 10 TABLET | Refills: 0 | Status: SHIPPED | OUTPATIENT
Start: 2018-08-08 | End: 2019-10-29 | Stop reason: SDUPTHER

## 2018-08-08 RX ORDER — FOLIC ACID 1 MG/1
TABLET ORAL
Qty: 30 TABLET | Refills: 2 | Status: SHIPPED | OUTPATIENT
Start: 2018-08-08

## 2018-08-08 RX ORDER — PROPRANOLOL HYDROCHLORIDE 10 MG/1
10 TABLET ORAL 2 TIMES DAILY
Refills: 2 | COMMUNITY
Start: 2018-07-19 | End: 2020-03-26 | Stop reason: ALTCHOICE

## 2018-08-08 NOTE — PATIENT INSTRUCTIONS
"  Alcohol Abuse  Alcoholic drinks are harmful when you have too many of them. There is no set number of drinks that defines too much. Drinking that disrupts your life or your health is called alcohol abuse. Alcohol abuse can hurt your relationships with others. You may lose friends, a spouse, or even your job. You may be abusing alcohol if any of the following are true for you:  · Duties at home or with  suffer because of drinking.  · Duties at work or in school suffer because of drinking.  · You have missed work or school because of drinking.  · You use alcohol while driving or operating machinery.  · You have legal problems such as arrests due to drinking.  · You keep drinking even though it causes serious problems in your life.  Health effects  Alcohol abuse causes health problems. Sometimes this can happen after only drinking a little." There is no set number of drinks or amount of alcohol that defines too much. The more you drink at one time, and the more often you drink determine both the short-term and long-term health effects. It affects all parts of your body and your health, including your:  · Brain. Alcohol is a central nervous system depressant. It can damage parts of the brain that affect your balance, memory, thinking, and emotions. It can cause memory loss, blackouts, depression, agitation, sleep cycle changes, and seizures. These changes may or may not be reversible.  · Heart and vascular system. Alcohol affects multiple areas. It can damage heart muscle causing cardiomyopathy, which is a weakening and stretching of the heart muscle. This can lead to trouble breathing, an irregular heartbeat, atrial fibrillation, leg swelling, and heart failure. Alcohol use makes the blood vessels stiffen causing high blood pressure. All of these problems increase your risk of having heart attacks or strokes.  · Liver. Alcohol causes fat to build up in the liver, affecting its normal function. This " increases the risk for hepatitis, leading to abdominal pain, appetite loss, jaundice, bleeding problems, liver fibrosis, and cirrhosis. This, in turn, can affect your ability to fight off infections, and can cause diabetes. The liver changes prevent it from removing toxins in your blood that can cause encephalopathy, which may show with confusion, altered level of consciousness, personality changes, memory loss, seizures, coma, and death.  · Pancreas. Alcohol can cause inflammation of the pancreas, or pancreatitis. This can cause abdominal pain, fever, and diabetes.  · Immune system. Alcohol weakens your immune system in a number of ways. It suppresses your immune system making it harder to fight infections and colds. It also increases the chance of getting pneumonia and tuberculosis.  · Cancer. Alcohol is a risk factor for developing cancer of the mouth, esophagus, pharynx, larynx, liver, and breast.  · Sexual function. Alcohol can lead to sexual problems.  Home care  The following guidelines will help you deal with alcohol abuse:  · Admit you have a problem with alcohol.  · Ask for help from your healthcare provider and trusted family members or close friends.  · Get help from people trained in dealing with alcohol abuse. This may be individual counseling or group therapy, or it may be a supervised alcohol treatment program.  · Join a self-help group for alcohol abuse such as Alcoholics Anonymous (AA).  · Avoid people who abuse alcohol or tempt you to drink.  Follow-up care  Follow up as advised by the healthcare provider, or as advised. Contact these groups to get help:  · Alcoholics Anonymous (AA): Go to www.aa.org or check the phone book for meetings near you.  · National Alcohol and Substance Abuse Information Center (NASAIC): 993.906.6467 www.addictioncareoptions.com  · National [place] on Alcoholism and Drug Dependence (NCADD): 434-OTC-XAHQ (342-7852) www.ncadd.org  Call 911  Call 911 if any of these  occur:  · Trouble breathing or slow irregular breathing  · Chest pain  · Sudden weakness on one side of your body or sudden trouble speaking  · Heavy bleeding or vomiting blood  · Very drowsy or trouble awakening  · Fainting or loss of consciousness  · Rapid heart rate  · Seizure  When to seek medical care  Call your healthcare provider right away if any of these occur:   · Confusion  · Hallucinations (seeing, hearing, or feeling things that arent there)  · Pain in your upper abdomen that gets worse  · Repeated vomiting or black or tarry stools  · Severe shakiness  Date Last Reviewed: 6/1/2016 © 2000-2017 [employer]. [address] [place] [id number]. All rights reserved. This information is not intended as a substitute for professional medical care. Always follow your healthcare professional's instructions.        Recovering from Addiction  Recovery means making a new life for yourself. This includes finding new interests. It involves building new relationships. It means taking better care of yourself. These will all help you replace substance use with a new and healthier life. They will also help you avoid the things that could make you want to use again.    Make lifestyle changes  A big part of recovery is changing habits. These are habits that may have led to your substance abuse. Its also a time for personal growth. Below are some changes you may want to make.  · Find new activities and goals. You may want to try new hobbies and interests. Or, you may want to join an activity group to meet new people.  · Build relationships. You may choose to spend more time with loved ones you lost touch with while you were using. You may also want to make new friends. And there may be some friends or family members you will not want to see because they are still using.  · Exercise and eat well. Get some physical activity on most days. This can help you feel better. Eat healthy meals with lots of  fruits, vegetables, and whole grains. This can also help your well-being. A nutritionist or fitness expert can help you.  · Maintain ties with medical and addiction professionals. While you may not need intense professional support, it is important to have reliable safety nets so you can access professional assistance when needed.  · Relax and get enough sleep. Good sleep can help you feel better. So can less stress. Ask your counselor about relaxation exercises. These may include meditation. Also ask about stress management classes.  Date Last Reviewed: 2/1/2017 © 2000-2017 [employer]. [address] [place] [id number]. All rights reserved. This information is not intended as a substitute for professional medical care. Always follow your healthcare professional's instructions.        Understanding Alcoholism    Alcoholism is a disease in which a person is dependent on a drug--alcohol. The disease can harm the persons physical and mental health. It can also affect his or her behavior. Alcoholism is not a character flaw. It is not a moral failure. It is a disease that worsens over time. Untreated, it can lead to brain damage and death. Recovery is possible if drinking is stopped.  Effects of alcoholism  Behavioral effects  Drinking is the main behavior of people with alcoholism. They develop a private relationship with drinking. They guard it. They give it their time, money, and attention. This may happen at the expense of family and friends. They lie for it and think about it all the time. They may risk losing their families for it. They may risk their lives for it. Despite the harm it causes, they cant control the drinking.  Health risks  People with alcoholism are at high risk for health problems. These include heart disease and cancer. They also include mental illness. People with the disease may not heal from illness normally. Unless drinking is stopped, it can cause death. Death may  result from organ failure, cancer, or common viruses. Death may also result from accidents or suicide.  Physical effects  Alcohol can be like a poison to the body. It kills cells. Heavy drinking over a long time can greatly harm the bodys organs. These can include the brain, heart, liver, and pancreas. Chronic drinking also harms the digestive tract. It harms the immune system as well. This leaves the body vulnerable to serious disease.  Psychological effects  Alcoholism can lead to a type of distorted thinking known as alcohol-think. One of the most common forms of this is denial. This is when the person denies that drinking is a problem. He or she may deny that any of the problems in his or her life are caused by drinking.   Date Last Reviewed: 6/1/2016 © 2000-2017 [employer]. [address] [place] [address], [place] [address]. All rights reserved. This information is not intended as a substitute for professional medical care. Always follow your healthcare professional's instructions.

## 2018-08-14 NOTE — PROGRESS NOTES
Subjective:       Patient ID: Barb Carson is a 62 y.o. male.    Chief Complaint: Nausea; Insomnia; and Emesis    Patient has a history of alcohol abuse.  He has been on a [place] of drinking about of 5th of liquor daily.  He was doing this for about 2 weeks.  He stopped drinking about 5 days ago.  He presents with nausea, vomiting, restlessness, anxiety, insomnia.  No tremors or hallucinations.  Patient states that he cannot enter inpatient rehab at this time as he will lose his job.  He is motivated to quit drinking but has found this extremely difficult in the past.      Review of Systems   Constitutional: Negative for diaphoresis, fatigue and fever.   Respiratory: Negative for chest tightness and shortness of breath.    Cardiovascular: Negative for chest pain, palpitations and leg swelling.   Gastrointestinal: Negative for abdominal pain, constipation, diarrhea, nausea and vomiting.   Musculoskeletal: Negative for arthralgias and myalgias.   Skin: Negative for rash.   Neurological: Negative for dizziness, weakness, light-headedness and headaches.   Psychiatric/Behavioral: Positive for dysphoric mood and sleep disturbance. The patient is nervous/anxious.        Objective:      Physical Exam   Constitutional: He is oriented to person, place, and time. He appears well-developed and well-nourished. No distress.   HENT:   Head: Normocephalic and atraumatic.   Eyes: Conjunctivae are normal. Pupils are equal, round, and reactive to light.   Cardiovascular: Normal rate and regular rhythm.   Pulmonary/Chest: Effort normal and breath sounds normal. No respiratory distress. He has no wheezes.   Musculoskeletal: He exhibits no edema.   Neurological: He is alert and oriented to person, place, and time. Coordination normal.   Skin: Skin is warm and dry. No rash noted. He is not diaphoretic.   Psychiatric: His behavior is normal. His mood appears anxious. He exhibits a depressed mood.   Nursing note and vitals reviewed.       Assessment:       1. Alcohol dependence, binge pattern    2. Anxiety    3. Depression, unspecified depression type        Plan:     Problem List Items Addressed This Visit        Psychiatric    Depression    Current Assessment & Plan     Refilling Wellbutrin.  Discussed need for additional treatment with patient however he was not interested at this time.         Relevant Medications    buPROPion (WELLBUTRIN XL) 300 MG 24 hr tablet    Anxiety    Current Assessment & Plan     Would like to see patient on SSRI, however patient is not wanting to take at this time.  Discussed need for possibly seeing psych.  Not wanting to at this time.         Relevant Medications    buPROPion (WELLBUTRIN XL) 300 MG 24 hr tablet    Alcohol dependence, binge pattern - Primary    Current Assessment & Plan     Discussed importance of alcohol cessation.  Recommended that patient get more serious about alcoholics anonymous.  He understands the importance of this.  Encouraged him to attend AA at least for 5 days a week.  Patient agreeable.         Relevant Medications    ondansetron (ZOFRAN-ODT) 4 MG TbDL    LORazepam (ATIVAN) 1 MG tablet    folic acid (FOLVITE) 1 MG tablet    multivitamin capsule        Follow-up if symptoms worsen or fail to improve.

## 2018-08-14 NOTE — ASSESSMENT & PLAN NOTE
Refilling Wellbutrin.  Discussed need for additional treatment with patient however he was not interested at this time.

## 2018-08-14 NOTE — ASSESSMENT & PLAN NOTE
Discussed importance of alcohol cessation.  Recommended that patient get more serious about alcoholics anonymous.  He understands the importance of this.  Encouraged him to attend AA at least for 5 days a week.  Patient agreeable.

## 2018-08-14 NOTE — ASSESSMENT & PLAN NOTE
Would like to see patient on SSRI, however patient is not wanting to take at this time.  Discussed need for possibly seeing psych.  Not wanting to at this time.

## 2018-09-21 ENCOUNTER — TELEPHONE (OUTPATIENT)
Dept: INTERNAL MEDICINE | Facility: CLINIC | Age: 63
End: 2018-09-21

## 2018-09-21 DIAGNOSIS — H60.90 OTITIS EXTERNA, UNSPECIFIED CHRONICITY, UNSPECIFIED LATERALITY, UNSPECIFIED TYPE: Primary | ICD-10-CM

## 2018-09-21 RX ORDER — CIPROFLOXACIN AND DEXAMETHASONE 3; 1 MG/ML; MG/ML
4 SUSPENSION/ DROPS AURICULAR (OTIC) 2 TIMES DAILY
Qty: 7.5 ML | Refills: 0 | Status: SHIPPED | OUTPATIENT
Start: 2018-09-21 | End: 2018-09-24

## 2018-09-21 NOTE — TELEPHONE ENCOUNTER
----- Message from Tamera Canseco sent at 9/21/2018  2:12 PM CDT -----  Contact: pt  States he would like to be seen today, he has an earache. Please call pt at [phone]. Thank you

## 2018-09-21 NOTE — TELEPHONE ENCOUNTER
Called and spoke with patient, who complain of having an earache. Patient states he have tried ear wax removal, peroxide and is still having tender to touch. Patient would like to know what else he can do? Please advise

## 2018-09-24 ENCOUNTER — OFFICE VISIT (OUTPATIENT)
Dept: INTERNAL MEDICINE | Facility: CLINIC | Age: 63
End: 2018-09-24
Payer: COMMERCIAL

## 2018-09-24 VITALS
DIASTOLIC BLOOD PRESSURE: 62 MMHG | TEMPERATURE: 98 F | HEART RATE: 78 BPM | HEIGHT: 69 IN | BODY MASS INDEX: 30.47 KG/M2 | SYSTOLIC BLOOD PRESSURE: 94 MMHG | WEIGHT: 205.69 LBS | OXYGEN SATURATION: 96 % | RESPIRATION RATE: 18 BRPM

## 2018-09-24 DIAGNOSIS — H60.391 OTHER INFECTIVE ACUTE OTITIS EXTERNA OF RIGHT EAR: Primary | ICD-10-CM

## 2018-09-24 PROBLEM — H60.501 ACUTE OTITIS EXTERNA OF RIGHT EAR: Status: ACTIVE | Noted: 2018-09-24

## 2018-09-24 PROCEDURE — 99214 OFFICE O/P EST MOD 30 MIN: CPT | Mod: S$GLB,,, | Performed by: FAMILY MEDICINE

## 2018-09-24 PROCEDURE — 99999 PR PBB SHADOW E&M-EST. PATIENT-LVL III: CPT | Mod: PBBFAC,,, | Performed by: FAMILY MEDICINE

## 2018-09-24 PROCEDURE — 3008F BODY MASS INDEX DOCD: CPT | Mod: CPTII,S$GLB,, | Performed by: FAMILY MEDICINE

## 2018-09-24 RX ORDER — NEOMYCIN SULFATE, POLYMYXIN B SULFATE AND HYDROCORTISONE 10; 3.5; 1 MG/ML; MG/ML; [USP'U]/ML
3 SUSPENSION/ DROPS AURICULAR (OTIC) 3 TIMES DAILY
Qty: 10 ML | Refills: 0 | Status: SHIPPED | OUTPATIENT
Start: 2018-09-24 | End: 2020-03-26 | Stop reason: ALTCHOICE

## 2018-09-24 RX ORDER — AMOXICILLIN 875 MG/1
TABLET, FILM COATED ORAL
Refills: 0 | COMMUNITY
Start: 2018-09-22 | End: 2019-10-29

## 2018-09-24 RX ORDER — OFLOXACIN 3 MG/ML
SOLUTION/ DROPS OPHTHALMIC
Refills: 0 | COMMUNITY
Start: 2018-09-22 | End: 2018-09-24

## 2018-09-24 NOTE — PROGRESS NOTES
Subjective:       Patient ID: Barb Carson is a 62 y.o. male.    Chief Complaint: Otalgia    Otalgia    There is pain in the right ear. This is a new problem. The current episode started in the past 7 days. The problem occurs constantly. The problem has been waxing and waning. There has been no fever. The pain is mild. Associated symptoms include hearing loss. Pertinent negatives include no abdominal pain, coughing, diarrhea, ear discharge, neck pain, rash, rhinorrhea or sore throat. He has tried antibiotics and ear drops (was seen at [facility] over the weekend) for the symptoms. The treatment provided mild relief. There is no history of a chronic ear infection. had cerumen impaction last year       History reviewed. No pertinent family history.    Current Outpatient Medications:     amoxicillin (AMOXIL) 875 MG tablet, TAKE ONE TABLET BY MOUTH EVERY TWELVE HOURS FOR 7 DAYS, Disp: , Rfl: 0    buPROPion (WELLBUTRIN XL) 300 MG 24 hr tablet, Take 1 tablet (300 mg total) by mouth once daily., Disp: 30 tablet, Rfl: 1    folic acid (FOLVITE) 1 MG tablet, TAKE ONE TABLET BY MOUTH AS NEEDED AS DIRECTED, Disp: 30 tablet, Rfl: 2    ibuprofen (ADVIL,MOTRIN) 200 MG tablet, Take 200 mg by mouth., Disp: , Rfl:     multivitamin capsule, Take 1 capsule by mouth once daily., Disp: , Rfl:     propranolol (INDERAL) 10 MG tablet, Take 10 mg by mouth 2 (two) times daily., Disp: , Rfl: 2    LORazepam (ATIVAN) 1 MG tablet, Take 1 tablet (1 mg total) by mouth 2 (two) times daily as needed for Anxiety., Disp: 10 tablet, Rfl: 0    neomycin-polymyxin-hydrocortisone (CORTISPORIN) 3.5-10,000-1 mg/mL-unit/mL-% otic suspension, Place 3 drops into the right ear 3 (three) times daily., Disp: 10 mL, Rfl: 0    ondansetron (ZOFRAN-ODT) 4 MG TbDL, Take 1 tablet (4 mg total) by mouth every 8 (eight) hours as needed., Disp: 30 tablet, Rfl: 0    Review of Systems   HENT: Positive for ear pain and hearing loss. Negative for ear discharge, rhinorrhea  "and sore throat.    Respiratory: Negative for cough.    Gastrointestinal: Negative for abdominal pain and diarrhea.   Musculoskeletal: Negative for neck pain.   Skin: Negative for rash.       Objective:   BP 94/62 (BP Location: Right arm, Patient Position: Sitting, BP Method: Medium (Manual))   Pulse 78   Temp 98.3 °F (36.8 °C) (Oral)   Resp 18   Ht 5' 9" (1.753 m)   Wt 93.3 kg (205 lb 11 oz)   SpO2 96%   BMI 30.38 kg/m²      Physical Exam   Constitutional: He is oriented to person, place, and time. He appears well-developed and well-nourished.   HENT:   Head: Normocephalic and atraumatic.   Left ear looks completely normal.  Was not able to visualize the tympanic membrane on the right side due to ear canal swelling.  No visualize discharge. No wax was seen   Eyes: Conjunctivae are normal.   Cardiovascular: Normal rate.   Pulmonary/Chest: Effort normal. No respiratory distress.   Musculoskeletal: He exhibits no edema.   Neurological: He is alert and oriented to person, place, and time. Coordination normal.   Skin: Skin is warm and dry. No rash noted.   Psychiatric: He has a normal mood and affect. His behavior is normal.   Vitals reviewed.      Assessment & Plan     Problem List Items Addressed This Visit        ENT    Acute otitis externa of right ear - Primary    Current Assessment & Plan     Finish amoxicillin. Switching from the ofloxacin to Cortisporin.  The ofloxacin that he was given was the ophthalmic formulation and I think he would benefit from topical steroid due to the swelling that he is having with discomfort.  Advised if he is not having improvement of his symptoms by the end of the week he should come back in so we can get a better exam was the swelling has going down.                 No Follow-up on file.  "

## 2018-09-24 NOTE — ASSESSMENT & PLAN NOTE
Finish amoxicillin. Switching from the ofloxacin to Cortisporin.  The ofloxacin that he was given was the ophthalmic formulation and I think he would benefit from topical steroid due to the swelling that he is having with discomfort.  Advised if he is not having improvement of his symptoms by the end of the week he should come back in so we can get a better exam was the swelling has going down.

## 2019-10-18 ENCOUNTER — TELEPHONE (OUTPATIENT)
Dept: ADMINISTRATIVE | Facility: HOSPITAL | Age: 64
End: 2019-10-18

## 2019-10-18 NOTE — TELEPHONE ENCOUNTER
Attempted to contact patient to schedule annual visit with PCP Dr Tony Paul, unable to leave voicemail message.[email or web address]

## 2019-10-29 ENCOUNTER — HOSPITAL ENCOUNTER (EMERGENCY)
Facility: HOSPITAL | Age: 64
Discharge: HOME | End: 2019-10-29
Attending: EMERGENCY MEDICINE
Payer: COMMERCIAL

## 2019-10-29 VITALS
WEIGHT: 191.69 LBS | DIASTOLIC BLOOD PRESSURE: 72 MMHG | HEART RATE: 74 BPM | RESPIRATION RATE: 16 BRPM | HEIGHT: 69 IN | OXYGEN SATURATION: 98 % | TEMPERATURE: 98 F | SYSTOLIC BLOOD PRESSURE: 116 MMHG | BODY MASS INDEX: 28.39 KG/M2

## 2019-10-29 DIAGNOSIS — F10.20 ALCOHOLISM: ICD-10-CM

## 2019-10-29 DIAGNOSIS — R11.2 NON-INTRACTABLE VOMITING WITH NAUSEA, UNSPECIFIED VOMITING TYPE: Primary | ICD-10-CM

## 2019-10-29 DIAGNOSIS — E87.6 HYPOKALEMIA: ICD-10-CM

## 2019-10-29 DIAGNOSIS — F10.939 WITHDRAWAL SYMPTOMS, ALCOHOL: ICD-10-CM

## 2019-10-29 DIAGNOSIS — F10.20 ALCOHOL DEPENDENCE, BINGE PATTERN: ICD-10-CM

## 2019-10-29 DIAGNOSIS — E87.8 HYPOCHLOREMIA: ICD-10-CM

## 2019-10-29 PROBLEM — H60.501 ACUTE OTITIS EXTERNA OF RIGHT EAR: Status: RESOLVED | Noted: 2018-09-24 | Resolved: 2019-10-29

## 2019-10-29 LAB
ALBUMIN SERPL BCP-MCNC: 4.6 G/DL (ref 3.5–5.2)
ALP SERPL-CCNC: 76 U/L (ref 55–135)
ALT SERPL W/O P-5'-P-CCNC: 65 U/L (ref 10–44)
ANION GAP SERPL CALC-SCNC: 13 MMOL/L (ref 8–16)
AST SERPL-CCNC: 65 U/L (ref 10–40)
BASOPHILS # BLD AUTO: 0.01 K/UL (ref 0–0.2)
BASOPHILS NFR BLD: 0.1 % (ref 0–1.9)
BILIRUB SERPL-MCNC: 2.1 MG/DL (ref 0.1–1)
BILIRUB UR QL STRIP: ABNORMAL
BUN SERPL-MCNC: 18 MG/DL (ref 8–23)
CALCIUM SERPL-MCNC: 9.2 MG/DL (ref 8.7–10.5)
CHLORIDE SERPL-SCNC: 91 MMOL/L (ref 95–110)
CLARITY UR REFRACT.AUTO: CLEAR
CO2 SERPL-SCNC: 31 MMOL/L (ref 23–29)
COLOR UR AUTO: ABNORMAL
CREAT SERPL-MCNC: 1.1 MG/DL (ref 0.5–1.4)
DIFFERENTIAL METHOD: ABNORMAL
EOSINOPHIL # BLD AUTO: 0 K/UL (ref 0–0.5)
EOSINOPHIL NFR BLD: 0.1 % (ref 0–8)
ERYTHROCYTE [DISTWIDTH] IN BLOOD BY AUTOMATED COUNT: 13.1 % (ref 11.5–14.5)
EST. GFR  (AFRICAN AMERICAN): >60 ML/MIN/1.73 M^2
EST. GFR  (NON AFRICAN AMERICAN): >60 ML/MIN/1.73 M^2
ETHANOL SERPL-MCNC: <10 MG/DL
GLUCOSE SERPL-MCNC: 126 MG/DL (ref 70–110)
GLUCOSE UR QL STRIP: NEGATIVE
HCT VFR BLD AUTO: 43 % (ref 40–54)
HGB BLD-MCNC: 15.5 G/DL (ref 14–18)
HGB UR QL STRIP: NEGATIVE
IMM GRANULOCYTES # BLD AUTO: 0.04 K/UL (ref 0–0.04)
IMM GRANULOCYTES NFR BLD AUTO: 0.5 % (ref 0–0.5)
KETONES UR QL STRIP: ABNORMAL
LEUKOCYTE ESTERASE UR QL STRIP: NEGATIVE
LYMPHOCYTES # BLD AUTO: 1.3 K/UL (ref 1–4.8)
LYMPHOCYTES NFR BLD: 15.6 % (ref 18–48)
MCH RBC QN AUTO: 34.2 PG (ref 27–31)
MCHC RBC AUTO-ENTMCNC: 36 G/DL (ref 32–36)
MCV RBC AUTO: 95 FL (ref 82–98)
MONOCYTES # BLD AUTO: 0.7 K/UL (ref 0.3–1)
MONOCYTES NFR BLD: 8 % (ref 4–15)
NEUTROPHILS # BLD AUTO: 6.2 K/UL (ref 1.8–7.7)
NEUTROPHILS NFR BLD: 75.7 % (ref 38–73)
NITRITE UR QL STRIP: NEGATIVE
NRBC BLD-RTO: 0 /100 WBC
PH UR STRIP: 6 [PH] (ref 5–8)
PLATELET # BLD AUTO: 180 K/UL (ref 150–350)
PMV BLD AUTO: 9.1 FL (ref 9.2–12.9)
POTASSIUM SERPL-SCNC: 3.2 MMOL/L (ref 3.5–5.1)
PROT SERPL-MCNC: 7.7 G/DL (ref 6–8.4)
PROT UR QL STRIP: ABNORMAL
RBC # BLD AUTO: 4.53 M/UL (ref 4.6–6.2)
SODIUM SERPL-SCNC: 135 MMOL/L (ref 136–145)
SP GR UR STRIP: 1.02 (ref 1–1.03)
URN SPEC COLLECT METH UR: ABNORMAL
UROBILINOGEN UR STRIP-ACNC: <2 EU/DL
WBC # BLD AUTO: 8.14 K/UL (ref 3.9–12.7)

## 2019-10-29 PROCEDURE — 63600175 PHARM REV CODE 636 W HCPCS: Mod: ER | Performed by: NURSE PRACTITIONER

## 2019-10-29 PROCEDURE — 99284 EMERGENCY DEPT VISIT MOD MDM: CPT | Mod: 25,ER

## 2019-10-29 PROCEDURE — 80320 DRUG SCREEN QUANTALCOHOLS: CPT | Mod: ER

## 2019-10-29 PROCEDURE — 25000003 PHARM REV CODE 250: Mod: ER | Performed by: NURSE PRACTITIONER

## 2019-10-29 PROCEDURE — 93010 EKG 12-LEAD: ICD-10-PCS | Mod: ,,, | Performed by: INTERNAL MEDICINE

## 2019-10-29 PROCEDURE — 86703 HIV-1/HIV-2 1 RESULT ANTBDY: CPT

## 2019-10-29 PROCEDURE — 93005 ELECTROCARDIOGRAM TRACING: CPT | Mod: ER

## 2019-10-29 PROCEDURE — 96375 TX/PRO/DX INJ NEW DRUG ADDON: CPT | Mod: ER

## 2019-10-29 PROCEDURE — 96361 HYDRATE IV INFUSION ADD-ON: CPT | Mod: ER

## 2019-10-29 PROCEDURE — 82977 ASSAY OF GGT: CPT

## 2019-10-29 PROCEDURE — 96372 THER/PROPH/DIAG INJ SC/IM: CPT | Mod: 59,ER

## 2019-10-29 PROCEDURE — 85025 COMPLETE CBC W/AUTO DIFF WBC: CPT | Mod: ER

## 2019-10-29 PROCEDURE — 80053 COMPREHEN METABOLIC PANEL: CPT | Mod: ER

## 2019-10-29 PROCEDURE — 81003 URINALYSIS AUTO W/O SCOPE: CPT | Mod: ER

## 2019-10-29 PROCEDURE — 96374 THER/PROPH/DIAG INJ IV PUSH: CPT | Mod: ER

## 2019-10-29 PROCEDURE — 86803 HEPATITIS C AB TEST: CPT

## 2019-10-29 PROCEDURE — 93010 ELECTROCARDIOGRAM REPORT: CPT | Mod: ,,, | Performed by: INTERNAL MEDICINE

## 2019-10-29 RX ORDER — ONDANSETRON 4 MG/1
4 TABLET, ORALLY DISINTEGRATING ORAL EVERY 8 HOURS PRN
Qty: 20 TABLET | Refills: 0 | Status: SHIPPED | OUTPATIENT
Start: 2019-10-29 | End: 2020-03-26 | Stop reason: ALTCHOICE

## 2019-10-29 RX ORDER — POTASSIUM CHLORIDE 20 MEQ/1
40 TABLET, EXTENDED RELEASE ORAL
Status: COMPLETED | OUTPATIENT
Start: 2019-10-29 | End: 2019-10-29

## 2019-10-29 RX ORDER — THIAMINE HYDROCHLORIDE 100 MG/ML
100 INJECTION, SOLUTION INTRAMUSCULAR; INTRAVENOUS
Status: COMPLETED | OUTPATIENT
Start: 2019-10-29 | End: 2019-10-29

## 2019-10-29 RX ORDER — NALTREXONE HYDROCHLORIDE 50 MG/1
50 TABLET, FILM COATED ORAL NIGHTLY
Refills: 5 | COMMUNITY
Start: 2019-08-19

## 2019-10-29 RX ORDER — LORAZEPAM 1 MG/1
1 TABLET ORAL 2 TIMES DAILY PRN
Qty: 10 TABLET | Refills: 0 | Status: SHIPPED | OUTPATIENT
Start: 2019-10-29 | End: 2020-07-27 | Stop reason: SDUPTHER

## 2019-10-29 RX ORDER — ONDANSETRON 2 MG/ML
4 INJECTION INTRAMUSCULAR; INTRAVENOUS
Status: COMPLETED | OUTPATIENT
Start: 2019-10-29 | End: 2019-10-29

## 2019-10-29 RX ADMIN — THIAMINE HYDROCHLORIDE 100 MG: 100 INJECTION, SOLUTION INTRAMUSCULAR; INTRAVENOUS at 09:10

## 2019-10-29 RX ADMIN — POTASSIUM CHLORIDE 40 MEQ: 1500 TABLET, EXTENDED RELEASE ORAL at 09:10

## 2019-10-29 RX ADMIN — ONDANSETRON 4 MG: 2 INJECTION INTRAMUSCULAR; INTRAVENOUS at 07:10

## 2019-10-29 RX ADMIN — SODIUM CHLORIDE 1000 ML: 0.9 INJECTION, SOLUTION INTRAVENOUS at 07:10

## 2019-10-29 RX ADMIN — LORAZEPAM 1 MG: 2 INJECTION INTRAMUSCULAR; INTRAVENOUS at 07:10

## 2019-10-30 LAB
GGT SERPL-CCNC: 66 U/L (ref 8–55)
HCV AB SERPL QL IA: NEGATIVE
HIV 1+2 AB+HIV1 P24 AG SERPL QL IA: NEGATIVE

## 2019-10-30 NOTE — ED PROVIDER NOTES
"Encounter Date: 10/29/2019       History     Chief Complaint   Patient presents with    Nausea     pt drank 4 days in a row, Saturday he quit drinking and has felt nausea and dizziness since. "I just feel real nervous"      The history is provided by the patient.   Drug / Alcohol Assessment   Primary symptoms include weakness.  Primary symptoms include no confusion, no somnolence, no loss of consciousness, no seizures, no agitation, no delusions, no hallucinations, no self-injury, no violence, and no intoxication. This is a chronic problem. The current episode started several days ago (since Saturday - reports that he quit drinking on Saturday). The problem has been gradually worsening. Suspected agents include alcohol. Associated symptoms include nausea and vomiting. Pertinent negatives include no fever, no injury, no bladder incontinence and no bowel incontinence. Associated medical issues include withdrawal syndrome.   Mr. Carson presents to the emergency department with complaints of nausea, vomiting, dizziness, tremors, anxiety, and intermittent, generalized abdominal discomfort.  He reports that his symptoms began on Saturday after he "drank for four days in a row".  He reports that he consumed "lots of Dutch Mist" whiskey.  He states that he quit drinking on Saturday and that is when his symptoms began.  He also reports that he feels "dehydrated" and also has a decreased appetite.  He denies any further complaints or concerns.       PCP:    Tony Paul DO        Review of patient's allergies indicates:  No Known Allergies  Past Medical History:   Diagnosis Date    Alcohol abuse     Alcohol dependence     Anxiety     Depression     History of suicidal ideation      History reviewed. No pertinent surgical history.  History reviewed. No pertinent family history.  Social History     Tobacco Use    Smoking status: Current Every Day Smoker     Packs/day: 1.00     Types: Cigarettes    Smokeless " tobacco: Never Used   Substance Use Topics    Alcohol use: Yes     Comment: 1/5 a day. hasn't drank since 10/26/19    Drug use: No     Review of Systems   Constitutional: Positive for appetite change (decreased). Negative for fever.   HENT: Negative for congestion, postnasal drip and sore throat.    Eyes: Negative for photophobia and visual disturbance.   Respiratory: Negative for cough, chest tightness, shortness of breath and wheezing.    Cardiovascular: Negative for chest pain and palpitations.   Gastrointestinal: Positive for abdominal pain, nausea and vomiting. Negative for abdominal distention, bowel incontinence and diarrhea.   Genitourinary: Negative for bladder incontinence and dysuria.   Musculoskeletal: Negative for back pain, myalgias and neck pain.   Skin: Negative for color change, rash and wound.   Neurological: Positive for dizziness, tremors, weakness and headaches. Negative for seizures, loss of consciousness, speech difficulty and numbness.   Hematological: Does not bruise/bleed easily.   Psychiatric/Behavioral: Negative for agitation, confusion, dysphoric mood, hallucinations, self-injury and suicidal ideas. The patient is nervous/anxious.        Physical Exam     Initial Vitals [10/29/19 1838]   BP Pulse Resp Temp SpO2   116/72 74 16 98.4 °F (36.9 °C) 98 %      MAP       --         Physical Exam    Nursing note and vitals reviewed.  Constitutional: Vital signs are normal. He appears well-developed and well-nourished. He is cooperative. He does not appear ill. No distress.   HENT:   Head: Normocephalic and atraumatic.   Right Ear: Hearing and external ear normal.   Left Ear: Hearing and external ear normal.   Nose: Nose normal.   Mouth/Throat: Uvula is midline and oropharynx is clear and moist. Mucous membranes are dry.   Eyes: Conjunctivae, EOM and lids are normal. Pupils are equal, round, and reactive to light.   Neck: Trachea normal and normal range of motion. Neck supple.   Cardiovascular:  "Normal rate, regular rhythm, intact distal pulses and normal pulses.   Pulmonary/Chest: Effort normal and breath sounds normal. No accessory muscle usage. No respiratory distress. He has no decreased breath sounds. He has no wheezes. He has no rhonchi. He has no rales.   Abdominal: Soft. Normal appearance and bowel sounds are normal. He exhibits no distension and no mass. There is no tenderness. There is no rigidity, no rebound, no guarding and no CVA tenderness.   Musculoskeletal: Normal range of motion. He exhibits no edema.   Neurological: He is alert and oriented to person, place, and time. He has normal strength. No sensory deficit. Gait normal. GCS eye subscore is 4. GCS verbal subscore is 5. GCS motor subscore is 6.   Neurovascular intact to all extremities.    Skin: Skin is warm, dry and intact. Capillary refill takes less than 2 seconds. No rash noted.   Normal color and turgor.    Psychiatric: His speech is normal and behavior is normal. Judgment and thought content normal. His mood appears anxious. Thought content is not paranoid and not delusional. Cognition and memory are normal. He expresses no homicidal and no suicidal ideation. He expresses no suicidal plans and no homicidal plans.         ED Course   Procedures     ED ONGOING VITALS:  Vitals:    10/29/19 1838   BP: 116/72   Pulse: 74   Resp: 16   Temp: 98.4 °F (36.9 °C)   TempSrc: Oral   SpO2: 98%   Weight: 87 kg (191 lb 11 oz)   Height: 5' 9" (1.753 m)         ABNORMAL LAB VALUES:  Labs Reviewed   CBC W/ AUTO DIFFERENTIAL - Abnormal; Notable for the following components:       Result Value    RBC 4.53 (*)     Mean Corpuscular Hemoglobin 34.2 (*)     MPV 9.1 (*)     Gran% 75.7 (*)     Lymph% 15.6 (*)     All other components within normal limits   COMPREHENSIVE METABOLIC PANEL - Abnormal; Notable for the following components:    Sodium 135 (*)     Potassium 3.2 (*)     Chloride 91 (*)     CO2 31 (*)     Glucose 126 (*)     Total Bilirubin 2.1 (*)  "    AST 65 (*)     ALT 65 (*)     All other components within normal limits   URINALYSIS, REFLEX TO URINE CULTURE - Abnormal; Notable for the following components:    Color, UA Orange (*)     Protein, UA Trace (*)     Ketones, UA Trace (*)     Bilirubin (UA) 2+ (*)     All other components within normal limits    Narrative:     Preferred Collection Type->Urine, Clean Catch   GAMMA GT - Abnormal; Notable for the following components:    GGT 66 (*)     All other components within normal limits   HIV 1 / 2 ANTIBODY   HEPATITIS C ANTIBODY   ALCOHOL,MEDICAL (ETHANOL)         ALL LAB VALUES:  Results for orders placed or performed during the hospital encounter of 10/29/19   HIV 1/2 Ag/Ab (4th Gen)   Result Value Ref Range    HIV 1/2 Ag/Ab Negative Negative   Hepatitis C antibody   Result Value Ref Range    Hepatitis C Ab Negative Negative   CBC auto differential   Result Value Ref Range    WBC 8.14 3.90 - 12.70 K/uL    RBC 4.53 (L) 4.60 - 6.20 M/uL    Hemoglobin 15.5 14.0 - 18.0 g/dL    Hematocrit 43.0 40.0 - 54.0 %    Mean Corpuscular Volume 95 82 - 98 fL    Mean Corpuscular Hemoglobin 34.2 (H) 27.0 - 31.0 pg    Mean Corpuscular Hemoglobin Conc 36.0 32.0 - 36.0 g/dL    RDW 13.1 11.5 - 14.5 %    Platelets 180 150 - 350 K/uL    MPV 9.1 (L) 9.2 - 12.9 fL    Immature Granulocytes 0.5 0.0 - 0.5 %    Gran # (ANC) 6.2 1.8 - 7.7 K/uL    Immature Grans (Abs) 0.04 0.00 - 0.04 K/uL    Lymph # 1.3 1.0 - 4.8 K/uL    Mono # 0.7 0.3 - 1.0 K/uL    Eos # 0.0 0.0 - 0.5 K/uL    Baso # 0.01 0.00 - 0.20 K/uL    nRBC 0 0 /100 WBC    Gran% 75.7 (H) 38.0 - 73.0 %    Lymph% 15.6 (L) 18.0 - 48.0 %    Mono% 8.0 4.0 - 15.0 %    Eosinophil% 0.1 0.0 - 8.0 %    Basophil% 0.1 0.0 - 1.9 %    Differential Method Automated    Comprehensive metabolic panel   Result Value Ref Range    Sodium 135 (L) 136 - 145 mmol/L    Potassium 3.2 (L) 3.5 - 5.1 mmol/L    Chloride 91 (L) 95 - 110 mmol/L    CO2 31 (H) 23 - 29 mmol/L    Glucose 126 (H) 70 - 110 mg/dL    BUN,  Bld 18 8 - 23 mg/dL    Creatinine 1.1 0.5 - 1.4 mg/dL    Calcium 9.2 8.7 - 10.5 mg/dL    Total Protein 7.7 6.0 - 8.4 g/dL    Albumin 4.6 3.5 - 5.2 g/dL    Total Bilirubin 2.1 (H) 0.1 - 1.0 mg/dL    Alkaline Phosphatase 76 55 - 135 U/L    AST 65 (H) 10 - 40 U/L    ALT 65 (H) 10 - 44 U/L    Anion Gap 13 8 - 16 mmol/L    eGFR if African American >60.0 >60 mL/min/1.73 m^2    eGFR if non African American >60.0 >60 mL/min/1.73 m^2   Urinalysis, Reflex to Urine Culture Urine, Clean Catch   Result Value Ref Range    Specimen UA Urine, Clean Catch     Color, UA Orange (A) Yellow, Straw, Daily    Appearance, UA Clear Clear    pH, UA 6.0 5.0 - 8.0    Specific Gravity, UA 1.025 1.005 - 1.030    Protein, UA Trace (A) Negative    Glucose, UA Negative Negative    Ketones, UA Trace (A) Negative    Bilirubin (UA) 2+ (A) Negative    Occult Blood UA Negative Negative    Nitrite, UA Negative Negative    Urobilinogen, UA <2.0 <2.0 EU/dL    Leukocytes, UA Negative Negative   Gamma GT   Result Value Ref Range    GGT 66 (H) 8 - 55 U/L   Ethanol   Result Value Ref Range    Alcohol, Medical, Serum <10 <10 mg/dL         The above vital signs and test results, as well as nursing notes, have been reviewed by the emergency provider.       ED MEDICATIONS:  Medications   sodium chloride 0.9% bolus 1,000 mL (0 mLs Intravenous Stopped 10/29/19 2026)   lorazepam (ATIVAN) injection 1 mg (1 mg Intravenous Given 10/29/19 1922)   ondansetron injection 4 mg (4 mg Intravenous Given 10/29/19 1922)   thiamine injection 100 mg (100 mg Intramuscular Given 10/29/19 2128)   potassium chloride SA CR tablet 40 mEq (40 mEq Oral Given 10/29/19 2127)           ED EVENTS:    2000 HOURS  RE-EVALUATION:  The patient is resting comfortably and is in no acute distress. Discussed test results and notified of pending labs. Answered questions at this time.       2140 HOURS RE-EVALUATION & DISPOSITION:   Reassessment at the time of disposition demonstrates that the patient is  resting comfortably in no acute distress. Mr. Carson denies any nausea and reports that he is feeling much better.  He has remained hemodynamically stable throughout the entire ED visit and is without objective evidence for acute process requiring urgent intervention or hospitalization. I discussed test results and provided counseling to patient with regard to condition, the treatment plan, specific conditions for return, and the importance of follow up with primary care provider as soon as possible. Also reiterated to Mr. Carson that he needs to refrain from binge drinking.  Patient provided handout for community resources to help with his alcohol addiction. Answered questions at this time. The patient is stable for discharge (in the care of his son).               EKG Readings: (Independently Interpreted)   Initial Reading: No STEMI. Previous EKG: Compared with most recent EKG Previous EKG Date: 10/04/2017. Rhythm: Normal Sinus Rhythm. Heart Rate: 68. Ectopy: No Ectopy. Conduction: RBBB. Clinical Impression: Normal Sinus Rhythm with RBBB     ECG Results          EKG 12-lead (Final result)  Result time 10/30/19 21:57:44    Final result by Interface, Lab In [facility] (10/30/19 21:57:44)                 Narrative:    Test Reason : F10.239,    Vent. Rate : 068 BPM     Atrial Rate : 068 BPM     P-R Int : 140 ms          QRS Dur : 096 ms      QT Int : 416 ms       P-R-T Axes : 069 070 055 degrees     QTc Int : 442 ms    Normal sinus rhythm  Incomplete right bundle branch block  Borderline Abnormal ECG  When compared with ECG of 04-OCT-2017 05:40,  No significant change was found  Confirmed by RUSTY ROGERS, MARY ([id number]) on 10/30/2019 9:57:34 PM    Referred By: WILY   SELF           Confirmed By:MARY OJEDA MD                                 Medical Decision Making:   History:   Old Records Summarized: records from clinic visits.  Clinical Tests:   Lab Tests: Ordered and Reviewed  Medical Tests: Ordered and  Reviewed  Other:   I have discussed this case with another health care provider.       <> Summary of the Discussion:   Attending Physician:  Tony Alamo MD    Additional MDM:   EKG: I have independently interpreted EKG(s) - see notes.                    Clinical Impression:       ICD-10-CM ICD-9-CM   1. Non-intractable vomiting with nausea, unspecified vomiting type R11.2 787.01   2. Withdrawal symptoms, alcohol F10.239 291.81   3. Alcoholism F10.20 303.90   4. Hypokalemia E87.6 276.8   5. Hypochloremia E87.8 276.9   6. Alcohol dependence, binge pattern F10.20 303.92           Disposition:   Disposition: Discharged  Condition: Stable  I discussed with patient that the evaluation in the emergency department does not suggest any emergent or life threatening medical condition requiring immediate intervention beyond what was provided in the ED, and I believe patient is safe for discharge.  Regardless, an unremarkable evaluation in the ED does not preclude the development or presence of a serious of life threatening condition. As such, patient was instructed to return immediately for any worsening or change in current symptoms. I also discussed the results of my evaluation and diagnostics with patient and he concurs with the evaluation and management plan.  Detailed written and verbal instructions provided to patient and he expressed a verbal understanding of the discharge instructions and overall management plan. Reiterated the importance of medication administration and safety and advised patient to follow up with primary care provider in 3-5 days or sooner if needed.  Also advised patient to return to the ER for any complications.     Regarding HYPOKALEMIA, I educated patient on the importance of potassium and explained that potassium helps with the function of nerve and muscle cells, including those in the heart. I informed patient that low level of potassium in the blood can cause abnormal heart rhythms and even  heart attack. I instructed patient to eat more potassium-rich foods such as: bananas; oranges and orange juice; tomatoes, tomato sauce, and tomato juice; green, leafy vegetables - including spinach, salad greens, collards, and chard; melons (all kinds); peas; beans; potatoes; sweet potatoes; avocados, including guacamole; vegetable juices; fruit juices; and all nuts and seeds. Also instructed patient to avoid food that are high in salt and to have potassium levels checked regularly by primary care provider.  Advised patient to contact primary care provider for any of the following symptoms: vomiting, fatigue, diarrhea,  rapid (or irregular) heartbeat, shortness of breath, chest pain, muscle cramps (including spasms or twitching), weakness, and paralysis.    For NAUSEA/VOMITING, I advised patient on importance of staying well hydrated; eating frequent, small amounts of clear liquids; avoid solid foods until there has been no vomiting for six hours, and then work slowly back to a normal diet; and to use an OTC bismuth stomach remedy for upset stomach, nausea, indigestion, and diarrhea. We discussed signs and symptoms of dehydration and possible causes of N/V with patient (viral infections, medications, migraine headaches, food poisoning, allergies, and peptic ulcer disease).  Reiterated the importance of following up with primary care provider if no improvement is noted within 72 hours.     Regarding DRUG ADDICTION, the patient was advised that:  an overdose of controlled substances, illegal drugs, or narcotics, can lead to a coma and death; HIV, AIDS, and hepatitis B and C are serious diseases that can be spread through needles, syringes, and other supplies used to inject narcotics; one may also get an infection if injecting narcotics using dirty needles or supplies; illegal narcotics may be mixed with things like talcum powder, baking soda, or poisons that may get stuck in veins and cause infections or clots that  may travel to the heart, lungs, or brain and cause death; snorting or sniffing heroin may cause a hole to develop in the cartilage that separates the two sides of the nose; using pure heroin  increases the risks of an overdose and death; and black tar heroin may contain the bacteria that causes botulism which can be life-threatening.         Discharge Medication List as of 10/29/2019  9:37 PM   Refills for Ativan 1 mg and Zofran 4 mg ODT provided.          Follow-up Information     Schedule an appointment as soon as possible for a visit  with Tony Paul DO.    Specialty:  Family Medicine  Contact information:  [address] [address] [address]  [phone]                                Juan Carlos Smallwood NP  10/31/19 1124

## 2020-02-09 ENCOUNTER — HOSPITAL ENCOUNTER (EMERGENCY)
Facility: HOSPITAL | Age: 65
Discharge: HOME | End: 2020-02-09
Attending: EMERGENCY MEDICINE
Payer: COMMERCIAL

## 2020-02-09 VITALS
RESPIRATION RATE: 16 BRPM | OXYGEN SATURATION: 96 % | DIASTOLIC BLOOD PRESSURE: 98 MMHG | WEIGHT: 194 LBS | SYSTOLIC BLOOD PRESSURE: 171 MMHG | TEMPERATURE: 98 F | BODY MASS INDEX: 28.65 KG/M2 | HEART RATE: 78 BPM

## 2020-02-09 DIAGNOSIS — R20.2 PARESTHESIAS: ICD-10-CM

## 2020-02-09 DIAGNOSIS — R74.01 ELEVATED TRANSAMINASE LEVEL: Primary | ICD-10-CM

## 2020-02-09 DIAGNOSIS — R16.0 HEPATOMEGALY: ICD-10-CM

## 2020-02-09 DIAGNOSIS — R17 ELEVATED BILIRUBIN: ICD-10-CM

## 2020-02-09 DIAGNOSIS — R42 DIZZINESS: ICD-10-CM

## 2020-02-09 LAB
ALBUMIN SERPL BCP-MCNC: 4.5 G/DL (ref 3.5–5.2)
ALP SERPL-CCNC: 65 U/L (ref 55–135)
ALT SERPL W/O P-5'-P-CCNC: 70 U/L (ref 10–44)
ANION GAP SERPL CALC-SCNC: 11 MMOL/L (ref 8–16)
AST SERPL-CCNC: 48 U/L (ref 10–40)
BASOPHILS # BLD AUTO: 0.02 K/UL (ref 0–0.2)
BASOPHILS NFR BLD: 0.3 % (ref 0–1.9)
BILIRUB SERPL-MCNC: 1.4 MG/DL (ref 0.1–1)
BILIRUB UR QL STRIP: NEGATIVE
BNP SERPL-MCNC: 34 PG/ML (ref 0–99)
BUN SERPL-MCNC: 11 MG/DL (ref 8–23)
CALCIUM SERPL-MCNC: 9.3 MG/DL (ref 8.7–10.5)
CHLORIDE SERPL-SCNC: 101 MMOL/L (ref 95–110)
CLARITY UR REFRACT.AUTO: CLEAR
CO2 SERPL-SCNC: 25 MMOL/L (ref 23–29)
COLOR UR AUTO: YELLOW
CREAT SERPL-MCNC: 1.1 MG/DL (ref 0.5–1.4)
DIFFERENTIAL METHOD: ABNORMAL
EOSINOPHIL # BLD AUTO: 0 K/UL (ref 0–0.5)
EOSINOPHIL NFR BLD: 0.2 % (ref 0–8)
ERYTHROCYTE [DISTWIDTH] IN BLOOD BY AUTOMATED COUNT: 13.3 % (ref 11.5–14.5)
EST. GFR  (AFRICAN AMERICAN): >60 ML/MIN/1.73 M^2
EST. GFR  (NON AFRICAN AMERICAN): >60 ML/MIN/1.73 M^2
GLUCOSE SERPL-MCNC: 107 MG/DL (ref 70–110)
GLUCOSE UR QL STRIP: NEGATIVE
HCT VFR BLD AUTO: 42.3 % (ref 40–54)
HGB BLD-MCNC: 15.1 G/DL (ref 14–18)
HGB UR QL STRIP: NEGATIVE
IMM GRANULOCYTES # BLD AUTO: 0.01 K/UL (ref 0–0.04)
IMM GRANULOCYTES NFR BLD AUTO: 0.2 % (ref 0–0.5)
KETONES UR QL STRIP: ABNORMAL
LACTATE SERPL-SCNC: 1.4 MMOL/L (ref 0.5–2.2)
LEUKOCYTE ESTERASE UR QL STRIP: NEGATIVE
LYMPHOCYTES # BLD AUTO: 1.1 K/UL (ref 1–4.8)
LYMPHOCYTES NFR BLD: 19.1 % (ref 18–48)
MAGNESIUM SERPL-MCNC: 1.8 MG/DL (ref 1.6–2.6)
MCH RBC QN AUTO: 34.8 PG (ref 27–31)
MCHC RBC AUTO-ENTMCNC: 35.7 G/DL (ref 32–36)
MCV RBC AUTO: 98 FL (ref 82–98)
MONOCYTES # BLD AUTO: 0.6 K/UL (ref 0.3–1)
MONOCYTES NFR BLD: 10.8 % (ref 4–15)
NEUTROPHILS # BLD AUTO: 4.1 K/UL (ref 1.8–7.7)
NEUTROPHILS NFR BLD: 69.4 % (ref 38–73)
NITRITE UR QL STRIP: NEGATIVE
NRBC BLD-RTO: 0 /100 WBC
PH UR STRIP: 7 [PH] (ref 5–8)
PLATELET # BLD AUTO: 150 K/UL (ref 150–350)
PMV BLD AUTO: 8.5 FL (ref 9.2–12.9)
POTASSIUM SERPL-SCNC: 3.9 MMOL/L (ref 3.5–5.1)
PROT SERPL-MCNC: 7.4 G/DL (ref 6–8.4)
PROT UR QL STRIP: ABNORMAL
RBC # BLD AUTO: 4.34 M/UL (ref 4.6–6.2)
SODIUM SERPL-SCNC: 137 MMOL/L (ref 136–145)
SP GR UR STRIP: 1.02 (ref 1–1.03)
TROPONIN I SERPL DL<=0.01 NG/ML-MCNC: 0.01 NG/ML (ref 0–0.03)
URN SPEC COLLECT METH UR: ABNORMAL
UROBILINOGEN UR STRIP-ACNC: <2 EU/DL
WBC # BLD AUTO: 5.86 K/UL (ref 3.9–12.7)

## 2020-02-09 PROCEDURE — 96361 HYDRATE IV INFUSION ADD-ON: CPT | Mod: ER

## 2020-02-09 PROCEDURE — 96375 TX/PRO/DX INJ NEW DRUG ADDON: CPT | Mod: ER

## 2020-02-09 PROCEDURE — 83735 ASSAY OF MAGNESIUM: CPT | Mod: ER

## 2020-02-09 PROCEDURE — 99285 EMERGENCY DEPT VISIT HI MDM: CPT | Mod: 25,ER

## 2020-02-09 PROCEDURE — 63600175 PHARM REV CODE 636 W HCPCS: Mod: ER | Performed by: EMERGENCY MEDICINE

## 2020-02-09 PROCEDURE — 80053 COMPREHEN METABOLIC PANEL: CPT | Mod: ER

## 2020-02-09 PROCEDURE — 83605 ASSAY OF LACTIC ACID: CPT | Mod: ER

## 2020-02-09 PROCEDURE — 93010 ELECTROCARDIOGRAM REPORT: CPT | Mod: ,,, | Performed by: INTERNAL MEDICINE

## 2020-02-09 PROCEDURE — 83880 ASSAY OF NATRIURETIC PEPTIDE: CPT | Mod: ER

## 2020-02-09 PROCEDURE — 93005 ELECTROCARDIOGRAM TRACING: CPT | Mod: ER

## 2020-02-09 PROCEDURE — 93010 EKG 12-LEAD: ICD-10-PCS | Mod: ,,, | Performed by: INTERNAL MEDICINE

## 2020-02-09 PROCEDURE — 84484 ASSAY OF TROPONIN QUANT: CPT | Mod: ER

## 2020-02-09 PROCEDURE — 81003 URINALYSIS AUTO W/O SCOPE: CPT | Mod: ER

## 2020-02-09 PROCEDURE — 96372 THER/PROPH/DIAG INJ SC/IM: CPT | Mod: 59,ER

## 2020-02-09 PROCEDURE — 85025 COMPLETE CBC W/AUTO DIFF WBC: CPT | Mod: ER

## 2020-02-09 PROCEDURE — 25500020 PHARM REV CODE 255: Mod: ER | Performed by: EMERGENCY MEDICINE

## 2020-02-09 PROCEDURE — 96374 THER/PROPH/DIAG INJ IV PUSH: CPT | Mod: ER

## 2020-02-09 RX ORDER — ONDANSETRON 2 MG/ML
4 INJECTION INTRAMUSCULAR; INTRAVENOUS
Status: COMPLETED | OUTPATIENT
Start: 2020-02-09 | End: 2020-02-09

## 2020-02-09 RX ORDER — THIAMINE HYDROCHLORIDE 100 MG/ML
100 INJECTION, SOLUTION INTRAMUSCULAR; INTRAVENOUS
Status: COMPLETED | OUTPATIENT
Start: 2020-02-09 | End: 2020-02-09

## 2020-02-09 RX ADMIN — IOHEXOL 75 ML: 350 INJECTION, SOLUTION INTRAVENOUS at 07:02

## 2020-02-09 RX ADMIN — THIAMINE HYDROCHLORIDE 100 MG: 100 INJECTION, SOLUTION INTRAMUSCULAR; INTRAVENOUS at 07:02

## 2020-02-09 RX ADMIN — SODIUM CHLORIDE, SODIUM LACTATE, POTASSIUM CHLORIDE, AND CALCIUM CHLORIDE 1000 ML: .6; .31; .03; .02 INJECTION, SOLUTION INTRAVENOUS at 07:02

## 2020-02-09 RX ADMIN — ONDANSETRON 4 MG: 2 INJECTION INTRAMUSCULAR; INTRAVENOUS at 07:02

## 2020-02-09 RX ADMIN — LORAZEPAM 1 MG: 2 INJECTION INTRAMUSCULAR; INTRAVENOUS at 09:02

## 2020-02-12 ENCOUNTER — LAB VISIT (OUTPATIENT)
Dept: LAB | Facility: HOSPITAL | Age: 65
End: 2020-02-12
Attending: INTERNAL MEDICINE
Payer: COMMERCIAL

## 2020-02-12 ENCOUNTER — OFFICE VISIT (OUTPATIENT)
Dept: GASTROENTEROLOGY | Facility: CLINIC | Age: 65
End: 2020-02-12
Payer: COMMERCIAL

## 2020-02-12 VITALS
WEIGHT: 194.44 LBS | HEIGHT: 69 IN | SYSTOLIC BLOOD PRESSURE: 122 MMHG | HEART RATE: 74 BPM | BODY MASS INDEX: 28.8 KG/M2 | DIASTOLIC BLOOD PRESSURE: 64 MMHG

## 2020-02-12 DIAGNOSIS — R79.89 ABNORMAL LFTS: Primary | ICD-10-CM

## 2020-02-12 DIAGNOSIS — K70.9 ALCOHOLIC LIVER DISEASE: ICD-10-CM

## 2020-02-12 DIAGNOSIS — R79.89 ABNORMAL LFTS: ICD-10-CM

## 2020-02-12 PROCEDURE — 86706 HEP B SURFACE ANTIBODY: CPT

## 2020-02-12 PROCEDURE — 99214 OFFICE O/P EST MOD 30 MIN: CPT | Mod: S$GLB,,, | Performed by: INTERNAL MEDICINE

## 2020-02-12 PROCEDURE — 86235 NUCLEAR ANTIGEN ANTIBODY: CPT | Mod: 91

## 2020-02-12 PROCEDURE — 3008F PR BODY MASS INDEX (BMI) DOCUMENTED: ICD-10-PCS | Mod: CPTII,S$GLB,, | Performed by: INTERNAL MEDICINE

## 2020-02-12 PROCEDURE — 82728 ASSAY OF FERRITIN: CPT

## 2020-02-12 PROCEDURE — 83540 ASSAY OF IRON: CPT

## 2020-02-12 PROCEDURE — 99999 PR PBB SHADOW E&M-EST. PATIENT-LVL III: CPT | Mod: PBBFAC,,, | Performed by: INTERNAL MEDICINE

## 2020-02-12 PROCEDURE — 82390 ASSAY OF CERULOPLASMIN: CPT

## 2020-02-12 PROCEDURE — 99214 PR OFFICE/OUTPT VISIT, EST, LEVL IV, 30-39 MIN: ICD-10-PCS | Mod: S$GLB,,, | Performed by: INTERNAL MEDICINE

## 2020-02-12 PROCEDURE — 36415 COLL VENOUS BLD VENIPUNCTURE: CPT

## 2020-02-12 PROCEDURE — 86256 FLUORESCENT ANTIBODY TITER: CPT

## 2020-02-12 PROCEDURE — 82103 ALPHA-1-ANTITRYPSIN TOTAL: CPT

## 2020-02-12 PROCEDURE — 86790 VIRUS ANTIBODY NOS: CPT

## 2020-02-12 PROCEDURE — 87340 HEPATITIS B SURFACE AG IA: CPT

## 2020-02-12 PROCEDURE — 3008F BODY MASS INDEX DOCD: CPT | Mod: CPTII,S$GLB,, | Performed by: INTERNAL MEDICINE

## 2020-02-12 PROCEDURE — 86038 ANTINUCLEAR ANTIBODIES: CPT

## 2020-02-12 PROCEDURE — 99999 PR PBB SHADOW E&M-EST. PATIENT-LVL III: ICD-10-PCS | Mod: PBBFAC,,, | Performed by: INTERNAL MEDICINE

## 2020-02-12 NOTE — PROGRESS NOTES
Subjective:     Barb Carson is here for evaluation of Elevated Hepatic Enzymes (Referred from Dr. Tony Alamo) and Hepatomegaly      HPI  Barb Carson is here for evaluation of abnormal LFTs and fatty liver.  Patient in knowledge is significant alcohol use over many years.  He has been cutting back in his ultimately getting off alcohol.  He has had an AST higher than ALT in the past.  Recently his ALT is higher than the AST.  His bilirubin has also been up in the past.  He denies any issues of liver decompensation such as ascites, gastrointestinal bleeding or encephalopathy.    His main complaints seem to be related to back pain and foot numbness.      CT 2/2020  Impression       No acute findings.  Normal appendix.    Hepatomegaly with fatty infiltration of liver.         Review of Systems   Constitutional: Negative.    HENT: Negative.    Eyes: Negative.    Respiratory: Negative.    Cardiovascular: Negative.    Gastrointestinal: Negative.    Genitourinary: Negative.    Musculoskeletal: Positive for arthralgias, back pain and myalgias.   Skin: Negative.    Neurological: Positive for numbness.   Psychiatric/Behavioral: Negative.        Objective:     Physical Exam   Constitutional: He is oriented to person, place, and time. He appears well-developed and well-nourished. No distress.   HENT:   Head: Normocephalic and atraumatic.   Mouth/Throat: Oropharynx is clear and moist. No oropharyngeal exudate.   Eyes: Pupils are equal, round, and reactive to light. Conjunctivae are normal. Right eye exhibits no discharge. Left eye exhibits no discharge. No scleral icterus.   Pulmonary/Chest: Effort normal and breath sounds normal. No respiratory distress. He has no wheezes.   Abdominal: Soft. He exhibits no distension. There is no tenderness.   Musculoskeletal: He exhibits no edema.   Neurological: He is alert and oriented to person, place, and time.   Psychiatric: He has a normal mood and affect. His behavior is  normal.   Vitals reviewed.      Computed MELD-Na score unavailable. Necessary lab results were not found in the last year.  Computed MELD score unavailable. Necessary lab results were not found in the last year.    WBC   Date Value Ref Range Status   02/09/2020 5.86 3.90 - 12.70 K/uL Final     Hemoglobin   Date Value Ref Range Status   02/09/2020 15.1 14.0 - 18.0 g/dL Final     Hematocrit   Date Value Ref Range Status   02/09/2020 42.3 40.0 - 54.0 % Final     Platelets   Date Value Ref Range Status   02/09/2020 150 150 - 350 K/uL Final     BUN, Bld   Date Value Ref Range Status   02/09/2020 11 8 - 23 mg/dL Final     Creatinine   Date Value Ref Range Status   02/09/2020 1.1 0.5 - 1.4 mg/dL Final     Glucose   Date Value Ref Range Status   02/09/2020 107 70 - 110 mg/dL Final     Calcium   Date Value Ref Range Status   02/09/2020 9.3 8.7 - 10.5 mg/dL Final     Sodium   Date Value Ref Range Status   02/09/2020 137 136 - 145 mmol/L Final     Potassium   Date Value Ref Range Status   02/09/2020 3.9 3.5 - 5.1 mmol/L Final     Chloride   Date Value Ref Range Status   02/09/2020 101 95 - 110 mmol/L Final     Magnesium   Date Value Ref Range Status   02/09/2020 1.8 1.6 - 2.6 mg/dL Final     AST   Date Value Ref Range Status   02/09/2020 48 (H) 10 - 40 U/L Final     ALT   Date Value Ref Range Status   02/09/2020 70 (H) 10 - 44 U/L Final     Alkaline Phosphatase   Date Value Ref Range Status   02/09/2020 65 55 - 135 U/L Final     Total Bilirubin   Date Value Ref Range Status   02/09/2020 1.4 (H) 0.1 - 1.0 mg/dL Final     Comment:     For infants and newborns, interpretation of results should be based  on gestational age, weight and in agreement with clinical  observations.  Premature Infant recommended reference ranges:  Up to 24 hours.............<8.0 mg/dL  Up to 48 hours............<12.0 mg/dL  3-5 days..................<15.0 mg/dL  6-29 days.................<15.0 mg/dL       Albumin   Date Value Ref Range Status    02/09/2020 4.5 3.5 - 5.2 g/dL Final     INR   Date Value Ref Range Status   06/02/2017 1.0 0.8 - 1.2 Final     Comment:     Coumadin Therapy:  2.0 - 3.0 for INR for all indicators except mechanical heart valves  and antiphospholipid syndromes which should use 2.5 - 3.5.           Assessment/Plan:     1. Abnormal LFTs    2. Alcoholic liver disease      Barb Carson is a 64 y.o. male withElevated Hepatic Enzymes (Referred from Dr. Tony Alamo) and Hepatomegaly      Abnormal LFTs-suspect related to alcohol but will complete abnormal LFT evaluation  -     Hepatitis B surface antibody; Future; Expected date: 02/12/2020  -     Hepatitis B surface antigen; Future; Expected date: 02/12/2020  -     Hepatitis A antibody, IgG; Future; Expected date: 02/12/2020  -     Anti-smooth muscle antibody; Future; Expected date: 02/12/2020  -     YEE Screen w/Reflex; Future; Expected date: 02/12/2020  -     Iron and TIBC; Future; Expected date: 02/12/2020  -     Ferritin; Future; Expected date: 02/12/2020  -     Ceruloplasmin; Future; Expected date: 02/12/2020  -     Antimitochondrial antibody; Future; Expected date: 02/12/2020  -     Alpha-1-antitrypsin; Future; Expected date: 02/12/2020  -     US Elastography Liver; Future; Expected date: 02/12/2020  -     Comprehensive metabolic panel; Future; Expected date: 02/12/2020  -     CBC auto differential; Future; Expected date: 02/12/2020  -     AFP tumor marker; Future; Expected date: 02/12/2020  -     Protime-INR; Future; Expected date: 02/12/2020  -     US Abdomen Limited; Future; Expected date: 02/12/2020    Alcoholic liver disease-concern patient may have more advanced liver disease and possibly cirrhosis.  -will evaluate further with fibroscan  -encourage alcohol abstinence but advised has to be careful if he does have withdrawal symptoms  -     US Elastography Liver; Future; Expected date: 02/12/2020  -     Comprehensive metabolic panel; Future; Expected date: 02/12/2020  -      CBC auto differential; Future; Expected date: 02/12/2020  -     AFP tumor marker; Future; Expected date: 02/12/2020  -     Protime-INR; Future; Expected date: 02/12/2020  -     US Abdomen Limited; Future; Expected date: 02/12/2020    Return to clinic in 6 months with preclinic labs and imaging    Nancy Truong MD

## 2020-02-13 LAB
A1AT SERPL-MCNC: 67 MG/DL (ref 100–190)
CERULOPLASMIN SERPL-MCNC: 20 MG/DL (ref 15–45)
FERRITIN SERPL-MCNC: 426 NG/ML (ref 20–300)
HBV SURFACE AB SER-ACNC: NEGATIVE M[IU]/ML
HBV SURFACE AG SERPL QL IA: NEGATIVE
HEPATITIS A ANTIBODY, IGG: NEGATIVE
IRON SERPL-MCNC: 201 UG/DL (ref 45–160)
SATURATED IRON: 63 % (ref 20–50)
TOTAL IRON BINDING CAPACITY: 321 UG/DL (ref 250–450)
TRANSFERRIN SERPL-MCNC: 217 MG/DL (ref 200–375)

## 2020-02-14 DIAGNOSIS — Z23 NEED FOR HEPATITIS A AND B VACCINATION: Primary | ICD-10-CM

## 2020-02-14 DIAGNOSIS — E88.01 LOW PLASMA ALPHA-1 ANTITRYPSIN: ICD-10-CM

## 2020-02-14 DIAGNOSIS — R79.89 ELEVATED FERRITIN: ICD-10-CM

## 2020-02-14 LAB
ANA SER QL IF: NORMAL
MITOCHONDRIA AB TITR SER IF: NORMAL {TITER}
SMOOTH MUSCLE AB TITR SER IF: NORMAL {TITER}

## 2020-02-18 ENCOUNTER — PROCEDURE VISIT (OUTPATIENT)
Dept: GASTROENTEROLOGY | Facility: CLINIC | Age: 65
End: 2020-02-18
Attending: INTERNAL MEDICINE
Payer: COMMERCIAL

## 2020-02-18 VITALS
DIASTOLIC BLOOD PRESSURE: 72 MMHG | SYSTOLIC BLOOD PRESSURE: 116 MMHG | BODY MASS INDEX: 29.39 KG/M2 | HEIGHT: 69 IN | WEIGHT: 198.44 LBS | HEART RATE: 70 BPM

## 2020-02-18 DIAGNOSIS — R79.89 ABNORMAL LFTS: ICD-10-CM

## 2020-02-18 DIAGNOSIS — K70.9 ALCOHOLIC LIVER DISEASE: ICD-10-CM

## 2020-02-18 PROCEDURE — 91200 PR LIVER ELASTOGRAPHY W/OUT IMAG W/INTERP & REPORT: ICD-10-PCS | Mod: S$GLB,,, | Performed by: INTERNAL MEDICINE

## 2020-02-18 PROCEDURE — 91200 LIVER ELASTOGRAPHY: CPT | Mod: S$GLB,,, | Performed by: INTERNAL MEDICINE

## 2020-02-24 NOTE — PROCEDURES
Procedures     Fibroscan Procedure     Name: Barb Carson  Date of Procedure : 2020   :: Nancy Truong MD  Diagnosis: Alcohol    Probe: XL    Fibroscan readin.1 KPa    Fibrosis:F 0-1     CAP readin dB/m    Steatosis: :S3       Interpretation:  Stage 0-1 fibrosis with grade 3 steatosis.

## 2020-02-27 ENCOUNTER — TELEPHONE (OUTPATIENT)
Dept: GASTROENTEROLOGY | Facility: CLINIC | Age: 65
End: 2020-02-27

## 2020-02-27 NOTE — TELEPHONE ENCOUNTER
Spoke with patient and gave results of lab work and fibroscan results. Patient verbalized understanding and scheduled nurse visit and labs on 03/03/2020 at [facility].

## 2020-02-27 NOTE — TELEPHONE ENCOUNTER
----- Message from Aleja Bedoya sent at 2/27/2020  3:51 PM CST -----  Contact: pt  .Type:  Test Results    Who Called:  pt  Name of Test (Lab/Mammo/Etc):  Lab/fiber scan   Date of Test:    Ordering Provider:    Where the test was performed:    Would the patient rather a call back or a response via [facility]? Call back   Best Call Back Number:  [phone] (home)   Additional Information:

## 2020-03-26 ENCOUNTER — TELEPHONE (OUTPATIENT)
Dept: INTERNAL MEDICINE | Facility: CLINIC | Age: 65
End: 2020-03-26

## 2020-03-26 ENCOUNTER — OFFICE VISIT (OUTPATIENT)
Dept: INTERNAL MEDICINE | Facility: CLINIC | Age: 65
End: 2020-03-26
Payer: COMMERCIAL

## 2020-03-26 VITALS — TEMPERATURE: 98 F

## 2020-03-26 DIAGNOSIS — J01.90 SUBACUTE SINUSITIS, UNSPECIFIED LOCATION: Primary | ICD-10-CM

## 2020-03-26 DIAGNOSIS — R50.9 FEVER, UNSPECIFIED FEVER CAUSE: ICD-10-CM

## 2020-03-26 DIAGNOSIS — R68.83 CHILLS: ICD-10-CM

## 2020-03-26 LAB
CTP QC/QA: YES
CTP QC/QA: YES
POC MOLECULAR INFLUENZA A AGN: NEGATIVE
POC MOLECULAR INFLUENZA B AGN: NEGATIVE
S PYO RRNA THROAT QL PROBE: NEGATIVE

## 2020-03-26 PROCEDURE — 99999 PR PBB SHADOW E&M-EST. PATIENT-LVL III: ICD-10-PCS | Mod: PBBFAC,,, | Performed by: NURSE PRACTITIONER

## 2020-03-26 PROCEDURE — 87880 POCT RAPID STREP A: ICD-10-PCS | Mod: QW,S$GLB,, | Performed by: NURSE PRACTITIONER

## 2020-03-26 PROCEDURE — 87502 POCT INFLUENZA A/B MOLECULAR: ICD-10-PCS | Mod: QW,S$GLB,, | Performed by: NURSE PRACTITIONER

## 2020-03-26 PROCEDURE — 99999 PR PBB SHADOW E&M-EST. PATIENT-LVL III: CPT | Mod: PBBFAC,,, | Performed by: NURSE PRACTITIONER

## 2020-03-26 PROCEDURE — 87880 STREP A ASSAY W/OPTIC: CPT | Mod: QW,S$GLB,, | Performed by: NURSE PRACTITIONER

## 2020-03-26 PROCEDURE — 99213 PR OFFICE/OUTPT VISIT, EST, LEVL III, 20-29 MIN: ICD-10-PCS | Mod: S$GLB,,, | Performed by: NURSE PRACTITIONER

## 2020-03-26 PROCEDURE — 99213 OFFICE O/P EST LOW 20 MIN: CPT | Mod: S$GLB,,, | Performed by: NURSE PRACTITIONER

## 2020-03-26 PROCEDURE — 87502 INFLUENZA DNA AMP PROBE: CPT | Mod: QW,S$GLB,, | Performed by: NURSE PRACTITIONER

## 2020-03-26 RX ORDER — CETIRIZINE HYDROCHLORIDE 10 MG/1
10 TABLET ORAL DAILY
Qty: 30 TABLET | Refills: 0 | Status: SHIPPED | OUTPATIENT
Start: 2020-03-26

## 2020-03-26 RX ORDER — AZITHROMYCIN 250 MG/1
TABLET, FILM COATED ORAL
Qty: 6 TABLET | Refills: 0 | Status: SHIPPED | OUTPATIENT
Start: 2020-03-26 | End: 2020-05-02 | Stop reason: CLARIF

## 2020-03-26 RX ORDER — FLUTICASONE PROPIONATE 50 MCG
2 SPRAY, SUSPENSION (ML) NASAL DAILY
Qty: 16 G | Refills: 0 | Status: SHIPPED | OUTPATIENT
Start: 2020-03-26

## 2020-03-26 NOTE — TELEPHONE ENCOUNTER
Returned call to patient. He states he has no thermometer to check temp. His symptoms: mucous/flem, cough, chills, weakness. Using mucinex no help.     Scheduled pt appt today but advised to stay in car for check in, if he has fever at the door he will not be let in but sent to [facility] for Covid testing.

## 2020-03-26 NOTE — PROGRESS NOTES
Subjective:       Patient ID: Barb Carson is a 64 y.o. male.    Chief Complaint: No chief complaint on file.    Mr Carson is here today for complaints of cough, nasal congestion, and chills. This has been intermittent for approx 3 weeks. He has been unable to take his temperature since he does not have a thermometer, but felt like he had a fever. He was unable to do virtual visit due to not having smart phone or being on pt portal. Denies any sick contacts, recent travels, or known contact with anyone who has been traveling recently. Swabbed pt for flu and strep in parking lot to prevent pt from coming into office and possibly exposing others or being exposed if symptoms were possibly consistent with COVID. Negative flu and strep swab in office. Pt was afebrile.     Cough   This is a new problem. The current episode started 1 to 4 weeks ago (approx 3 weeks). The problem has been unchanged. The cough is productive of sputum. Associated symptoms include chills, a fever (subjective), nasal congestion, postnasal drip, shortness of breath (intermittent) and wheezing. Pertinent negatives include no chest pain, ear congestion, ear pain, headaches, myalgias, rhinorrhea, sore throat or sweats. The symptoms are aggravated by lying down (at night). Risk factors for lung disease include smoking/tobacco exposure. Treatments tried: mucinex, NSAIDs. The treatment provided mild relief. There is no history of asthma, bronchitis, COPD, environmental allergies or pneumonia.       Patient Active Problem List   Diagnosis    Depression    Tobacco use    Colitis    Pancytopenia    Anxiety    Alcohol dependence, binge pattern    Subacute sinusitis       History reviewed. No pertinent family history.  History reviewed. No pertinent surgical history.      Current Outpatient Medications:     buPROPion (WELLBUTRIN XL) 300 MG 24 hr tablet, Take 1 tablet (300 mg total) by mouth once daily., Disp: 30 tablet, Rfl: 1    folic acid  (FOLVITE) 1 MG tablet, TAKE ONE TABLET BY MOUTH AS NEEDED AS DIRECTED, Disp: 30 tablet, Rfl: 2    LORazepam (ATIVAN) 1 MG tablet, Take 1 tablet (1 mg total) by mouth 2 (two) times daily as needed (Anxiety/Tremors)., Disp: 10 tablet, Rfl: 0    multivitamin capsule, Take 1 capsule by mouth once daily., Disp: , Rfl:     naltrexone (DEPADE) 50 mg tablet, Take 50 mg by mouth nightly. at bedtime., Disp: , Rfl: 5    azithromycin (Z-RICHARD) 250 MG tablet, Take 2 tablets by mouth on day 1; Take 1 tablet by mouth on days 2-5, Disp: 6 tablet, Rfl: 0    cetirizine (ZYRTEC) 10 MG tablet, Take 1 tablet (10 mg total) by mouth once daily., Disp: 30 tablet, Rfl: 0    fluticasone propionate (FLONASE) 50 mcg/actuation nasal spray, 2 sprays (100 mcg total) by Each Nostril route once daily., Disp: 16 g, Rfl: 0    ibuprofen (ADVIL,MOTRIN) 200 MG tablet, Take 200 mg by mouth., Disp: , Rfl:     Review of Systems   Constitutional: Positive for chills, fatigue and fever (subjective). Negative for appetite change.   HENT: Positive for congestion, postnasal drip and sinus pressure (intermittent). Negative for ear pain, rhinorrhea, sinus pain, sore throat and trouble swallowing.    Respiratory: Positive for cough, shortness of breath (intermittent) and wheezing. Negative for chest tightness.    Cardiovascular: Negative for chest pain and palpitations.   Gastrointestinal: Negative for abdominal pain, diarrhea, nausea and vomiting.   Musculoskeletal: Negative for myalgias.   Allergic/Immunologic: Negative for environmental allergies.   Neurological: Positive for light-headedness (with coughing spell). Negative for dizziness, numbness and headaches.       Objective:   Temp 98.1 °F (36.7 °C)      Physical Exam   Constitutional: He is oriented to person, place, and time. He appears well-developed and well-nourished. He is cooperative. He does not appear ill. No distress.   HENT:   Head: Normocephalic and atraumatic.   Nose: Right sinus exhibits  no maxillary sinus tenderness and no frontal sinus tenderness (when asked pt to palpate area of sinuses). Left sinus exhibits no maxillary sinus tenderness and no frontal sinus tenderness.   Neck: Normal range of motion. Neck supple.   Lymphadenopathy:     He has no cervical adenopathy (per pt when asked to palpate area of cervical glands).   Neurological: He is alert and oriented to person, place, and time.   Skin: Skin is warm and dry. He is not diaphoretic.       Assessment & Plan     Problem List Items Addressed This Visit        ENT    Subacute sinusitis - Primary    Current Assessment & Plan     Will treat pt for sinusitis since symptoms have been persistent for greater than 2 weeks. Pt requesting steroid injection, but discouraged this since the current recommendations for steroid use is not recommended due to increase in adverse effects with pt's with coronavirus. COVID 19 not suspected at this time, but discussed with pt the s/s to be aware of for COVID 19, as well as precautions to take such as social distancing, hand hygiene, and covering mouth/nose with sneezes and coughs. Recommended staying home as much as possible. Constitutional aspect of physical exam was performed by myself when AVS was brought to pt in and discussed with pt in parking lot of clinic.          Relevant Medications    cetirizine (ZYRTEC) 10 MG tablet    fluticasone propionate (FLONASE) 50 mcg/actuation nasal spray    azithromycin (Z-RICHARD) 250 MG tablet      Other Visit Diagnoses     Chills        Relevant Orders    POCT Influenza A/B Molecular (Completed)    Fever, unspecified fever cause        Relevant Orders    POCT Rapid Strep A (Completed)        Follow up if symptoms worsen or fail to improve.

## 2020-03-26 NOTE — TELEPHONE ENCOUNTER
----- Message from Sharita Frias sent at 3/26/2020  9:14 AM CDT -----  Contact: patient  Type:  Patient Returning Call    Who Called:patient  Who Left Message for Patient:nurse  Does the patient know what this is regarding?:appt  Would the patient rather a call back or a response via [employer]? call  Best Call Back Number:[phone]  Additional Information: please call patient back

## 2020-03-26 NOTE — PATIENT INSTRUCTIONS
Sinusitis (Antibiotic Treatment)    The sinuses are air-filled spaces within the bones of the face. They connect to the inside of the nose. Sinusitis is an inflammation of the tissue lining the sinus cavity. Sinus inflammation can occur during a cold. It can also be due to allergies to pollens and other particles in the air. Sinusitis can cause symptoms of sinus congestion and fullness. A sinus infection causes fever, headache and facial pain. There is often green or yellow drainage from the nose or into the back of the throat (post-nasal drip). You have been given antibiotics to treat this condition.  Home care:  · Take the full course of antibiotics as instructed. Do not stop taking them, even if you feel better.  · Drink plenty of water, hot tea, and other liquids. This may help thin mucus. It also may promote sinus drainage.  · Heat may help soothe painful areas of the face. Use a towel soaked in hot water. Or,  the shower and direct the hot spray onto your face. Using a vaporizer along with a menthol rub at night may also help.   · An expectorant containing guaifenesin may help thin the mucus and promote drainage from the sinuses.  · Over-the-counter decongestants may be used unless a similar medicine was prescribed. Nasal sprays work the fastest. Use one that contains phenylephrine or oxymetazoline. First blow the nose gently. Then use the spray. Do not use these medicines more often than directed on the label or symptoms may get worse. You may also use tablets containing pseudoephedrine. Avoid products that combine ingredients, because side effects may be increased. Read labels. You can also ask the pharmacist for help. (NOTE: Persons with high blood pressure should not use decongestants. They can raise blood pressure.)  · Over-the-counter antihistamines may help if allergies contributed to your sinusitis.    · Do not use nasal rinses or irrigation during an acute sinus infection, unless told to by  your health care provider. Rinsing may spread the infection to other sinuses.  · Use acetaminophen or ibuprofen to control pain, unless another pain medicine was prescribed. (If you have chronic liver or kidney disease or ever had a stomach ulcer, talk with your doctor before using these medicines. Aspirin should never be used in anyone under 18 years of age who is ill with a fever. It may cause severe liver damage.)  · Don't smoke. This can worsen symptoms.  Follow-up care  Follow up with your healthcare provider or our staff if you are not improving within the next week.  When to seek medical advice  Call your healthcare provider if any of these occur:  · Facial pain or headache becoming more severe  · Stiff neck  · Unusual drowsiness or confusion  · Swelling of the forehead or eyelids  · Vision problems, including blurred or double vision  · Fever of 100.4ºF (38ºC) or higher, or as directed by your healthcare provider  · Seizure  · Breathing problems  · Symptoms not resolving within 10 days  Date Last Reviewed: 4/13/2015  © 4882-3614 [employer] [address] [place] [address], [place] [address]. All rights reserved. This information is not intended as a substitute for professional medical care. Always follow your healthcare professional's instructions.

## 2020-03-26 NOTE — ASSESSMENT & PLAN NOTE
Will treat pt for sinusitis since symptoms have been persistent for greater than 2 weeks. Pt requesting steroid injection, but discouraged this since the current recommendations for steroid use is not recommended due to increase in adverse effects with pt's with coronavirus. COVID 19 not suspected at this time, but discussed with pt the s/s to be aware of for COVID 19, as well as precautions to take such as social distancing, hand hygiene, and covering mouth/nose with sneezes and coughs. Recommended staying home as much as possible. Constitutional aspect of physical exam was performed by myself when AVS was brought to pt in and discussed with pt in parking lot of clinic.

## 2020-03-26 NOTE — TELEPHONE ENCOUNTER
----- Message from Aleja Bedoya sent at 3/26/2020  8:24 AM CDT -----  Contact: pt  Pt called to schedule appt ; sick for 3 weeks fever, fleam and chills pt stated he doesn't have access to set up a video visit ... Call back  : [phone] (home)

## 2020-05-02 ENCOUNTER — HOSPITAL ENCOUNTER (EMERGENCY)
Facility: HOSPITAL | Age: 65
Discharge: HOME | End: 2020-05-02
Attending: EMERGENCY MEDICINE
Payer: COMMERCIAL

## 2020-05-02 VITALS
OXYGEN SATURATION: 98 % | TEMPERATURE: 99 F | WEIGHT: 188.94 LBS | SYSTOLIC BLOOD PRESSURE: 122 MMHG | RESPIRATION RATE: 19 BRPM | DIASTOLIC BLOOD PRESSURE: 77 MMHG | HEIGHT: 69 IN | BODY MASS INDEX: 27.98 KG/M2 | HEART RATE: 78 BPM

## 2020-05-02 DIAGNOSIS — F10.930 ALCOHOL WITHDRAWAL SYNDROME WITHOUT COMPLICATION: Primary | ICD-10-CM

## 2020-05-02 DIAGNOSIS — R00.0 TACHYCARDIA: ICD-10-CM

## 2020-05-02 DIAGNOSIS — R11.10 EMESIS: ICD-10-CM

## 2020-05-02 LAB
ALBUMIN SERPL BCP-MCNC: 4.7 G/DL (ref 3.5–5.2)
ALP SERPL-CCNC: 84 U/L (ref 55–135)
ALT SERPL W/O P-5'-P-CCNC: 59 U/L (ref 10–44)
AMPHET+METHAMPHET UR QL: NEGATIVE
ANION GAP SERPL CALC-SCNC: 17 MMOL/L (ref 8–16)
AST SERPL-CCNC: 52 U/L (ref 10–40)
BACTERIA #/AREA URNS AUTO: NORMAL /HPF
BARBITURATES UR QL SCN>200 NG/ML: NEGATIVE
BASOPHILS # BLD AUTO: 0.02 K/UL (ref 0–0.2)
BASOPHILS NFR BLD: 0.3 % (ref 0–1.9)
BENZODIAZ UR QL SCN>200 NG/ML: NEGATIVE
BILIRUB SERPL-MCNC: 1.4 MG/DL (ref 0.1–1)
BILIRUB UR QL STRIP: ABNORMAL
BUN SERPL-MCNC: 7 MG/DL (ref 8–23)
BZE UR QL SCN: NEGATIVE
CALCIUM SERPL-MCNC: 9.3 MG/DL (ref 8.7–10.5)
CANNABINOIDS UR QL SCN: NORMAL
CHLORIDE SERPL-SCNC: 97 MMOL/L (ref 95–110)
CLARITY UR REFRACT.AUTO: ABNORMAL
CO2 SERPL-SCNC: 23 MMOL/L (ref 23–29)
COLOR UR AUTO: YELLOW
CREAT SERPL-MCNC: 1 MG/DL (ref 0.5–1.4)
CREAT UR-MCNC: 265 MG/DL (ref 23–375)
DIFFERENTIAL METHOD: ABNORMAL
EOSINOPHIL # BLD AUTO: 0 K/UL (ref 0–0.5)
EOSINOPHIL NFR BLD: 0.2 % (ref 0–8)
ERYTHROCYTE [DISTWIDTH] IN BLOOD BY AUTOMATED COUNT: 12.6 % (ref 11.5–14.5)
EST. GFR  (AFRICAN AMERICAN): >60 ML/MIN/1.73 M^2
EST. GFR  (NON AFRICAN AMERICAN): >60 ML/MIN/1.73 M^2
ETHANOL SERPL-MCNC: <10 MG/DL
GLUCOSE SERPL-MCNC: 137 MG/DL (ref 70–110)
GLUCOSE UR QL STRIP: NEGATIVE
HCT VFR BLD AUTO: 44.3 % (ref 40–54)
HCV AB SERPL QL IA: NEGATIVE
HGB BLD-MCNC: 16.1 G/DL (ref 14–18)
HGB UR QL STRIP: ABNORMAL
HIV 1+2 AB+HIV1 P24 AG SERPL QL IA: NEGATIVE
HYALINE CASTS UR QL AUTO: 1 /LPF
IMM GRANULOCYTES # BLD AUTO: 0.02 K/UL (ref 0–0.04)
IMM GRANULOCYTES NFR BLD AUTO: 0.3 % (ref 0–0.5)
KETONES UR QL STRIP: ABNORMAL
LEUKOCYTE ESTERASE UR QL STRIP: ABNORMAL
LIPASE SERPL-CCNC: 31 U/L (ref 4–60)
LYMPHOCYTES # BLD AUTO: 1.1 K/UL (ref 1–4.8)
LYMPHOCYTES NFR BLD: 18 % (ref 18–48)
MCH RBC QN AUTO: 34.5 PG (ref 27–31)
MCHC RBC AUTO-ENTMCNC: 36.3 G/DL (ref 32–36)
MCV RBC AUTO: 95 FL (ref 82–98)
METHADONE UR QL SCN>300 NG/ML: NEGATIVE
MICROSCOPIC COMMENT: NORMAL
MONOCYTES # BLD AUTO: 0.5 K/UL (ref 0.3–1)
MONOCYTES NFR BLD: 7.3 % (ref 4–15)
NEUTROPHILS # BLD AUTO: 4.7 K/UL (ref 1.8–7.7)
NEUTROPHILS NFR BLD: 73.9 % (ref 38–73)
NITRITE UR QL STRIP: NEGATIVE
NRBC BLD-RTO: 0 /100 WBC
OPIATES UR QL SCN: NEGATIVE
PCP UR QL SCN>25 NG/ML: NEGATIVE
PH UR STRIP: 7 [PH] (ref 5–8)
PLATELET # BLD AUTO: 176 K/UL (ref 150–350)
PMV BLD AUTO: 9.2 FL (ref 9.2–12.9)
POTASSIUM SERPL-SCNC: 3.8 MMOL/L (ref 3.5–5.1)
PROT SERPL-MCNC: 8.2 G/DL (ref 6–8.4)
PROT UR QL STRIP: ABNORMAL
RBC # BLD AUTO: 4.66 M/UL (ref 4.6–6.2)
RBC #/AREA URNS AUTO: 1 /HPF (ref 0–4)
SODIUM SERPL-SCNC: 137 MMOL/L (ref 136–145)
SP GR UR STRIP: 1.02 (ref 1–1.03)
SQUAMOUS #/AREA URNS AUTO: 1 /HPF
TOXICOLOGY INFORMATION: NORMAL
URN SPEC COLLECT METH UR: ABNORMAL
UROBILINOGEN UR STRIP-ACNC: NEGATIVE EU/DL
WBC # BLD AUTO: 6.29 K/UL (ref 3.9–12.7)
WBC #/AREA URNS AUTO: 4 /HPF (ref 0–5)

## 2020-05-02 PROCEDURE — 81000 URINALYSIS NONAUTO W/SCOPE: CPT | Mod: ER

## 2020-05-02 PROCEDURE — 86703 HIV-1/HIV-2 1 RESULT ANTBDY: CPT

## 2020-05-02 PROCEDURE — 99285 EMERGENCY DEPT VISIT HI MDM: CPT | Mod: 25,ER

## 2020-05-02 PROCEDURE — 96365 THER/PROPH/DIAG IV INF INIT: CPT | Mod: ER

## 2020-05-02 PROCEDURE — 93010 ELECTROCARDIOGRAM REPORT: CPT | Mod: ,,, | Performed by: INTERNAL MEDICINE

## 2020-05-02 PROCEDURE — 85025 COMPLETE CBC W/AUTO DIFF WBC: CPT | Mod: ER

## 2020-05-02 PROCEDURE — 96375 TX/PRO/DX INJ NEW DRUG ADDON: CPT | Mod: ER

## 2020-05-02 PROCEDURE — 25000003 PHARM REV CODE 250: Mod: ER | Performed by: EMERGENCY MEDICINE

## 2020-05-02 PROCEDURE — 80320 DRUG SCREEN QUANTALCOHOLS: CPT | Mod: ER

## 2020-05-02 PROCEDURE — 86803 HEPATITIS C AB TEST: CPT

## 2020-05-02 PROCEDURE — 93005 ELECTROCARDIOGRAM TRACING: CPT | Mod: ER

## 2020-05-02 PROCEDURE — 96368 THER/DIAG CONCURRENT INF: CPT | Mod: ER

## 2020-05-02 PROCEDURE — 63600175 PHARM REV CODE 636 W HCPCS: Mod: ER | Performed by: EMERGENCY MEDICINE

## 2020-05-02 PROCEDURE — 96366 THER/PROPH/DIAG IV INF ADDON: CPT | Mod: ER

## 2020-05-02 PROCEDURE — 83690 ASSAY OF LIPASE: CPT | Mod: ER

## 2020-05-02 PROCEDURE — 93010 EKG 12-LEAD: ICD-10-PCS | Mod: ,,, | Performed by: INTERNAL MEDICINE

## 2020-05-02 PROCEDURE — 80053 COMPREHEN METABOLIC PANEL: CPT | Mod: ER

## 2020-05-02 PROCEDURE — 80307 DRUG TEST PRSMV CHEM ANLYZR: CPT | Mod: ER

## 2020-05-02 RX ORDER — ONDANSETRON 2 MG/ML
4 INJECTION INTRAMUSCULAR; INTRAVENOUS
Status: COMPLETED | OUTPATIENT
Start: 2020-05-02 | End: 2020-05-02

## 2020-05-02 RX ORDER — ONDANSETRON 4 MG/1
4 TABLET, ORALLY DISINTEGRATING ORAL EVERY 6 HOURS PRN
Qty: 20 TABLET | Refills: 0 | Status: SHIPPED | OUTPATIENT
Start: 2020-05-02 | End: 2020-07-27 | Stop reason: SDUPTHER

## 2020-05-02 RX ADMIN — ONDANSETRON 4 MG: 2 INJECTION INTRAMUSCULAR; INTRAVENOUS at 10:05

## 2020-05-02 RX ADMIN — FOLIC ACID 1 MG: 5 INJECTION, SOLUTION INTRAMUSCULAR; INTRAVENOUS; SUBCUTANEOUS at 10:05

## 2020-05-02 RX ADMIN — THIAMINE HYDROCHLORIDE 100 MG: 100 INJECTION, SOLUTION INTRAMUSCULAR; INTRAVENOUS at 10:05

## 2020-05-02 RX ADMIN — ASCORBIC ACID, VITAMIN A PALMITATE, CHOLECALCIFEROL, THIAMINE HYDROCHLORIDE, RIBOFLAVIN-5 PHOSPHATE SODIUM, PYRIDOXINE HYDROCHLORIDE, NIACINAMIDE, DEXPANTHENOL, ALPHA-TOCOPHEROL ACETATE, VITAMIN K1, FOLIC ACID, BIOTIN, CYANOCOBALAMIN: 200; 3300; 200; 6; 3.6; 6; 40; 15; 10; 150; 600; 60; 5 INJECTION, SOLUTION INTRAVENOUS at 10:05

## 2020-05-02 RX ADMIN — SODIUM CHLORIDE 1000 ML: 0.9 INJECTION, SOLUTION INTRAVENOUS at 10:05

## 2020-05-02 RX ADMIN — LORAZEPAM 0.5 MG: 2 INJECTION INTRAMUSCULAR; INTRAVENOUS at 10:05

## 2020-05-02 NOTE — ED PROVIDER NOTES
Encounter Date: 5/2/2020       History     Chief Complaint   Patient presents with    Vomiting     D Ts     The history is provided by the patient.   Emesis    This is a new problem. The current episode started yesterday. The problem occurs 5 - 10 times per day. The problem has been unchanged. The emesis has an appearance of stomach contents and bilious material. Associated symptoms include abdominal pain (mild generalized cramping. pt states he normally drinks half a pint of whiskey. recently had a death in the family and pt started drinking heavier this past weekend, about a fifth of whiskey.  last drink was 4 days ago.  pt has hx of etoh withdrawls. no sx). Pertinent negatives include no arthralgias, no chills, no cough, no diarrhea, no fever, no headaches, no myalgias, no sweats and no URI. Risk factors: hx of etoh withdrawals without seizure activity.     Review of patient's allergies indicates:  No Known Allergies  Past Medical History:   Diagnosis Date    Alcohol abuse     Alcohol dependence     Anxiety     Depression     History of suicidal ideation      History reviewed. No pertinent surgical history.  History reviewed. No pertinent family history.  Social History     Tobacco Use    Smoking status: Current Every Day Smoker     Packs/day: 1.00     Types: Cigarettes    Smokeless tobacco: Never Used   Substance Use Topics    Alcohol use: Yes    Drug use: No     Review of Systems   Constitutional: Negative for chills and fever.   HENT: Negative for sore throat.    Respiratory: Negative for cough and shortness of breath.    Cardiovascular: Negative for chest pain.   Gastrointestinal: Positive for abdominal pain (mild generalized cramping. pt states he normally drinks half a pint of whiskey. recently had a death in the family and pt started drinking heavier this past weekend, about a fifth of whiskey.  last drink was 4 days ago.  pt has hx of etoh withdrawls. no sx) and vomiting. Negative for diarrhea  and nausea.   Genitourinary: Negative for dysuria.   Musculoskeletal: Negative for arthralgias, back pain and myalgias.   Skin: Negative for rash.   Neurological: Negative for weakness and headaches.   Hematological: Does not bruise/bleed easily.   All other systems reviewed and are negative.      Physical Exam     Initial Vitals [05/02/20 0919]   BP Pulse Resp Temp SpO2   138/82 89 20 98.6 °F (37 °C) 96 %      MAP       --         Physical Exam    Nursing note and vitals reviewed.  Constitutional: He appears well-developed and well-nourished. He is not diaphoretic. No distress.   HENT:   Head: Normocephalic and atraumatic.   Right Ear: Hearing normal.   Left Ear: Hearing normal.   Nose: Nose normal.   Mouth/Throat: Uvula is midline, oropharynx is clear and moist and mucous membranes are normal.   Eyes: EOM are normal. Pupils are equal, round, and reactive to light. No scleral icterus.   Neck: Normal range of motion. Neck supple. No thyromegaly present.   Cardiovascular: Normal rate, regular rhythm, normal heart sounds and intact distal pulses. Exam reveals no gallop and no friction rub.    No murmur heard.  Pulmonary/Chest: Effort normal and breath sounds normal. No respiratory distress. He has no decreased breath sounds. He has no wheezes. He has no rhonchi. He exhibits no tenderness.   Abdominal: Soft. Bowel sounds are normal. He exhibits no distension. There is no tenderness. There is no rigidity, no rebound, no guarding, no CVA tenderness, no tenderness at McBurney's point and negative Cuello's sign. No hernia.   No ttp to light or deep palpation. No palpable masses. No hernias.   Musculoskeletal: Normal range of motion. He exhibits no edema or tenderness.        Right ankle: Normal. He exhibits normal pulse. No tenderness. Achilles tendon normal.        Left ankle: Normal. He exhibits normal pulse. No tenderness. Achilles tendon normal.        Cervical back: Normal.        Thoracic back: Normal.        Lumbar  back: Normal.        Right hand: Normal. He exhibits normal capillary refill. Normal sensation noted. Normal strength noted.        Left hand: Normal. He exhibits normal capillary refill. Normal sensation noted. Normal strength noted.   No significant asterixis at this point in time but pt does seem anxious.   Lymphadenopathy:     He has no cervical adenopathy.   Neurological: He is alert and oriented to person, place, and time. He has normal strength. No cranial nerve deficit or sensory deficit. GCS score is 15. GCS eye subscore is 4. GCS verbal subscore is 5. GCS motor subscore is 6.   No focal neuro deficits.   Skin: Skin is warm and dry. Capillary refill takes less than 2 seconds.   Psychiatric: His speech is normal and behavior is normal. Judgment and thought content normal. His mood appears anxious. He is not actively hallucinating. Thought content is not delusional. Cognition and memory are normal. He expresses no homicidal and no suicidal ideation. He expresses no suicidal plans and no homicidal plans. He is attentive.         ED Course   Procedures  Labs Reviewed   CBC W/ AUTO DIFFERENTIAL - Abnormal; Notable for the following components:       Result Value    Mean Corpuscular Hemoglobin 34.5 (*)     Mean Corpuscular Hemoglobin Conc 36.3 (*)     Gran% 73.9 (*)     All other components within normal limits   COMPREHENSIVE METABOLIC PANEL - Abnormal; Notable for the following components:    Glucose 137 (*)     BUN, Bld 7 (*)     Total Bilirubin 1.4 (*)     AST 52 (*)     ALT 59 (*)     Anion Gap 17 (*)     All other components within normal limits   URINALYSIS, REFLEX TO URINE CULTURE - Abnormal; Notable for the following components:    Appearance, UA Hazy (*)     Protein, UA 2+ (*)     Ketones, UA Trace (*)     Bilirubin (UA) 1+ (*)     Occult Blood UA Trace (*)     Leukocytes, UA 1+ (*)     All other components within normal limits    Narrative:     Preferred Collection Type->Urine, Clean Catch   LIPASE  "  DRUG SCREEN PANEL, URINE EMERGENCY    Narrative:     Preferred Collection Type->Urine, Clean Catch   ALCOHOL,MEDICAL (ETHANOL)   URINALYSIS MICROSCOPIC    Narrative:     Preferred Collection Type->Urine, Clean Catch   HIV 1 / 2 ANTIBODY   HEPATITIS C ANTIBODY     EKG Readings: (Independently Interpreted)   Initial Reading: No STEMI. Previous EKG: Compared with most recent EKG Previous EKG Date: 2/9/20. Rhythm: Normal Sinus Rhythm. Heart Rate: 90. Ectopy: No Ectopy. Conduction: RBBB. ST Segments: Normal ST Segments. T Waves: Normal. Clinical Impression: Normal Sinus Rhythm with RBBB Other Impression: incomplete RBBB       Imaging Results          X-Ray Abdomen Flat And Erect (Final result)  Result time 05/02/20 11:03:09    Final result by Randall Henry MD (05/02/20 11:03:09)                 Impression:      No evidence for bowel obstruction.      Electronically signed by: Randall Henry MD  Date:    05/02/2020  Time:    11:03             Narrative:    EXAMINATION:  XR ABDOMEN FLAT AND ERECT    CLINICAL HISTORY:  Vomiting, unspecified    TECHNIQUE:  Flat and erect AP views of the abdomen were performed.    COMPARISON:  Abdominal x-ray, 09/25/2017    FINDINGS:  No free air in the peritoneum.  No dilated bowel loops.  There is a dextroscoliosis of the lumbar spine.                                       Vitals:    05/02/20 0919 05/02/20 0943 05/02/20 1000 05/02/20 1001   BP: 138/82 (!) 146/87  (!) 135/90   Pulse: 89 88 84 88   Resp: 20   18   Temp: 98.6 °F (37 °C)      TempSrc: Oral      SpO2: 96% (!) 94%  98%   Weight: 85.7 kg (188 lb 15 oz)      Height: 5' 9" (1.753 m)       05/02/20 1031 05/02/20 1101 05/02/20 1131 05/02/20 1201   BP: (!) 142/80 121/77 120/79 122/77   Pulse: 82 76 79 78   Resp: (!) 21 20 16 19   Temp:       TempSrc:       SpO2: 96% 96% 95% 98%   Weight:       Height:           Results for orders placed or performed during the hospital encounter of 05/02/20   CBC W/ AUTO DIFFERENTIAL   Result Value " Ref Range    WBC 6.29 3.90 - 12.70 K/uL    RBC 4.66 4.60 - 6.20 M/uL    Hemoglobin 16.1 14.0 - 18.0 g/dL    Hematocrit 44.3 40.0 - 54.0 %    Mean Corpuscular Volume 95 82 - 98 fL    Mean Corpuscular Hemoglobin 34.5 (H) 27.0 - 31.0 pg    Mean Corpuscular Hemoglobin Conc 36.3 (H) 32.0 - 36.0 g/dL    RDW 12.6 11.5 - 14.5 %    Platelets 176 150 - 350 K/uL    MPV 9.2 9.2 - 12.9 fL    Immature Granulocytes 0.3 0.0 - 0.5 %    Gran # (ANC) 4.7 1.8 - 7.7 K/uL    Immature Grans (Abs) 0.02 0.00 - 0.04 K/uL    Lymph # 1.1 1.0 - 4.8 K/uL    Mono # 0.5 0.3 - 1.0 K/uL    Eos # 0.0 0.0 - 0.5 K/uL    Baso # 0.02 0.00 - 0.20 K/uL    nRBC 0 0 /100 WBC    Gran% 73.9 (H) 38.0 - 73.0 %    Lymph% 18.0 18.0 - 48.0 %    Mono% 7.3 4.0 - 15.0 %    Eosinophil% 0.2 0.0 - 8.0 %    Basophil% 0.3 0.0 - 1.9 %    Differential Method Automated    Comp. Metabolic Panel   Result Value Ref Range    Sodium 137 136 - 145 mmol/L    Potassium 3.8 3.5 - 5.1 mmol/L    Chloride 97 95 - 110 mmol/L    CO2 23 23 - 29 mmol/L    Glucose 137 (H) 70 - 110 mg/dL    BUN, Bld 7 (L) 8 - 23 mg/dL    Creatinine 1.0 0.5 - 1.4 mg/dL    Calcium 9.3 8.7 - 10.5 mg/dL    Total Protein 8.2 6.0 - 8.4 g/dL    Albumin 4.7 3.5 - 5.2 g/dL    Total Bilirubin 1.4 (H) 0.1 - 1.0 mg/dL    Alkaline Phosphatase 84 55 - 135 U/L    AST 52 (H) 10 - 40 U/L    ALT 59 (H) 10 - 44 U/L    Anion Gap 17 (H) 8 - 16 mmol/L    eGFR if African American >60.0 >60 mL/min/1.73 m^2    eGFR if non African American >60.0 >60 mL/min/1.73 m^2   Lipase   Result Value Ref Range    Lipase 31 4 - 60 U/L   Urinalysis, Reflex to Urine Culture Urine, Clean Catch   Result Value Ref Range    Specimen UA Urine, Clean Catch     Color, UA Yellow Yellow, Straw, Daily    Appearance, UA Hazy (A) Clear    pH, UA 7.0 5.0 - 8.0    Specific Gravity, UA 1.020 1.005 - 1.030    Protein, UA 2+ (A) Negative    Glucose, UA Negative Negative    Ketones, UA Trace (A) Negative    Bilirubin (UA) 1+ (A) Negative    Occult Blood UA Trace (A)  Negative    Nitrite, UA Negative Negative    Urobilinogen, UA Negative <2.0 EU/dL    Leukocytes, UA 1+ (A) Negative   Drug screen panel, emergency   Result Value Ref Range    Benzodiazepines Negative     Methadone metabolites Negative     Cocaine (Metab.) Negative     Opiate Scrn, Ur Negative     Barbiturate Screen, Ur Negative     Amphetamine Screen, Ur Negative     THC Presumptive Positive     Phencyclidine Negative     Creatinine, Random Ur 265.0 23.0 - 375.0 mg/dL    Toxicology Information SEE COMMENT    Ethanol   Result Value Ref Range    Alcohol, Medical, Serum <10 <10 mg/dL   Urinalysis Microscopic   Result Value Ref Range    RBC, UA 1 0 - 4 /hpf    WBC, UA 4 0 - 5 /hpf    Bacteria Rare None-Occ /hpf    Squam Epithel, UA 1 /hpf    Hyaline Casts, UA 1 0-1/lpf /lpf    Microscopic Comment SEE COMMENT          Imaging Results          X-Ray Abdomen Flat And Erect (Final result)  Result time 05/02/20 11:03:09    Final result by Randall Henry MD (05/02/20 11:03:09)                 Impression:      No evidence for bowel obstruction.      Electronically signed by: Randall Henry MD  Date:    05/02/2020  Time:    11:03             Narrative:    EXAMINATION:  XR ABDOMEN FLAT AND ERECT    CLINICAL HISTORY:  Vomiting, unspecified    TECHNIQUE:  Flat and erect AP views of the abdomen were performed.    COMPARISON:  Abdominal x-ray, 09/25/2017    FINDINGS:  No free air in the peritoneum.  No dilated bowel loops.  There is a dextroscoliosis of the lumbar spine.                                Medications   sodium chloride 0.9% bolus 1,000 mL (0 mLs Intravenous Stopped 5/2/20 1139)   ondansetron injection 4 mg (4 mg Intravenous Given 5/2/20 1009)   lorazepam (ATIVAN) injection 0.5 mg (0.5 mg Intravenous Given 5/2/20 1008)   sodium chloride 0.9% 1,000 mL with mvi, adult no.4 with vit K 3,300 unit- 150 mcg/10 mL 10 mL infusion ( Intravenous Stopped 5/2/20 1203)     And   folic acid 1 mg in sodium chloride 0.9% 100 mL IVPB (0 mg  Intravenous Stopped 5/2/20 1020)     And   thiamine (B-1) 100 mg in dextrose 5 % 50 mL IVPB (0 mg Intravenous Stopped 5/2/20 1203)         11:02 AM  - Re-evaluation:  The patient is resting comfortably and is in no acute distress. Discussed test results and notified of pending labs. Answered questions at this time.     11:33 AM - Re-evaluation: The patient is resting comfortably and is in no acute distress. He states that his symptoms have improved after treatment within ER. Discussed test results, shared treatment plan, specific conditions for return, and importance of follow up with patient and family.  He understands and agrees with the plan as discussed. Answered  his questions at this time. He has remained hemodynamically stable throughout the ED course and is appropriate for discharge home.     Regarding VOMITING, I advised patient on importance of staying well hydrated; eating frequent, small amounts of clear liquids; avoid solid foods until there has been no vomiting for six hours, and then work slowly back to a normal diet; and to use an OTC bismuth stomach remedy for upset stomach, nausea, indigestion, and diarrhea. I discussed signs and symptoms of dehydration and possible causes of nausea and vomiting including viral infections, medications, migraine headaches, food poisoning, allergies, and peptic ulcer disease. Instructed patient to take medications as prescribed and to follow up with primary care provider or return to ER if condition worsens.     Pre-hypertension/Hypertension: The pt has been informed that they may have pre-hypertension or hypertension based on a blood pressure reading in the ED. I recommend that the pt call the PCP listed on their discharge instructions or a physician of their choice this week to arrange f/u for further evaluation of possible pre-hypertension or hypertension.     Barb Carson was given a handout which discussed their disease process, precautions, and instructions  for follow-up and therapy.    Follow-up Information     Tony Paul DO. Schedule an appointment as soon as possible for a visit in 1 week.    Specialty:  Family Medicine  Contact information:  [id number]  [place] [address] [phone]             [facility].    Specialty:  Emergency Medicine  Why:  As needed, If symptoms worsen  Contact information:  [address]  [facility] [address] [phone]                    Medication List      START taking these medications    ondansetron 4 MG Tbdl  Commonly known as:  ZOFRAN-ODT  Take 1 tablet (4 mg total) by mouth every 6 (six) hours as needed (NAUSEA/VOMITING).        ASK your doctor about these medications    buPROPion 300 MG 24 hr tablet  Commonly known as:  WELLBUTRIN XL  Take 1 tablet (300 mg total) by mouth once daily.     cetirizine 10 MG tablet  Commonly known as:  ZYRTEC  Take 1 tablet (10 mg total) by mouth once daily.     fluticasone propionate 50 mcg/actuation nasal spray  Commonly known as:  FLONASE  2 sprays (100 mcg total) by Each Nostril route once daily.     folic acid 1 MG tablet  Commonly known as:  FOLVITE  TAKE ONE TABLET BY MOUTH AS NEEDED AS DIRECTED     ibuprofen 200 MG tablet  Commonly known as:  ADVIL,MOTRIN     LORazepam 1 MG tablet  Commonly known as:  ATIVAN  Take 1 tablet (1 mg total) by mouth 2 (two) times daily as needed (Anxiety/Tremors).     multivitamin capsule  Take 1 capsule by mouth once daily.     naltrexone 50 mg tablet  Commonly known as:  DEPADE           Where to Get Your Medications      You can get these medications from any pharmacy    Bring a paper prescription for each of these medications  · ondansetron 4 MG Tbdl        Current Discharge Medication List            ED Diagnosis  1. Alcohol withdrawal syndrome without complication    2. Tachycardia    3. Emesis                                     Clinical Impression:       ICD-10-CM ICD-9-CM   1. Alcohol withdrawal syndrome without  complication F10.230 291.81   2. Tachycardia R00.0 785.0   3. Emesis R11.10 787.03         Disposition:   Disposition: Discharged  Condition: Stable     ED Disposition Condition    Discharge Stable        ED Prescriptions     Medication Sig Dispense Start Date End Date Auth. Provider    ondansetron (ZOFRAN-ODT) 4 MG TbDL Take 1 tablet (4 mg total) by mouth every 6 (six) hours as needed (NAUSEA/VOMITING). 20 tablet 5/2/2020  Bull Flro Jr., MD        Follow-up Information     Follow up With Specialties Details Why Contact Info    Tony Paul,  Family Medicine Schedule an appointment as soon as possible for a visit in 1 week  [address] [place] [id number]  [phone]      [facility]  As needed, If symptoms worsen [address] [place] [id number] [phone]                                     Bull Flor Jr., MD  05/02/20 6946

## 2020-06-27 ENCOUNTER — HOSPITAL ENCOUNTER (EMERGENCY)
Facility: HOSPITAL | Age: 65
Discharge: HOME | End: 2020-06-27
Attending: EMERGENCY MEDICINE
Payer: COMMERCIAL

## 2020-06-27 VITALS
SYSTOLIC BLOOD PRESSURE: 153 MMHG | RESPIRATION RATE: 18 BRPM | WEIGHT: 202.25 LBS | HEART RATE: 85 BPM | TEMPERATURE: 98 F | DIASTOLIC BLOOD PRESSURE: 94 MMHG | OXYGEN SATURATION: 96 % | BODY MASS INDEX: 29.96 KG/M2 | HEIGHT: 69 IN

## 2020-06-27 DIAGNOSIS — R11.2 NON-INTRACTABLE VOMITING WITH NAUSEA, UNSPECIFIED VOMITING TYPE: Primary | ICD-10-CM

## 2020-06-27 DIAGNOSIS — F10.10 ALCOHOL ABUSE: ICD-10-CM

## 2020-06-27 LAB
ALBUMIN SERPL BCP-MCNC: 4.9 G/DL (ref 3.5–5.2)
ALP SERPL-CCNC: 85 U/L (ref 55–135)
ALT SERPL W/O P-5'-P-CCNC: 100 U/L (ref 10–44)
ANION GAP SERPL CALC-SCNC: 25 MMOL/L (ref 8–16)
AST SERPL-CCNC: 104 U/L (ref 10–40)
BASOPHILS # BLD AUTO: 0.04 K/UL (ref 0–0.2)
BASOPHILS NFR BLD: 0.6 % (ref 0–1.9)
BILIRUB SERPL-MCNC: 1.3 MG/DL (ref 0.1–1)
BUN SERPL-MCNC: 11 MG/DL (ref 8–23)
CALCIUM SERPL-MCNC: 9.7 MG/DL (ref 8.7–10.5)
CHLORIDE SERPL-SCNC: 97 MMOL/L (ref 95–110)
CO2 SERPL-SCNC: 20 MMOL/L (ref 23–29)
CREAT SERPL-MCNC: 1.6 MG/DL (ref 0.5–1.4)
DIFFERENTIAL METHOD: ABNORMAL
EOSINOPHIL # BLD AUTO: 0.1 K/UL (ref 0–0.5)
EOSINOPHIL NFR BLD: 1.9 % (ref 0–8)
ERYTHROCYTE [DISTWIDTH] IN BLOOD BY AUTOMATED COUNT: 14.6 % (ref 11.5–14.5)
EST. GFR  (AFRICAN AMERICAN): 52.2 ML/MIN/1.73 M^2
EST. GFR  (NON AFRICAN AMERICAN): 45.2 ML/MIN/1.73 M^2
ETHANOL SERPL-MCNC: 84 MG/DL
GLUCOSE SERPL-MCNC: 106 MG/DL (ref 70–110)
HCT VFR BLD AUTO: 47.2 % (ref 40–54)
HGB BLD-MCNC: 16.6 G/DL (ref 14–18)
IMM GRANULOCYTES # BLD AUTO: 0.02 K/UL (ref 0–0.04)
IMM GRANULOCYTES NFR BLD AUTO: 0.3 % (ref 0–0.5)
LIPASE SERPL-CCNC: 46 U/L (ref 4–60)
LYMPHOCYTES # BLD AUTO: 1.2 K/UL (ref 1–4.8)
LYMPHOCYTES NFR BLD: 17.8 % (ref 18–48)
MCH RBC QN AUTO: 34.6 PG (ref 27–31)
MCHC RBC AUTO-ENTMCNC: 35.2 G/DL (ref 32–36)
MCV RBC AUTO: 98 FL (ref 82–98)
MONOCYTES # BLD AUTO: 0.5 K/UL (ref 0.3–1)
MONOCYTES NFR BLD: 7.3 % (ref 4–15)
NEUTROPHILS # BLD AUTO: 5 K/UL (ref 1.8–7.7)
NEUTROPHILS NFR BLD: 72.1 % (ref 38–73)
NRBC BLD-RTO: 0 /100 WBC
PLATELET # BLD AUTO: 158 K/UL (ref 150–350)
PMV BLD AUTO: 8.9 FL (ref 9.2–12.9)
POTASSIUM SERPL-SCNC: 3.9 MMOL/L (ref 3.5–5.1)
PROT SERPL-MCNC: 8.2 G/DL (ref 6–8.4)
RBC # BLD AUTO: 4.8 M/UL (ref 4.6–6.2)
SARS-COV-2 RDRP RESP QL NAA+PROBE: NEGATIVE
SODIUM SERPL-SCNC: 142 MMOL/L (ref 136–145)
WBC # BLD AUTO: 6.86 K/UL (ref 3.9–12.7)

## 2020-06-27 PROCEDURE — 96366 THER/PROPH/DIAG IV INF ADDON: CPT | Mod: ER

## 2020-06-27 PROCEDURE — 96367 TX/PROPH/DG ADDL SEQ IV INF: CPT | Mod: ER

## 2020-06-27 PROCEDURE — 25000003 PHARM REV CODE 250: Mod: ER | Performed by: EMERGENCY MEDICINE

## 2020-06-27 PROCEDURE — 83690 ASSAY OF LIPASE: CPT | Mod: ER

## 2020-06-27 PROCEDURE — 96368 THER/DIAG CONCURRENT INF: CPT | Mod: ER

## 2020-06-27 PROCEDURE — 85025 COMPLETE CBC W/AUTO DIFF WBC: CPT | Mod: ER

## 2020-06-27 PROCEDURE — 80320 DRUG SCREEN QUANTALCOHOLS: CPT | Mod: ER

## 2020-06-27 PROCEDURE — 63600175 PHARM REV CODE 636 W HCPCS: Mod: ER | Performed by: EMERGENCY MEDICINE

## 2020-06-27 PROCEDURE — 96375 TX/PRO/DX INJ NEW DRUG ADDON: CPT | Mod: ER

## 2020-06-27 PROCEDURE — 80053 COMPREHEN METABOLIC PANEL: CPT | Mod: ER

## 2020-06-27 PROCEDURE — 99284 EMERGENCY DEPT VISIT MOD MDM: CPT | Mod: 25,ER

## 2020-06-27 PROCEDURE — 96365 THER/PROPH/DIAG IV INF INIT: CPT | Mod: ER

## 2020-06-27 PROCEDURE — U0002 COVID-19 LAB TEST NON-CDC: HCPCS | Mod: ER

## 2020-06-27 PROCEDURE — 96374 THER/PROPH/DIAG INJ IV PUSH: CPT | Mod: 59,ER

## 2020-06-27 RX ORDER — PROMETHAZINE HYDROCHLORIDE 25 MG/1
25 TABLET ORAL EVERY 6 HOURS PRN
Qty: 15 TABLET | Refills: 0 | Status: SHIPPED | OUTPATIENT
Start: 2020-06-27

## 2020-06-27 RX ORDER — CHLORDIAZEPOXIDE HYDROCHLORIDE 10 MG/1
CAPSULE, GELATIN COATED ORAL
Qty: 18 CAPSULE | Refills: 0 | Status: ON HOLD | OUTPATIENT
Start: 2020-06-27 | End: 2020-09-13 | Stop reason: HOSPADM

## 2020-06-27 RX ORDER — ONDANSETRON 2 MG/ML
4 INJECTION INTRAMUSCULAR; INTRAVENOUS
Status: COMPLETED | OUTPATIENT
Start: 2020-06-27 | End: 2020-06-27

## 2020-06-27 RX ADMIN — PROMETHAZINE HYDROCHLORIDE 12.5 MG: 25 INJECTION INTRAMUSCULAR; INTRAVENOUS at 04:06

## 2020-06-27 RX ADMIN — SODIUM CHLORIDE 1000 ML: 0.9 INJECTION, SOLUTION INTRAVENOUS at 04:06

## 2020-06-27 RX ADMIN — THIAMINE HYDROCHLORIDE 100 MG: 100 INJECTION, SOLUTION INTRAMUSCULAR; INTRAVENOUS at 04:06

## 2020-06-27 RX ADMIN — ASCORBIC ACID, VITAMIN A PALMITATE, CHOLECALCIFEROL, THIAMINE HYDROCHLORIDE, RIBOFLAVIN-5 PHOSPHATE SODIUM, PYRIDOXINE HYDROCHLORIDE, NIACINAMIDE, DEXPANTHENOL, ALPHA-TOCOPHEROL ACETATE, VITAMIN K1, FOLIC ACID, BIOTIN, CYANOCOBALAMIN: 200; 3300; 200; 6; 3.6; 6; 40; 15; 10; 150; 600; 60; 5 INJECTION, SOLUTION INTRAVENOUS at 04:06

## 2020-06-27 RX ADMIN — FOLIC ACID 1 MG: 5 INJECTION, SOLUTION INTRAMUSCULAR; INTRAVENOUS; SUBCUTANEOUS at 05:06

## 2020-06-27 RX ADMIN — LORAZEPAM 1 MG: 2 INJECTION INTRAMUSCULAR; INTRAVENOUS at 04:06

## 2020-06-27 RX ADMIN — ONDANSETRON 4 MG: 2 INJECTION INTRAMUSCULAR; INTRAVENOUS at 08:06

## 2020-06-29 PROBLEM — J01.90 SUBACUTE SINUSITIS: Status: RESOLVED | Noted: 2020-03-26 | Resolved: 2020-06-29

## 2020-07-22 ENCOUNTER — HOSPITAL ENCOUNTER (EMERGENCY)
Facility: HOSPITAL | Age: 65
Discharge: HOME | End: 2020-07-22
Attending: EMERGENCY MEDICINE
Payer: COMMERCIAL

## 2020-07-22 VITALS
SYSTOLIC BLOOD PRESSURE: 128 MMHG | RESPIRATION RATE: 22 BRPM | DIASTOLIC BLOOD PRESSURE: 60 MMHG | HEIGHT: 69 IN | WEIGHT: 200 LBS | OXYGEN SATURATION: 96 % | HEART RATE: 97 BPM | TEMPERATURE: 99 F | BODY MASS INDEX: 29.62 KG/M2

## 2020-07-22 DIAGNOSIS — F10.930 ALCOHOL WITHDRAWAL SYNDROME WITHOUT COMPLICATION: ICD-10-CM

## 2020-07-22 DIAGNOSIS — F10.20 ALCOHOL USE DISORDER, MODERATE, DEPENDENCE: ICD-10-CM

## 2020-07-22 DIAGNOSIS — R11.2 NON-INTRACTABLE VOMITING WITH NAUSEA, UNSPECIFIED VOMITING TYPE: Primary | ICD-10-CM

## 2020-07-22 LAB
ALBUMIN SERPL BCP-MCNC: 4.7 G/DL (ref 3.5–5.2)
ALP SERPL-CCNC: 115 U/L (ref 55–135)
ALT SERPL W/O P-5'-P-CCNC: 58 U/L (ref 10–44)
ANION GAP SERPL CALC-SCNC: 25 MMOL/L (ref 8–16)
AST SERPL-CCNC: 53 U/L (ref 10–40)
BASOPHILS # BLD AUTO: 0.03 K/UL (ref 0–0.2)
BASOPHILS NFR BLD: 0.3 % (ref 0–1.9)
BILIRUB SERPL-MCNC: 1.2 MG/DL (ref 0.1–1)
BUN SERPL-MCNC: 12 MG/DL (ref 8–23)
CALCIUM SERPL-MCNC: 9.5 MG/DL (ref 8.7–10.5)
CHLORIDE SERPL-SCNC: 95 MMOL/L (ref 95–110)
CO2 SERPL-SCNC: 21 MMOL/L (ref 23–29)
CREAT SERPL-MCNC: 1.4 MG/DL (ref 0.5–1.4)
DIFFERENTIAL METHOD: ABNORMAL
EOSINOPHIL # BLD AUTO: 0 K/UL (ref 0–0.5)
EOSINOPHIL NFR BLD: 0.1 % (ref 0–8)
ERYTHROCYTE [DISTWIDTH] IN BLOOD BY AUTOMATED COUNT: 13.9 % (ref 11.5–14.5)
EST. GFR  (AFRICAN AMERICAN): >60 ML/MIN/1.73 M^2
EST. GFR  (NON AFRICAN AMERICAN): 53.1 ML/MIN/1.73 M^2
GLUCOSE SERPL-MCNC: 158 MG/DL (ref 70–110)
HCT VFR BLD AUTO: 45.9 % (ref 40–54)
HGB BLD-MCNC: 16.4 G/DL (ref 14–18)
IMM GRANULOCYTES # BLD AUTO: 0.03 K/UL (ref 0–0.04)
IMM GRANULOCYTES NFR BLD AUTO: 0.3 % (ref 0–0.5)
INR PPP: 1 (ref 0.8–1.2)
LIPASE SERPL-CCNC: 30 U/L (ref 4–60)
LYMPHOCYTES # BLD AUTO: 1 K/UL (ref 1–4.8)
LYMPHOCYTES NFR BLD: 9.5 % (ref 18–48)
MCH RBC QN AUTO: 35 PG (ref 27–31)
MCHC RBC AUTO-ENTMCNC: 35.7 G/DL (ref 32–36)
MCV RBC AUTO: 98 FL (ref 82–98)
MONOCYTES # BLD AUTO: 0.5 K/UL (ref 0.3–1)
MONOCYTES NFR BLD: 4.7 % (ref 4–15)
NEUTROPHILS # BLD AUTO: 9.3 K/UL (ref 1.8–7.7)
NEUTROPHILS NFR BLD: 85.1 % (ref 38–73)
NRBC BLD-RTO: 0 /100 WBC
PLATELET # BLD AUTO: 188 K/UL (ref 150–350)
PMV BLD AUTO: 8.8 FL (ref 9.2–12.9)
POTASSIUM SERPL-SCNC: 3.9 MMOL/L (ref 3.5–5.1)
PROT SERPL-MCNC: 8.7 G/DL (ref 6–8.4)
PROTHROMBIN TIME: 10 SEC (ref 9–12.5)
RBC # BLD AUTO: 4.69 M/UL (ref 4.6–6.2)
SARS-COV-2 RDRP RESP QL NAA+PROBE: NEGATIVE
SODIUM SERPL-SCNC: 141 MMOL/L (ref 136–145)
TROPONIN I SERPL DL<=0.01 NG/ML-MCNC: 0.03 NG/ML (ref 0–0.03)
WBC # BLD AUTO: 10.96 K/UL (ref 3.9–12.7)

## 2020-07-22 PROCEDURE — 93010 ELECTROCARDIOGRAM REPORT: CPT | Mod: ,,, | Performed by: INTERNAL MEDICINE

## 2020-07-22 PROCEDURE — 25000003 PHARM REV CODE 250: Mod: ER | Performed by: EMERGENCY MEDICINE

## 2020-07-22 PROCEDURE — U0002 COVID-19 LAB TEST NON-CDC: HCPCS | Mod: ER

## 2020-07-22 PROCEDURE — 83690 ASSAY OF LIPASE: CPT | Mod: ER

## 2020-07-22 PROCEDURE — 84484 ASSAY OF TROPONIN QUANT: CPT | Mod: ER

## 2020-07-22 PROCEDURE — 96374 THER/PROPH/DIAG INJ IV PUSH: CPT | Mod: ER

## 2020-07-22 PROCEDURE — 85610 PROTHROMBIN TIME: CPT | Mod: ER

## 2020-07-22 PROCEDURE — 63600175 PHARM REV CODE 636 W HCPCS: Mod: ER | Performed by: EMERGENCY MEDICINE

## 2020-07-22 PROCEDURE — 93010 EKG 12-LEAD: ICD-10-PCS | Mod: ,,, | Performed by: INTERNAL MEDICINE

## 2020-07-22 PROCEDURE — 80053 COMPREHEN METABOLIC PANEL: CPT | Mod: ER

## 2020-07-22 PROCEDURE — 96375 TX/PRO/DX INJ NEW DRUG ADDON: CPT | Mod: ER

## 2020-07-22 PROCEDURE — C9113 INJ PANTOPRAZOLE SODIUM, VIA: HCPCS | Mod: ER | Performed by: EMERGENCY MEDICINE

## 2020-07-22 PROCEDURE — 96376 TX/PRO/DX INJ SAME DRUG ADON: CPT | Mod: ER

## 2020-07-22 PROCEDURE — 99285 EMERGENCY DEPT VISIT HI MDM: CPT | Mod: 25,ER

## 2020-07-22 PROCEDURE — 96372 THER/PROPH/DIAG INJ SC/IM: CPT | Mod: ER,59

## 2020-07-22 PROCEDURE — 93005 ELECTROCARDIOGRAM TRACING: CPT | Mod: ER

## 2020-07-22 PROCEDURE — 85025 COMPLETE CBC W/AUTO DIFF WBC: CPT | Mod: ER

## 2020-07-22 PROCEDURE — 96361 HYDRATE IV INFUSION ADD-ON: CPT | Mod: ER

## 2020-07-22 RX ORDER — PANTOPRAZOLE SODIUM 40 MG/10ML
40 INJECTION, POWDER, LYOPHILIZED, FOR SOLUTION INTRAVENOUS
Status: COMPLETED | OUTPATIENT
Start: 2020-07-22 | End: 2020-07-22

## 2020-07-22 RX ORDER — THIAMINE HYDROCHLORIDE 100 MG/ML
100 INJECTION, SOLUTION INTRAMUSCULAR; INTRAVENOUS
Status: COMPLETED | OUTPATIENT
Start: 2020-07-22 | End: 2020-07-22

## 2020-07-22 RX ORDER — MAG HYDROX/ALUMINUM HYD/SIMETH 200-200-20
30 SUSPENSION, ORAL (FINAL DOSE FORM) ORAL
Status: COMPLETED | OUTPATIENT
Start: 2020-07-22 | End: 2020-07-22

## 2020-07-22 RX ORDER — ONDANSETRON 2 MG/ML
4 INJECTION INTRAMUSCULAR; INTRAVENOUS
Status: COMPLETED | OUTPATIENT
Start: 2020-07-22 | End: 2020-07-22

## 2020-07-22 RX ADMIN — LORAZEPAM 1 MG: 2 INJECTION INTRAMUSCULAR; INTRAVENOUS at 07:07

## 2020-07-22 RX ADMIN — ONDANSETRON 4 MG: 2 INJECTION INTRAMUSCULAR; INTRAVENOUS at 07:07

## 2020-07-22 RX ADMIN — SODIUM CHLORIDE, SODIUM LACTATE, POTASSIUM CHLORIDE, AND CALCIUM CHLORIDE 1000 ML: .6; .31; .03; .02 INJECTION, SOLUTION INTRAVENOUS at 05:07

## 2020-07-22 RX ADMIN — THIAMINE HYDROCHLORIDE 100 MG: 100 INJECTION, SOLUTION INTRAMUSCULAR; INTRAVENOUS at 05:07

## 2020-07-22 RX ADMIN — PANTOPRAZOLE SODIUM 40 MG: 40 INJECTION, POWDER, LYOPHILIZED, FOR SOLUTION INTRAVENOUS at 04:07

## 2020-07-22 RX ADMIN — ONDANSETRON 4 MG: 2 INJECTION INTRAMUSCULAR; INTRAVENOUS at 04:07

## 2020-07-22 RX ADMIN — ALUMINUM HYDROXIDE, MAGNESIUM HYDROXIDE, AND SIMETHICONE 30 ML: 200; 200; 20 SUSPENSION ORAL at 05:07

## 2020-07-22 RX ADMIN — LORAZEPAM 1 MG: 2 INJECTION INTRAMUSCULAR; INTRAVENOUS at 04:07

## 2020-07-22 NOTE — ED PROVIDER NOTES
Encounter Date: 7/22/2020       History     Chief Complaint   Patient presents with    Nausea     63 y/o M with PMH of alcohol abuse, alcoholic liver disease, depression here with c/o constant, moderate, brown tinged vomiting. This started 2 days ago when the patient stopped drinking so he could work. He typically drinks for a week while he is off, and stops when he has to work. He has been unable to take his benzodiazepines this week, and his depression medication due to the nausea and vomiting. Reports associated epigastric pain, and nausea. Patient denies any black or bloody stool, loose stools, chest pain.         Review of patient's allergies indicates:  No Known Allergies  Past Medical History:   Diagnosis Date    Alcohol abuse     Alcohol dependence     Anxiety     Depression     History of suicidal ideation      History reviewed. No pertinent surgical history.  History reviewed. No pertinent family history.  Social History     Tobacco Use    Smoking status: Current Every Day Smoker     Packs/day: 1.00     Types: Cigarettes    Smokeless tobacco: Never Used   Substance Use Topics    Alcohol use: Yes    Drug use: No     Review of Systems   Constitutional: Positive for diaphoresis. Negative for chills and fever.   HENT: Negative for congestion and dental problem.    Eyes: Negative for pain and visual disturbance.   Respiratory: Negative for cough and shortness of breath.    Cardiovascular: Negative for chest pain and palpitations.   Gastrointestinal: Positive for constipation, nausea and vomiting. Negative for abdominal pain, blood in stool and diarrhea.   Genitourinary: Negative for dysuria and flank pain.   Musculoskeletal: Negative for back pain and neck pain.   Skin: Negative for rash and wound.   Neurological: Negative for weakness, numbness and headaches.       Physical Exam     Initial Vitals [07/22/20 1625]   BP Pulse Resp Temp SpO2   123/74 110 20 99 °F (37.2 °C) 97 %      MAP       --          Physical Exam    Constitutional: He appears well-developed and well-nourished. He is diaphoretic.   HENT:   Head: Normocephalic and atraumatic.   Mouth/Throat: Oropharynx is clear and moist.   Eyes: EOM are normal. Pupils are equal, round, and reactive to light.   Neck: Normal range of motion. Neck supple.   Cardiovascular: Regular rhythm.   Tachycardic   Pulmonary/Chest: Breath sounds normal. No respiratory distress.   Abdominal: He exhibits mass. He exhibits no distension. There is abdominal tenderness. There is no rebound and no guarding.   Palpable liver. Epigastric tenderness.    Musculoskeletal: Normal range of motion. No tenderness.   Neurological: He is alert and oriented to person, place, and time. He has normal strength. No sensory deficit.   Mild tremor.    Skin: Skin is warm.   Psychiatric: He has a normal mood and affect.         ED Course   Procedures  Labs Reviewed   CBC W/ AUTO DIFFERENTIAL - Abnormal; Notable for the following components:       Result Value    Mean Corpuscular Hemoglobin 35.0 (*)     MPV 8.8 (*)     Gran # (ANC) 9.3 (*)     Gran% 85.1 (*)     Lymph% 9.5 (*)     All other components within normal limits   COMPREHENSIVE METABOLIC PANEL   LIPASE   TROPONIN I   PROTIME-INR   SARS-COV-2 RNA AMPLIFICATION, QUAL     EKG Readings: (Independently Interpreted)   133 BPM. Sinus tachycardia. Normal axis. Normal [place], QRs, and QTc. No STEMI.        Imaging Results    None          Medical Decision Making:   ED Management:  1930: Pt with mild tremulousness present and mild nausea persists. Appears comfortable. No hallucinations or confusion. Discussed options of admission vs discharge as patient does have meds at home for withdrawal symptoms (Ativan).  Following second dose of Ativan, patient was markedly improved and discussion with pt and spouse suggested he could be treated at home as symptoms are very familiar to him.  Aware of his chronic alcohol problem and need for outpatient counseling  and advised he would follow with his PCP for further disposition. Not clinically indicated to admit as symptoms much better and has Ativan at home to detox with over next 2 days taking Ativan Q8H x 2 days and refrain from alcohol.                   ED Course as of Jul 22 1947 Wed Jul 22, 2020   [id number] Tony LOZA M.D., am handing off care of the patient to oncoming physician: Dr. Murillo.   We have discussed patient history, exam, and workup and treatment thus far, as well as potential disposition options. They understand, and will take over management from here forward.           [BA]      ED Course User Index  [BA] Tony Alamo MD                Clinical Impression:       ICD-10-CM ICD-9-CM   1. Non-intractable vomiting with nausea, unspecified vomiting type  R11.2 787.01   2. Alcohol withdrawal syndrome without complication  F10.230 291.81   3. Alcohol use disorder, moderate, dependence  F10.20 303.90                                Kunal Murillo MD  07/27/20 3041

## 2020-07-23 NOTE — DISCHARGE INSTRUCTIONS
Take Ativan every 8 hours for next 48 hours  Use Zofran as needed for nausea.  Take multivitamin daily.  Call Dr. Paul office tomorrow for scheduling followup appointment

## 2020-07-27 ENCOUNTER — HOSPITAL ENCOUNTER (EMERGENCY)
Facility: HOSPITAL | Age: 65
Discharge: HOME | End: 2020-07-27
Attending: EMERGENCY MEDICINE
Payer: COMMERCIAL

## 2020-07-27 VITALS
TEMPERATURE: 99 F | DIASTOLIC BLOOD PRESSURE: 67 MMHG | SYSTOLIC BLOOD PRESSURE: 115 MMHG | BODY MASS INDEX: 27.02 KG/M2 | OXYGEN SATURATION: 97 % | RESPIRATION RATE: 18 BRPM | HEART RATE: 88 BPM | WEIGHT: 183 LBS

## 2020-07-27 DIAGNOSIS — R11.2 NON-INTRACTABLE VOMITING WITH NAUSEA, UNSPECIFIED VOMITING TYPE: ICD-10-CM

## 2020-07-27 DIAGNOSIS — F10.10 ALCOHOL ABUSE: Primary | ICD-10-CM

## 2020-07-27 DIAGNOSIS — F10.20 ALCOHOL DEPENDENCE, BINGE PATTERN: ICD-10-CM

## 2020-07-27 LAB
ALBUMIN SERPL BCP-MCNC: 4.1 G/DL (ref 3.5–5.2)
ALP SERPL-CCNC: 84 U/L (ref 55–135)
ALT SERPL W/O P-5'-P-CCNC: 85 U/L (ref 10–44)
ANION GAP SERPL CALC-SCNC: 16 MMOL/L (ref 8–16)
AST SERPL-CCNC: 73 U/L (ref 10–40)
BASOPHILS # BLD AUTO: 0.02 K/UL (ref 0–0.2)
BASOPHILS NFR BLD: 0.3 % (ref 0–1.9)
BILIRUB SERPL-MCNC: 0.7 MG/DL (ref 0.1–1)
BILIRUB UR QL STRIP: ABNORMAL
BUN SERPL-MCNC: 10 MG/DL (ref 8–23)
CALCIUM SERPL-MCNC: 9 MG/DL (ref 8.7–10.5)
CHLORIDE SERPL-SCNC: 99 MMOL/L (ref 95–110)
CLARITY UR REFRACT.AUTO: CLEAR
CO2 SERPL-SCNC: 23 MMOL/L (ref 23–29)
COLOR UR AUTO: YELLOW
CREAT SERPL-MCNC: 0.9 MG/DL (ref 0.5–1.4)
DIFFERENTIAL METHOD: ABNORMAL
EOSINOPHIL # BLD AUTO: 0 K/UL (ref 0–0.5)
EOSINOPHIL NFR BLD: 0.5 % (ref 0–8)
ERYTHROCYTE [DISTWIDTH] IN BLOOD BY AUTOMATED COUNT: 14 % (ref 11.5–14.5)
EST. GFR  (AFRICAN AMERICAN): >60 ML/MIN/1.73 M^2
EST. GFR  (NON AFRICAN AMERICAN): >60 ML/MIN/1.73 M^2
ETHANOL SERPL-MCNC: 102 MG/DL
GLUCOSE SERPL-MCNC: 85 MG/DL (ref 70–110)
GLUCOSE UR QL STRIP: NEGATIVE
HCT VFR BLD AUTO: 38.7 % (ref 40–54)
HGB BLD-MCNC: 13.8 G/DL (ref 14–18)
HGB UR QL STRIP: NEGATIVE
IMM GRANULOCYTES # BLD AUTO: 0.02 K/UL (ref 0–0.04)
IMM GRANULOCYTES NFR BLD AUTO: 0.3 % (ref 0–0.5)
KETONES UR QL STRIP: ABNORMAL
LEUKOCYTE ESTERASE UR QL STRIP: NEGATIVE
LIPASE SERPL-CCNC: 33 U/L (ref 4–60)
LYMPHOCYTES # BLD AUTO: 1.9 K/UL (ref 1–4.8)
LYMPHOCYTES NFR BLD: 32.3 % (ref 18–48)
MCH RBC QN AUTO: 35.2 PG (ref 27–31)
MCHC RBC AUTO-ENTMCNC: 35.7 G/DL (ref 32–36)
MCV RBC AUTO: 99 FL (ref 82–98)
MONOCYTES # BLD AUTO: 0.6 K/UL (ref 0.3–1)
MONOCYTES NFR BLD: 9.4 % (ref 4–15)
NEUTROPHILS # BLD AUTO: 3.3 K/UL (ref 1.8–7.7)
NEUTROPHILS NFR BLD: 57.2 % (ref 38–73)
NITRITE UR QL STRIP: NEGATIVE
NRBC BLD-RTO: 0 /100 WBC
PH UR STRIP: 7 [PH] (ref 5–8)
PLATELET # BLD AUTO: 133 K/UL (ref 150–350)
PMV BLD AUTO: 9 FL (ref 9.2–12.9)
POTASSIUM SERPL-SCNC: 3.2 MMOL/L (ref 3.5–5.1)
PROT SERPL-MCNC: 7.3 G/DL (ref 6–8.4)
PROT UR QL STRIP: NEGATIVE
RBC # BLD AUTO: 3.92 M/UL (ref 4.6–6.2)
SODIUM SERPL-SCNC: 138 MMOL/L (ref 136–145)
SP GR UR STRIP: 1.01 (ref 1–1.03)
URN SPEC COLLECT METH UR: ABNORMAL
UROBILINOGEN UR STRIP-ACNC: <2 EU/DL
WBC # BLD AUTO: 5.85 K/UL (ref 3.9–12.7)

## 2020-07-27 PROCEDURE — 85025 COMPLETE CBC W/AUTO DIFF WBC: CPT | Mod: ER

## 2020-07-27 PROCEDURE — 80320 DRUG SCREEN QUANTALCOHOLS: CPT | Mod: ER

## 2020-07-27 PROCEDURE — 81003 URINALYSIS AUTO W/O SCOPE: CPT | Mod: ER

## 2020-07-27 PROCEDURE — 80053 COMPREHEN METABOLIC PANEL: CPT | Mod: ER

## 2020-07-27 PROCEDURE — 25000003 PHARM REV CODE 250: Mod: ER | Performed by: EMERGENCY MEDICINE

## 2020-07-27 PROCEDURE — 96361 HYDRATE IV INFUSION ADD-ON: CPT | Mod: ER

## 2020-07-27 PROCEDURE — 63600175 PHARM REV CODE 636 W HCPCS: Mod: ER | Performed by: EMERGENCY MEDICINE

## 2020-07-27 PROCEDURE — 99284 EMERGENCY DEPT VISIT MOD MDM: CPT | Mod: 25,ER

## 2020-07-27 PROCEDURE — 83690 ASSAY OF LIPASE: CPT | Mod: ER

## 2020-07-27 PROCEDURE — 96375 TX/PRO/DX INJ NEW DRUG ADDON: CPT | Mod: ER

## 2020-07-27 PROCEDURE — 96374 THER/PROPH/DIAG INJ IV PUSH: CPT | Mod: ER

## 2020-07-27 RX ORDER — LORAZEPAM 1 MG/1
1 TABLET ORAL 2 TIMES DAILY PRN
Qty: 6 TABLET | Refills: 0 | Status: ON HOLD | OUTPATIENT
Start: 2020-07-27 | End: 2020-09-13 | Stop reason: HOSPADM

## 2020-07-27 RX ORDER — ONDANSETRON 4 MG/1
4 TABLET, ORALLY DISINTEGRATING ORAL EVERY 6 HOURS PRN
Qty: 15 TABLET | Refills: 0 | OUTPATIENT
Start: 2020-07-27 | End: 2020-08-24

## 2020-07-27 RX ORDER — POTASSIUM CHLORIDE 20 MEQ/1
40 TABLET, EXTENDED RELEASE ORAL
Status: COMPLETED | OUTPATIENT
Start: 2020-07-27 | End: 2020-07-27

## 2020-07-27 RX ORDER — ONDANSETRON 2 MG/ML
4 INJECTION INTRAMUSCULAR; INTRAVENOUS
Status: COMPLETED | OUTPATIENT
Start: 2020-07-27 | End: 2020-07-27

## 2020-07-27 RX ADMIN — ONDANSETRON 4 MG: 2 INJECTION INTRAMUSCULAR; INTRAVENOUS at 04:07

## 2020-07-27 RX ADMIN — POTASSIUM CHLORIDE 40 MEQ: 20 TABLET, EXTENDED RELEASE ORAL at 05:07

## 2020-07-27 RX ADMIN — SODIUM CHLORIDE 1000 ML: 0.9 INJECTION, SOLUTION INTRAVENOUS at 04:07

## 2020-07-27 RX ADMIN — LORAZEPAM 1 MG: 2 INJECTION INTRAMUSCULAR; INTRAVENOUS at 04:07

## 2020-07-27 NOTE — ED PROVIDER NOTES
Encounter Date: 7/27/2020       History     Chief Complaint   Patient presents with    Abdominal Pain     Patient is a 64-year-old male who presents today with complaints of nausea/vomiting x2 days.  He admits to alcohol binge drinking.  He stopped 48 hr ago, states that he has had epigastric pain nausea/vomiting since.  He states he has multiple prior similar episodes.  States he gets IV fluids, antiemetics, and Ativan which helped his symptoms.  He states he is out of his Ativan.  Denies any tremors, anxiety, hallucinations seizures.  Denies diarrhea, constipation, fever/chills, dysuria, hematuria, chest pain, shortness of breath, and all symptoms.        Review of patient's allergies indicates:  No Known Allergies  Past Medical History:   Diagnosis Date    Alcohol abuse     Alcohol dependence     Anxiety     Depression     History of suicidal ideation      History reviewed. No pertinent surgical history.  History reviewed. No pertinent family history.  Social History     Tobacco Use    Smoking status: Current Every Day Smoker     Packs/day: 1.00     Types: Cigarettes    Smokeless tobacco: Never Used   Substance Use Topics    Alcohol use: Yes    Drug use: No     Review of Systems   Constitutional: Negative for chills, diaphoresis and fever.   HENT: Negative for congestion, rhinorrhea and sore throat.    Eyes: Negative for pain, redness and visual disturbance.   Respiratory: Negative for cough and shortness of breath.    Cardiovascular: Negative for chest pain, palpitations and leg swelling.   Gastrointestinal: Positive for abdominal pain (epigastric), nausea and vomiting. Negative for abdominal distention, blood in stool, constipation and diarrhea.   Genitourinary: Negative for dysuria and hematuria.   Musculoskeletal: Negative for arthralgias and joint swelling.   Skin: Negative for rash and wound.   Neurological: Negative for seizures, syncope and headaches.   All other systems reviewed and are  negative.      Physical Exam     Initial Vitals [07/27/20 0414]   BP Pulse Resp Temp SpO2   103/77 96 18 98.8 °F (37.1 °C) 95 %      MAP       --         Physical Exam    Nursing note and vitals reviewed.  Constitutional: He appears well-developed and well-nourished. No distress.   HENT:   Head: Normocephalic and atraumatic.   Mouth/Throat: Oropharynx is clear and moist.   Eyes: Conjunctivae and EOM are normal. Pupils are equal, round, and reactive to light.   Neck: Neck supple. No tracheal deviation present.   Cardiovascular: Normal rate, regular rhythm, normal heart sounds and intact distal pulses.   Pulmonary/Chest: Breath sounds normal. No respiratory distress.   Abdominal: Soft. He exhibits no distension. There is abdominal tenderness (epigastric). There is no rebound and no guarding.   Negative Cuello's.  Negative McBurney's   Musculoskeletal: Normal range of motion. No tenderness or edema.   Neurological: He is alert and oriented to person, place, and time.   No focal deficits   Skin: Skin is warm. No rash noted. No erythema.   Psychiatric: He has a normal mood and affect. His behavior is normal.         ED Course   Procedures  Labs Reviewed   CBC W/ AUTO DIFFERENTIAL - Abnormal; Notable for the following components:       Result Value    RBC 3.92 (*)     Hemoglobin 13.8 (*)     Hematocrit 38.7 (*)     Mean Corpuscular Volume 99 (*)     Mean Corpuscular Hemoglobin 35.2 (*)     Platelets 133 (*)     MPV 9.0 (*)     All other components within normal limits   COMPREHENSIVE METABOLIC PANEL - Abnormal; Notable for the following components:    Potassium 3.2 (*)     AST 73 (*)     ALT 85 (*)     All other components within normal limits   ALCOHOL,MEDICAL (ETHANOL) - Abnormal; Notable for the following components:    Alcohol, Medical, Serum 102 (*)     All other components within normal limits   URINALYSIS, REFLEX TO URINE CULTURE - Abnormal; Notable for the following components:    Ketones, UA Trace (*)      Bilirubin (UA) 1+ (*)     All other components within normal limits    Narrative:     Specimen Source->Urine   LIPASE     Results for orders placed or performed during the hospital encounter of 07/27/20   CBC auto differential   Result Value Ref Range    WBC 5.85 3.90 - 12.70 K/uL    RBC 3.92 (L) 4.60 - 6.20 M/uL    Hemoglobin 13.8 (L) 14.0 - 18.0 g/dL    Hematocrit 38.7 (L) 40.0 - 54.0 %    Mean Corpuscular Volume 99 (H) 82 - 98 fL    Mean Corpuscular Hemoglobin 35.2 (H) 27.0 - 31.0 pg    Mean Corpuscular Hemoglobin Conc 35.7 32.0 - 36.0 g/dL    RDW 14.0 11.5 - 14.5 %    Platelets 133 (L) 150 - 350 K/uL    MPV 9.0 (L) 9.2 - 12.9 fL    Immature Granulocytes 0.3 0.0 - 0.5 %    Gran # (ANC) 3.3 1.8 - 7.7 K/uL    Immature Grans (Abs) 0.02 0.00 - 0.04 K/uL    Lymph # 1.9 1.0 - 4.8 K/uL    Mono # 0.6 0.3 - 1.0 K/uL    Eos # 0.0 0.0 - 0.5 K/uL    Baso # 0.02 0.00 - 0.20 K/uL    nRBC 0 0 /100 WBC    Gran% 57.2 38.0 - 73.0 %    Lymph% 32.3 18.0 - 48.0 %    Mono% 9.4 4.0 - 15.0 %    Eosinophil% 0.5 0.0 - 8.0 %    Basophil% 0.3 0.0 - 1.9 %    Differential Method Automated    Comprehensive metabolic panel   Result Value Ref Range    Sodium 138 136 - 145 mmol/L    Potassium 3.2 (L) 3.5 - 5.1 mmol/L    Chloride 99 95 - 110 mmol/L    CO2 23 23 - 29 mmol/L    Glucose 85 70 - 110 mg/dL    BUN, Bld 10 8 - 23 mg/dL    Creatinine 0.9 0.5 - 1.4 mg/dL    Calcium 9.0 8.7 - 10.5 mg/dL    Total Protein 7.3 6.0 - 8.4 g/dL    Albumin 4.1 3.5 - 5.2 g/dL    Total Bilirubin 0.7 0.1 - 1.0 mg/dL    Alkaline Phosphatase 84 55 - 135 U/L    AST 73 (H) 10 - 40 U/L    ALT 85 (H) 10 - 44 U/L    Anion Gap 16 8 - 16 mmol/L    eGFR if African American >60.0 >60 mL/min/1.73 m^2    eGFR if non African American >60.0 >60 mL/min/1.73 m^2   Lipase   Result Value Ref Range    Lipase 33 4 - 60 U/L   Ethanol   Result Value Ref Range    Alcohol, Medical, Serum 102 (H) <10 mg/dL   Urinalysis, Reflex to Urine Culture Urine, Clean Catch    Specimen: Urine   Result  Value Ref Range    Specimen UA Urine, Clean Catch     Color, UA Yellow Yellow, Straw, Daily    Appearance, UA Clear Clear    pH, UA 7.0 5.0 - 8.0    Specific Gravity, UA 1.015 1.005 - 1.030    Protein, UA Negative Negative    Glucose, UA Negative Negative    Ketones, UA Trace (A) Negative    Bilirubin (UA) 1+ (A) Negative    Occult Blood UA Negative Negative    Nitrite, UA Negative Negative    Urobilinogen, UA <2.0 <2.0 EU/dL    Leukocytes, UA Negative Negative     Medications   potassium chloride SA CR tablet 40 mEq (has no administration in time range)   sodium chloride 0.9% bolus 1,000 mL (1,000 mLs Intravenous New Bag 7/27/20 0435)   ondansetron injection 4 mg (4 mg Intravenous Given 7/27/20 0435)   lorazepam (ATIVAN) injection 1 mg (1 mg Intravenous Given 7/27/20 0435)            5:38 AM:  Patient tolerating p.o..  Patient resting comfortably and ready for discharge.  Patient is requesting a 3 day prescription for Ativan stating he cannot get a refill his Ativan until 3 days from.  Review of [facility] records supports this. 3 day px provided.  Patient agreed not to combine and Ativan.  Zofran prescription refilled as well                                       Clinical Impression:   Final diagnoses:  [F10.10] Alcohol abuse (Primary)  [R11.2] Non-intractable vomiting with nausea, unspecified vomiting type          ED Disposition Condition    Discharge Stable        ED Prescriptions     Medication Sig Dispense Start Date End Date Auth. Provider    ondansetron (ZOFRAN-ODT) 4 MG TbDL Take 1 tablet (4 mg total) by mouth every 6 (six) hours as needed (NAUSEA/VOMITING). 15 tablet 7/27/2020  Bernabe Rose MD    LORazepam (ATIVAN) 1 MG tablet Take 1 tablet (1 mg total) by mouth 2 (two) times daily as needed (Anxiety/Tremors). 6 tablet 7/27/2020  Bernabe Rose MD        Follow-up Information    None                                    Bernabe Rose MD  07/27/20 4579

## 2020-07-27 NOTE — ED NOTES
Pt states no further needs/concerns. resp even, unlabored. No distress noted. Pt AAOx3. Pt tolerated PO challenge. Will d/c per MD order. No n/v while in ED noted.

## 2020-08-09 ENCOUNTER — HOSPITAL ENCOUNTER (EMERGENCY)
Facility: HOSPITAL | Age: 65
Discharge: HOME | End: 2020-08-09
Attending: EMERGENCY MEDICINE
Payer: COMMERCIAL

## 2020-08-09 VITALS
WEIGHT: 179 LBS | DIASTOLIC BLOOD PRESSURE: 71 MMHG | TEMPERATURE: 99 F | HEART RATE: 85 BPM | HEIGHT: 69 IN | SYSTOLIC BLOOD PRESSURE: 109 MMHG | OXYGEN SATURATION: 95 % | RESPIRATION RATE: 17 BRPM | BODY MASS INDEX: 26.51 KG/M2

## 2020-08-09 DIAGNOSIS — K29.20 CHRONIC ALCOHOLIC GASTRITIS WITHOUT HEMORRHAGE: Primary | ICD-10-CM

## 2020-08-09 DIAGNOSIS — F10.939 ALCOHOL WITHDRAWAL: ICD-10-CM

## 2020-08-09 LAB
ALBUMIN SERPL BCP-MCNC: 4.5 G/DL (ref 3.5–5.2)
ALP SERPL-CCNC: 77 U/L (ref 55–135)
ALT SERPL W/O P-5'-P-CCNC: 57 U/L (ref 10–44)
AMPHET+METHAMPHET UR QL: NEGATIVE
ANION GAP SERPL CALC-SCNC: 22 MMOL/L (ref 8–16)
AST SERPL-CCNC: 55 U/L (ref 10–40)
BACTERIA #/AREA URNS AUTO: ABNORMAL /HPF
BARBITURATES UR QL SCN>200 NG/ML: NEGATIVE
BASOPHILS # BLD AUTO: 0.06 K/UL (ref 0–0.2)
BASOPHILS NFR BLD: 1.2 % (ref 0–1.9)
BENZODIAZ UR QL SCN>200 NG/ML: NEGATIVE
BILIRUB SERPL-MCNC: 0.7 MG/DL (ref 0.1–1)
BILIRUB UR QL STRIP: NEGATIVE
BUN SERPL-MCNC: 8 MG/DL (ref 8–23)
BZE UR QL SCN: NEGATIVE
CALCIUM SERPL-MCNC: 8.8 MG/DL (ref 8.7–10.5)
CANNABINOIDS UR QL SCN: NEGATIVE
CHLORIDE SERPL-SCNC: 99 MMOL/L (ref 95–110)
CLARITY UR REFRACT.AUTO: CLEAR
CO2 SERPL-SCNC: 19 MMOL/L (ref 23–29)
COLOR UR AUTO: YELLOW
CREAT SERPL-MCNC: 0.8 MG/DL (ref 0.5–1.4)
CREAT UR-MCNC: 149.4 MG/DL (ref 23–375)
DIFFERENTIAL METHOD: ABNORMAL
EOSINOPHIL # BLD AUTO: 0.1 K/UL (ref 0–0.5)
EOSINOPHIL NFR BLD: 1.2 % (ref 0–8)
ERYTHROCYTE [DISTWIDTH] IN BLOOD BY AUTOMATED COUNT: 13.5 % (ref 11.5–14.5)
EST. GFR  (AFRICAN AMERICAN): >60 ML/MIN/1.73 M^2
EST. GFR  (NON AFRICAN AMERICAN): >60 ML/MIN/1.73 M^2
GLUCOSE SERPL-MCNC: 102 MG/DL (ref 70–110)
GLUCOSE UR QL STRIP: NEGATIVE
HCT VFR BLD AUTO: 42.3 % (ref 40–54)
HGB BLD-MCNC: 15 G/DL (ref 14–18)
HGB UR QL STRIP: ABNORMAL
HYALINE CASTS UR QL AUTO: 2 /LPF
IMM GRANULOCYTES # BLD AUTO: 0.01 K/UL (ref 0–0.04)
IMM GRANULOCYTES NFR BLD AUTO: 0.2 % (ref 0–0.5)
INR PPP: 1 (ref 0.8–1.2)
KETONES UR QL STRIP: NEGATIVE
LEUKOCYTE ESTERASE UR QL STRIP: NEGATIVE
LIPASE SERPL-CCNC: 35 U/L (ref 4–60)
LYMPHOCYTES # BLD AUTO: 1.7 K/UL (ref 1–4.8)
LYMPHOCYTES NFR BLD: 33.4 % (ref 18–48)
MCH RBC QN AUTO: 34.9 PG (ref 27–31)
MCHC RBC AUTO-ENTMCNC: 35.5 G/DL (ref 32–36)
MCV RBC AUTO: 98 FL (ref 82–98)
METHADONE UR QL SCN>300 NG/ML: NEGATIVE
MICROSCOPIC COMMENT: ABNORMAL
MONOCYTES # BLD AUTO: 0.5 K/UL (ref 0.3–1)
MONOCYTES NFR BLD: 9.2 % (ref 4–15)
NEUTROPHILS # BLD AUTO: 2.9 K/UL (ref 1.8–7.7)
NEUTROPHILS NFR BLD: 54.8 % (ref 38–73)
NITRITE UR QL STRIP: NEGATIVE
NRBC BLD-RTO: 0 /100 WBC
OPIATES UR QL SCN: NEGATIVE
PCP UR QL SCN>25 NG/ML: NEGATIVE
PH UR STRIP: 6 [PH] (ref 5–8)
PLATELET # BLD AUTO: 266 K/UL (ref 150–350)
PMV BLD AUTO: 8.3 FL (ref 9.2–12.9)
POTASSIUM SERPL-SCNC: 4 MMOL/L (ref 3.5–5.1)
PROT SERPL-MCNC: 8.1 G/DL (ref 6–8.4)
PROT UR QL STRIP: ABNORMAL
PROTHROMBIN TIME: 10 SEC (ref 9–12.5)
RBC # BLD AUTO: 4.3 M/UL (ref 4.6–6.2)
RBC #/AREA URNS AUTO: 2 /HPF (ref 0–4)
SODIUM SERPL-SCNC: 140 MMOL/L (ref 136–145)
SP GR UR STRIP: 1.02 (ref 1–1.03)
SQUAMOUS #/AREA URNS AUTO: 1 /HPF
TOXICOLOGY INFORMATION: NORMAL
URN SPEC COLLECT METH UR: ABNORMAL
UROBILINOGEN UR STRIP-ACNC: NEGATIVE EU/DL
WBC # BLD AUTO: 5.21 K/UL (ref 3.9–12.7)
WBC #/AREA URNS AUTO: 1 /HPF (ref 0–5)

## 2020-08-09 PROCEDURE — 93005 ELECTROCARDIOGRAM TRACING: CPT | Mod: ER

## 2020-08-09 PROCEDURE — 85025 COMPLETE CBC W/AUTO DIFF WBC: CPT | Mod: ER

## 2020-08-09 PROCEDURE — 93010 EKG 12-LEAD: ICD-10-PCS | Mod: ,,, | Performed by: INTERNAL MEDICINE

## 2020-08-09 PROCEDURE — 85610 PROTHROMBIN TIME: CPT | Mod: ER

## 2020-08-09 PROCEDURE — 80053 COMPREHEN METABOLIC PANEL: CPT | Mod: ER

## 2020-08-09 PROCEDURE — 96375 TX/PRO/DX INJ NEW DRUG ADDON: CPT | Mod: ER

## 2020-08-09 PROCEDURE — 96365 THER/PROPH/DIAG IV INF INIT: CPT | Mod: ER

## 2020-08-09 PROCEDURE — 83690 ASSAY OF LIPASE: CPT | Mod: ER

## 2020-08-09 PROCEDURE — 80307 DRUG TEST PRSMV CHEM ANLYZR: CPT | Mod: ER

## 2020-08-09 PROCEDURE — 25000003 PHARM REV CODE 250: Mod: ER | Performed by: EMERGENCY MEDICINE

## 2020-08-09 PROCEDURE — 96361 HYDRATE IV INFUSION ADD-ON: CPT | Mod: ER

## 2020-08-09 PROCEDURE — 96372 THER/PROPH/DIAG INJ SC/IM: CPT | Mod: ER

## 2020-08-09 PROCEDURE — 93010 ELECTROCARDIOGRAM REPORT: CPT | Mod: ,,, | Performed by: INTERNAL MEDICINE

## 2020-08-09 PROCEDURE — 81000 URINALYSIS NONAUTO W/SCOPE: CPT | Mod: 59,ER

## 2020-08-09 PROCEDURE — 96368 THER/DIAG CONCURRENT INF: CPT | Mod: ER

## 2020-08-09 PROCEDURE — 99284 EMERGENCY DEPT VISIT MOD MDM: CPT | Mod: 25,ER

## 2020-08-09 PROCEDURE — 63600175 PHARM REV CODE 636 W HCPCS: Mod: ER | Performed by: EMERGENCY MEDICINE

## 2020-08-09 RX ORDER — ONDANSETRON 4 MG/1
4 TABLET, FILM COATED ORAL EVERY 6 HOURS PRN
Qty: 12 TABLET | Refills: 0 | Status: SHIPPED | OUTPATIENT
Start: 2020-08-09 | End: 2020-08-24 | Stop reason: ALTCHOICE

## 2020-08-09 RX ORDER — ONDANSETRON 2 MG/ML
8 INJECTION INTRAMUSCULAR; INTRAVENOUS
Status: COMPLETED | OUTPATIENT
Start: 2020-08-09 | End: 2020-08-09

## 2020-08-09 RX ORDER — THIAMINE HYDROCHLORIDE 100 MG/ML
100 INJECTION, SOLUTION INTRAMUSCULAR; INTRAVENOUS
Status: COMPLETED | OUTPATIENT
Start: 2020-08-09 | End: 2020-08-09

## 2020-08-09 RX ADMIN — LORAZEPAM 1 MG: 2 INJECTION INTRAMUSCULAR; INTRAVENOUS at 12:08

## 2020-08-09 RX ADMIN — THIAMINE HYDROCHLORIDE 100 MG: 100 INJECTION, SOLUTION INTRAMUSCULAR; INTRAVENOUS at 02:08

## 2020-08-09 RX ADMIN — SODIUM CHLORIDE 1000 ML: 0.9 INJECTION, SOLUTION INTRAVENOUS at 12:08

## 2020-08-09 RX ADMIN — ONDANSETRON 8 MG: 2 INJECTION INTRAMUSCULAR; INTRAVENOUS at 12:08

## 2020-08-09 RX ADMIN — THIAMINE HYDROCHLORIDE 100 MG: 100 INJECTION, SOLUTION INTRAMUSCULAR; INTRAVENOUS at 12:08

## 2020-08-09 RX ADMIN — ASCORBIC ACID, VITAMIN A PALMITATE, CHOLECALCIFEROL, THIAMINE HYDROCHLORIDE, RIBOFLAVIN-5 PHOSPHATE SODIUM, PYRIDOXINE HYDROCHLORIDE, NIACINAMIDE, DEXPANTHENOL, ALPHA-TOCOPHEROL ACETATE, VITAMIN K1, FOLIC ACID, BIOTIN, CYANOCOBALAMIN: 200; 3300; 200; 6; 3.6; 6; 40; 15; 10; 150; 600; 60; 5 INJECTION, SOLUTION INTRAVENOUS at 12:08

## 2020-08-10 NOTE — ED PROVIDER NOTES
Encounter Date: 8/9/2020       History     Chief Complaint   Patient presents with    Vomiting     x 3 days, quit drinking.     Chief complaint:  Vomiting    History of present illness:  This is a recurrent problem.  Patient is a 64-year-old male with a history of significant alcohol abuse in the past.  Patient with multiple visits for nausea vomiting related to his alcohol both while drinking and when he has stopped drinking for a few days.  He states he last drank on Wednesday which was 4 days ago.  Generally drinks bourbon.  Patient is noncompliant with follow-up for detoxification in the past.  Denies any blood or black coffee-ground in his vomitus.  Relates some mild epigastric pain.  No abdominal distension.  No complaints of diarrhea.  Denies any tremors or confusion/hallucinations.  Patient has Ativan at home that he generally takes for issues with alcohol withdrawal.        Review of patient's allergies indicates:  No Known Allergies  Past Medical History:   Diagnosis Date    Alcohol abuse     Alcohol dependence     Anxiety     Depression     History of suicidal ideation      History reviewed. No pertinent surgical history.  History reviewed. No pertinent family history.  Social History     Tobacco Use    Smoking status: Current Every Day Smoker     Packs/day: 1.00     Types: Cigarettes    Smokeless tobacco: Never Used   Substance Use Topics    Alcohol use: Yes     Comment: last drink was wednesday    Drug use: No     Review of Systems   Constitutional: Negative for activity change, appetite change, chills and fever.   HENT: Negative.    Eyes: Negative.    Respiratory: Negative.    Cardiovascular: Negative.    Gastrointestinal: Positive for abdominal pain, nausea and vomiting. Negative for abdominal distention, blood in stool and diarrhea.        Mild epigastric pain   Genitourinary: Negative.    Musculoskeletal: Negative for back pain, gait problem, myalgias, neck pain and neck stiffness.   Skin:  Negative.    Neurological: Negative for dizziness, tremors and headaches.   Psychiatric/Behavioral: Negative for agitation and hallucinations. The patient is not nervous/anxious.    All other systems reviewed and are negative.      Physical Exam     Initial Vitals [08/09/20 1133]   BP Pulse Resp Temp SpO2   116/83 107 19 99.1 °F (37.3 °C) 96 %      MAP       --         Physical Exam    Nursing note and vitals reviewed.  Constitutional: He appears well-developed and well-nourished. He is not diaphoretic. No distress.   HENT:   Head: Normocephalic and atraumatic.   Right Ear: External ear normal.   Left Ear: External ear normal.   Nose: Nose normal.   Mouth/Throat: Oropharynx is clear and moist.   Eyes: Conjunctivae and EOM are normal.   Neck: Normal range of motion. Neck supple.   Cardiovascular: Normal rate, regular rhythm and intact distal pulses.   Pulmonary/Chest: Breath sounds normal. No respiratory distress.   Abdominal: Soft. Bowel sounds are normal. He exhibits no distension. There is abdominal tenderness. There is no rebound and no guarding.   Musculoskeletal: Normal range of motion.   Neurological: He is alert and oriented to person, place, and time. He has normal strength. He displays normal reflexes. No cranial nerve deficit or sensory deficit. GCS score is 15. GCS eye subscore is 4. GCS verbal subscore is 5. GCS motor subscore is 6.   Skin: Skin is warm and dry. Rash: Mild epigastric.   Psychiatric: His behavior is normal.         ED Course   Procedures  Labs Reviewed   CBC W/ AUTO DIFFERENTIAL - Abnormal; Notable for the following components:       Result Value    RBC 4.30 (*)     Mean Corpuscular Hemoglobin 34.9 (*)     MPV 8.3 (*)     All other components within normal limits   COMPREHENSIVE METABOLIC PANEL - Abnormal; Notable for the following components:    CO2 19 (*)     AST 55 (*)     ALT 57 (*)     Anion Gap 22 (*)     All other components within normal limits   URINALYSIS - Abnormal; Notable for  the following components:    Protein, UA 1+ (*)     Occult Blood UA Trace (*)     All other components within normal limits   URINALYSIS MICROSCOPIC - Abnormal; Notable for the following components:    Hyaline Casts, UA 2 (*)     All other components within normal limits   DRUG SCREEN PANEL, URINE EMERGENCY    Narrative:     Specimen Source->Urine   PROTIME-INR   LIPASE        ECG Results          EKG 12-lead (Final result)  Result time 08/09/20 15:03:23    Final result by Interface, Lab In [facility] (08/09/20 15:03:23)                 Narrative:    Test Reason : F10.239,    Vent. Rate : 087 BPM     Atrial Rate : 087 BPM     P-R Int : 140 ms          QRS Dur : 094 ms      QT Int : 368 ms       P-R-T Axes : 074 078 067 degrees     QTc Int : 442 ms    Sinus rhythm with Premature atrial complexes with Aberrant conduction  Incomplete right bundle branch block  Borderline Abnormal ECG  When compared with ECG of 22-JUL-2020 16:47,  Aberrant conduction is now Present  Vent. rate has decreased BY  46 BPM  Confirmed by MD JOSE ANTONIO, PA ([id number]) on 8/9/2020 3:03:10 PM    Referred By: WILY   SELF           Confirmed By:PA BRADY MD                            Imaging Results    None          Medical Decision Making:   Clinical Tests:   Lab Tests: Ordered and Reviewed  The following lab test(s) were unremarkable: CBC, CMP and Lipase  ED Management:  The patient sustained a brisk positive response to his IV fluids and antinausea medications.  Continues to be free of tremors.  Mental status is appropriate at this time with no signs of confusion or hallucinations.  Patient advised regarding his recalcitrant and dealing with his alcohol problem and is advised to follow-up with his primary care doctor for appropriate referrals.                                 Clinical Impression:         ICD-10-CM ICD-9-CM   1. 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 Alcohol withdrawal  F10.239 291.81        ED Disposition  Condition    Discharge Stable        ED Prescriptions     Medication Sig Dispense Start Date End Date Auth. Provider    ondansetron (ZOFRAN) 4 MG tablet Take 1 tablet (4 mg total) by mouth every 6 (six) hours as needed for Nausea. 12 tablet 8/9/2020  Kunal Murillo MD        Follow-up Information     Follow up With Specialties Details Why Contact Info    Tony Paul, DO Family Medicine Schedule an appointment as soon as possible for a visit in 2 days  [address] [place] [id number]  [phone]      [facility] Emergency Medicine  If symptoms worsen [address] [place] [address]  [facility] [id number] [phone]                                     Kunal Murillo MD  08/10/20 0657

## 2020-08-11 ENCOUNTER — TELEPHONE (OUTPATIENT)
Dept: RADIOLOGY | Facility: HOSPITAL | Age: 65
End: 2020-08-11

## 2020-08-11 ENCOUNTER — PATIENT OUTREACH (OUTPATIENT)
Dept: ADMINISTRATIVE | Facility: OTHER | Age: 65
End: 2020-08-11

## 2020-08-12 ENCOUNTER — OFFICE VISIT (OUTPATIENT)
Dept: GASTROENTEROLOGY | Facility: CLINIC | Age: 65
End: 2020-08-12
Payer: COMMERCIAL

## 2020-08-12 ENCOUNTER — LAB VISIT (OUTPATIENT)
Dept: LAB | Facility: HOSPITAL | Age: 65
End: 2020-08-12
Attending: INTERNAL MEDICINE
Payer: COMMERCIAL

## 2020-08-12 ENCOUNTER — HOSPITAL ENCOUNTER (OUTPATIENT)
Dept: RADIOLOGY | Facility: HOSPITAL | Age: 65
Discharge: HOME | End: 2020-08-12
Attending: INTERNAL MEDICINE
Payer: COMMERCIAL

## 2020-08-12 VITALS
BODY MASS INDEX: 26.94 KG/M2 | HEART RATE: 80 BPM | SYSTOLIC BLOOD PRESSURE: 104 MMHG | DIASTOLIC BLOOD PRESSURE: 72 MMHG | WEIGHT: 181.88 LBS | HEIGHT: 69 IN

## 2020-08-12 DIAGNOSIS — K76.0 FATTY LIVER: ICD-10-CM

## 2020-08-12 DIAGNOSIS — R79.89 ABNORMAL LFTS: ICD-10-CM

## 2020-08-12 DIAGNOSIS — R79.89 ELEVATED FERRITIN: ICD-10-CM

## 2020-08-12 DIAGNOSIS — E88.01 LOW PLASMA ALPHA-1 ANTITRYPSIN: ICD-10-CM

## 2020-08-12 DIAGNOSIS — R79.0 ABNORMAL IRON SATURATION: ICD-10-CM

## 2020-08-12 DIAGNOSIS — E88.01 LOW PLASMA ALPHA-1 ANTITRYPSIN: Primary | ICD-10-CM

## 2020-08-12 DIAGNOSIS — K70.9 ALCOHOLIC LIVER DISEASE: ICD-10-CM

## 2020-08-12 PROCEDURE — 76705 ECHO EXAM OF ABDOMEN: CPT | Mod: 26,,, | Performed by: RADIOLOGY

## 2020-08-12 PROCEDURE — 3008F BODY MASS INDEX DOCD: CPT | Mod: CPTII,S$GLB,, | Performed by: INTERNAL MEDICINE

## 2020-08-12 PROCEDURE — 76705 ECHO EXAM OF ABDOMEN: CPT | Mod: TC

## 2020-08-12 PROCEDURE — 82103 ALPHA-1-ANTITRYPSIN TOTAL: CPT

## 2020-08-12 PROCEDURE — 3008F PR BODY MASS INDEX (BMI) DOCUMENTED: ICD-10-PCS | Mod: CPTII,S$GLB,, | Performed by: INTERNAL MEDICINE

## 2020-08-12 PROCEDURE — 81256 HFE GENE: CPT

## 2020-08-12 PROCEDURE — 99999 PR PBB SHADOW E&M-EST. PATIENT-LVL IV: ICD-10-PCS | Mod: PBBFAC,,, | Performed by: INTERNAL MEDICINE

## 2020-08-12 PROCEDURE — 99213 PR OFFICE/OUTPT VISIT, EST, LEVL III, 20-29 MIN: ICD-10-PCS | Mod: S$GLB,,, | Performed by: INTERNAL MEDICINE

## 2020-08-12 PROCEDURE — 99213 OFFICE O/P EST LOW 20 MIN: CPT | Mod: S$GLB,,, | Performed by: INTERNAL MEDICINE

## 2020-08-12 PROCEDURE — 99999 PR PBB SHADOW E&M-EST. PATIENT-LVL IV: CPT | Mod: PBBFAC,,, | Performed by: INTERNAL MEDICINE

## 2020-08-12 PROCEDURE — 76705 US ABDOMEN LIMITED: ICD-10-PCS | Mod: 26,,, | Performed by: RADIOLOGY

## 2020-08-12 RX ORDER — ESCITALOPRAM OXALATE 10 MG/1
10 TABLET ORAL DAILY
COMMUNITY
Start: 2020-07-29

## 2020-08-12 NOTE — PROGRESS NOTES
Subjective:     Barb Carson is here for follow up of Elevated Hepatic Enzymes      HPI  Since Barb Carson's last visit he denies any major issues.  Overall has been feeling well.  He reports cutting back on his alcohol but still drinks about half a pt of alcohol every other day.    Fibroscan readin.1 KPa     Fibrosis:F 0-1      CAP readin dB/m     Steatosis: :S3         Interpretation:  Stage 0-1 fibrosis with grade 3 steatosis.       Review of Systems    Objective:     Physical Exam  Vitals signs reviewed.   Constitutional:       General: He is not in acute distress.     Appearance: He is well-developed.   HENT:      Head: Normocephalic and atraumatic.      Mouth/Throat:      Pharynx: No oropharyngeal exudate.   Eyes:      General: No scleral icterus.        Right eye: No discharge.         Left eye: No discharge.      Conjunctiva/sclera: Conjunctivae normal.      Pupils: Pupils are equal, round, and reactive to light.   Pulmonary:      Effort: Pulmonary effort is normal. No respiratory distress.      Breath sounds: Normal breath sounds. No wheezing.   Abdominal:      General: There is no distension.      Palpations: Abdomen is soft.      Tenderness: There is no abdominal tenderness.   Neurological:      Mental Status: He is alert and oriented to person, place, and time.   Psychiatric:         Behavior: Behavior normal.         MELD-Na score: 8 at 2020  1:44 PM  MELD score: 8 at 2020  1:44 PM  Calculated from:  Serum Creatinine: 1.0 mg/dL at 2020  1:44 PM  Serum Sodium: 138 mmol/L (Rounded to 137 mmol/L) at 2020  1:44 PM  Total Bilirubin: 1.6 mg/dL at 2020  1:44 PM  INR(ratio): 1.0 at 2020  1:44 PM  Age: 64 years 9 months    WBC   Date Value Ref Range Status   2020 4.55 3.90 - 12.70 K/uL Final     Hemoglobin   Date Value Ref Range Status   2020 13.8 (L) 14.0 - 18.0 g/dL Final     Hematocrit   Date Value Ref Range Status   2020 40.2 40.0 - 54.0 %  Final     Platelets   Date Value Ref Range Status   08/12/2020 161 150 - 350 K/uL Final     BUN, Bld   Date Value Ref Range Status   08/12/2020 11 8 - 23 mg/dL Final     Creatinine   Date Value Ref Range Status   08/12/2020 1.0 0.5 - 1.4 mg/dL Final     Glucose   Date Value Ref Range Status   08/12/2020 93 70 - 110 mg/dL Final     Calcium   Date Value Ref Range Status   08/12/2020 9.0 8.7 - 10.5 mg/dL Final     Sodium   Date Value Ref Range Status   08/12/2020 138 136 - 145 mmol/L Final     Potassium   Date Value Ref Range Status   08/12/2020 3.7 3.5 - 5.1 mmol/L Final     Chloride   Date Value Ref Range Status   08/12/2020 102 95 - 110 mmol/L Final     Magnesium   Date Value Ref Range Status   02/09/2020 1.8 1.6 - 2.6 mg/dL Final     AST   Date Value Ref Range Status   08/12/2020 69 (H) 10 - 40 U/L Final     ALT   Date Value Ref Range Status   08/12/2020 76 (H) 10 - 44 U/L Final     Alkaline Phosphatase   Date Value Ref Range Status   08/12/2020 78 55 - 135 U/L Final     Total Bilirubin   Date Value Ref Range Status   08/12/2020 1.6 (H) 0.1 - 1.0 mg/dL Final     Comment:     For infants and newborns, interpretation of results should be based  on gestational age, weight and in agreement with clinical  observations.  Premature Infant recommended reference ranges:  Up to 24 hours.............<8.0 mg/dL  Up to 48 hours............<12.0 mg/dL  3-5 days..................<15.0 mg/dL  6-29 days.................<15.0 mg/dL       Albumin   Date Value Ref Range Status   08/12/2020 4.3 3.5 - 5.2 g/dL Final     INR   Date Value Ref Range Status   08/12/2020 1.0 0.8 - 1.2 Final     Comment:     Coumadin Therapy:  2.0 - 3.0 for INR for all indicators except mechanical heart valves  and antiphospholipid syndromes which should use 2.5 - 3.5.           Assessment/Plan:     1. Low plasma alpha-1 antitrypsin    2. Abnormal iron saturation    3. Abnormal LFTs    4. Fatty liver      Barb HENRIQUEZ Alli is a 64 y.o. male withElevated Hepatic  Enzymes    Low plasma alpha-1 antitrypsin  -need to check phenotype    Abnormal iron saturation  -need to check hemochromatosis gene    Abnormal LFTs-suspect related to alcohol but the pattern is not quite consistent with alcohol need to look for other causes.  Overall there is no evidence of severe fibrosis on FibroScan.  Therefore we could continue to monitor.  Will hold on proceeding with liver biopsy but this main be needed in the future.  -     BILIRUBIN, DIRECT; Future; Expected date: 08/12/2020  -     Basic metabolic panel; Future; Expected date: 08/12/2020  -     Hepatic function panel; Future; Expected date: 08/12/2020  -     Gamma GT; Future; Expected date: 08/12/2020  -     CBC auto differential; Future; Expected date: 08/12/2020    Fatty liver-likely related to alcohol.  Strongly encouraged alcohol abstinence.  -     Basic metabolic panel; Future; Expected date: 08/12/2020  -     Hepatic function panel; Future; Expected date: 08/12/2020  -     Gamma GT; Future; Expected date: 08/12/2020  -     CBC auto differential; Future; Expected date: 08/12/2020    Return to clinic in 6 months with preclinic labs      Nancy Truong MD

## 2020-08-13 ENCOUNTER — PATIENT OUTREACH (OUTPATIENT)
Dept: ADMINISTRATIVE | Facility: HOSPITAL | Age: 65
End: 2020-08-13

## 2020-08-13 NOTE — PROGRESS NOTES
Pt on [facility] report for not seeing PCP in > 12mths. Called pt to offer scheduling annual exam. No answer, unable to leave message.

## 2020-08-17 LAB
A1AT PHENOTYP SERPL-IMP: NORMAL BANDS
A1AT SERPL NEPH-MCNC: 114 MG/DL (ref 100–190)

## 2020-08-20 LAB
GENETICIST REVIEW: NORMAL
HFE GENE MUT ANL BLD/T: NORMAL
HFE RELEASED BY: NORMAL
HFE RESULT SUMMARY: NEGATIVE
REF LAB TEST METHOD: NORMAL
SPECIMEN SOURCE: NORMAL
SPECIMEN,  HEMOCHROMATOSIS: NORMAL

## 2020-08-24 ENCOUNTER — HOSPITAL ENCOUNTER (EMERGENCY)
Facility: HOSPITAL | Age: 65
Discharge: HOME | End: 2020-08-24
Attending: EMERGENCY MEDICINE
Payer: COMMERCIAL

## 2020-08-24 VITALS
RESPIRATION RATE: 18 BRPM | TEMPERATURE: 99 F | BODY MASS INDEX: 25.31 KG/M2 | OXYGEN SATURATION: 98 % | DIASTOLIC BLOOD PRESSURE: 68 MMHG | HEART RATE: 75 BPM | WEIGHT: 171.44 LBS | SYSTOLIC BLOOD PRESSURE: 117 MMHG

## 2020-08-24 DIAGNOSIS — F10.920 ALCOHOLIC INTOXICATION WITHOUT COMPLICATION: ICD-10-CM

## 2020-08-24 DIAGNOSIS — R00.0 TACHYCARDIA: ICD-10-CM

## 2020-08-24 DIAGNOSIS — R11.2 NON-INTRACTABLE VOMITING WITH NAUSEA, UNSPECIFIED VOMITING TYPE: Primary | ICD-10-CM

## 2020-08-24 LAB
ALBUMIN SERPL BCP-MCNC: 5 G/DL (ref 3.5–5.2)
ALP SERPL-CCNC: 88 U/L (ref 55–135)
ALT SERPL W/O P-5'-P-CCNC: 70 U/L (ref 10–44)
AMPHET+METHAMPHET UR QL: NEGATIVE
ANION GAP SERPL CALC-SCNC: 24 MMOL/L (ref 8–16)
AST SERPL-CCNC: 65 U/L (ref 10–40)
BACTERIA #/AREA URNS AUTO: ABNORMAL /HPF
BARBITURATES UR QL SCN>200 NG/ML: NEGATIVE
BASOPHILS # BLD AUTO: 0.05 K/UL (ref 0–0.2)
BASOPHILS NFR BLD: 0.7 % (ref 0–1.9)
BENZODIAZ UR QL SCN>200 NG/ML: NEGATIVE
BILIRUB SERPL-MCNC: 1.4 MG/DL (ref 0.1–1)
BILIRUB UR QL STRIP: ABNORMAL
BUN SERPL-MCNC: 12 MG/DL (ref 8–23)
BZE UR QL SCN: NEGATIVE
CALCIUM SERPL-MCNC: 9.8 MG/DL (ref 8.7–10.5)
CANNABINOIDS UR QL SCN: NEGATIVE
CHLORIDE SERPL-SCNC: 96 MMOL/L (ref 95–110)
CLARITY UR REFRACT.AUTO: ABNORMAL
CO2 SERPL-SCNC: 22 MMOL/L (ref 23–29)
COLOR UR AUTO: YELLOW
CREAT SERPL-MCNC: 1.1 MG/DL (ref 0.5–1.4)
CREAT UR-MCNC: 293.7 MG/DL (ref 23–375)
DIFFERENTIAL METHOD: ABNORMAL
EOSINOPHIL # BLD AUTO: 0.3 K/UL (ref 0–0.5)
EOSINOPHIL NFR BLD: 4.4 % (ref 0–8)
ERYTHROCYTE [DISTWIDTH] IN BLOOD BY AUTOMATED COUNT: 12.8 % (ref 11.5–14.5)
EST. GFR  (AFRICAN AMERICAN): >60 ML/MIN/1.73 M^2
EST. GFR  (NON AFRICAN AMERICAN): >60 ML/MIN/1.73 M^2
ETHANOL SERPL-MCNC: 159 MG/DL
GLUCOSE SERPL-MCNC: 109 MG/DL (ref 70–110)
GLUCOSE UR QL STRIP: NEGATIVE
HCT VFR BLD AUTO: 45.1 % (ref 40–54)
HGB BLD-MCNC: 16.3 G/DL (ref 14–18)
HGB UR QL STRIP: NEGATIVE
HYALINE CASTS UR QL AUTO: 3 /LPF
IMM GRANULOCYTES # BLD AUTO: 0.01 K/UL (ref 0–0.04)
IMM GRANULOCYTES NFR BLD AUTO: 0.1 % (ref 0–0.5)
KETONES UR QL STRIP: ABNORMAL
LEUKOCYTE ESTERASE UR QL STRIP: ABNORMAL
LIPASE SERPL-CCNC: 33 U/L (ref 4–60)
LYMPHOCYTES # BLD AUTO: 1.9 K/UL (ref 1–4.8)
LYMPHOCYTES NFR BLD: 26.4 % (ref 18–48)
MCH RBC QN AUTO: 35.1 PG (ref 27–31)
MCHC RBC AUTO-ENTMCNC: 36.1 G/DL (ref 32–36)
MCV RBC AUTO: 97 FL (ref 82–98)
METHADONE UR QL SCN>300 NG/ML: NEGATIVE
MICROSCOPIC COMMENT: ABNORMAL
MONOCYTES # BLD AUTO: 0.4 K/UL (ref 0.3–1)
MONOCYTES NFR BLD: 5.6 % (ref 4–15)
NEUTROPHILS # BLD AUTO: 4.5 K/UL (ref 1.8–7.7)
NEUTROPHILS NFR BLD: 62.8 % (ref 38–73)
NITRITE UR QL STRIP: NEGATIVE
NRBC BLD-RTO: 0 /100 WBC
OPIATES UR QL SCN: NEGATIVE
PCP UR QL SCN>25 NG/ML: NEGATIVE
PH UR STRIP: 6 [PH] (ref 5–8)
PLATELET # BLD AUTO: 213 K/UL (ref 150–350)
PMV BLD AUTO: 8.4 FL (ref 9.2–12.9)
POTASSIUM SERPL-SCNC: 4.1 MMOL/L (ref 3.5–5.1)
PROT SERPL-MCNC: 8.6 G/DL (ref 6–8.4)
PROT UR QL STRIP: ABNORMAL
RBC # BLD AUTO: 4.64 M/UL (ref 4.6–6.2)
RBC #/AREA URNS AUTO: 2 /HPF (ref 0–4)
SODIUM SERPL-SCNC: 142 MMOL/L (ref 136–145)
SP GR UR STRIP: >=1.03 (ref 1–1.03)
SQUAMOUS #/AREA URNS AUTO: 1 /HPF
TOXICOLOGY INFORMATION: NORMAL
TROPONIN I SERPL DL<=0.01 NG/ML-MCNC: 0.02 NG/ML (ref 0–0.03)
URN SPEC COLLECT METH UR: ABNORMAL
UROBILINOGEN UR STRIP-ACNC: <2 EU/DL
WBC # BLD AUTO: 7.12 K/UL (ref 3.9–12.7)
WBC #/AREA URNS AUTO: 2 /HPF (ref 0–5)

## 2020-08-24 PROCEDURE — 96361 HYDRATE IV INFUSION ADD-ON: CPT | Mod: ER

## 2020-08-24 PROCEDURE — 93010 EKG 12-LEAD: ICD-10-PCS | Mod: ,,, | Performed by: INTERNAL MEDICINE

## 2020-08-24 PROCEDURE — 99285 EMERGENCY DEPT VISIT HI MDM: CPT | Mod: 25,ER

## 2020-08-24 PROCEDURE — 80053 COMPREHEN METABOLIC PANEL: CPT | Mod: ER

## 2020-08-24 PROCEDURE — 83690 ASSAY OF LIPASE: CPT | Mod: ER

## 2020-08-24 PROCEDURE — 84484 ASSAY OF TROPONIN QUANT: CPT | Mod: ER

## 2020-08-24 PROCEDURE — 63600175 PHARM REV CODE 636 W HCPCS: Mod: ER | Performed by: EMERGENCY MEDICINE

## 2020-08-24 PROCEDURE — 80320 DRUG SCREEN QUANTALCOHOLS: CPT | Mod: ER

## 2020-08-24 PROCEDURE — 80307 DRUG TEST PRSMV CHEM ANLYZR: CPT | Mod: ER

## 2020-08-24 PROCEDURE — 93010 ELECTROCARDIOGRAM REPORT: CPT | Mod: ,,, | Performed by: INTERNAL MEDICINE

## 2020-08-24 PROCEDURE — 81000 URINALYSIS NONAUTO W/SCOPE: CPT | Mod: 59,ER

## 2020-08-24 PROCEDURE — 93005 ELECTROCARDIOGRAM TRACING: CPT | Mod: ER

## 2020-08-24 PROCEDURE — 96365 THER/PROPH/DIAG IV INF INIT: CPT | Mod: ER

## 2020-08-24 PROCEDURE — 96375 TX/PRO/DX INJ NEW DRUG ADDON: CPT | Mod: ER

## 2020-08-24 PROCEDURE — 85025 COMPLETE CBC W/AUTO DIFF WBC: CPT | Mod: ER

## 2020-08-24 PROCEDURE — 25000003 PHARM REV CODE 250: Mod: ER | Performed by: EMERGENCY MEDICINE

## 2020-08-24 RX ORDER — ONDANSETRON 2 MG/ML
4 INJECTION INTRAMUSCULAR; INTRAVENOUS
Status: COMPLETED | OUTPATIENT
Start: 2020-08-24 | End: 2020-08-24

## 2020-08-24 RX ORDER — ONDANSETRON 4 MG/1
4 TABLET, ORALLY DISINTEGRATING ORAL EVERY 6 HOURS PRN
Qty: 20 TABLET | Refills: 0 | Status: SHIPPED | OUTPATIENT
Start: 2020-08-24

## 2020-08-24 RX ORDER — KETOROLAC TROMETHAMINE 30 MG/ML
30 INJECTION, SOLUTION INTRAMUSCULAR; INTRAVENOUS
Status: COMPLETED | OUTPATIENT
Start: 2020-08-24 | End: 2020-08-24

## 2020-08-24 RX ADMIN — THIAMINE HYDROCHLORIDE 100 MG: 100 INJECTION, SOLUTION INTRAMUSCULAR; INTRAVENOUS at 11:08

## 2020-08-24 RX ADMIN — LORAZEPAM 1 MG: 2 INJECTION INTRAMUSCULAR; INTRAVENOUS at 12:08

## 2020-08-24 RX ADMIN — SODIUM CHLORIDE 1000 ML: 0.9 INJECTION, SOLUTION INTRAVENOUS at 09:08

## 2020-08-24 RX ADMIN — PROMETHAZINE HYDROCHLORIDE 12.5 MG: 25 INJECTION INTRAMUSCULAR; INTRAVENOUS at 10:08

## 2020-08-24 RX ADMIN — ONDANSETRON 4 MG: 2 INJECTION INTRAMUSCULAR; INTRAVENOUS at 09:08

## 2020-08-24 RX ADMIN — ASCORBIC ACID, VITAMIN A PALMITATE, CHOLECALCIFEROL, THIAMINE HYDROCHLORIDE, RIBOFLAVIN-5 PHOSPHATE SODIUM, PYRIDOXINE HYDROCHLORIDE, NIACINAMIDE, DEXPANTHENOL, ALPHA-TOCOPHEROL ACETATE, VITAMIN K1, FOLIC ACID, BIOTIN, CYANOCOBALAMIN: 200; 3300; 200; 6; 3.6; 6; 40; 15; 10; 150; 600; 60; 5 INJECTION, SOLUTION INTRAVENOUS at 11:08

## 2020-08-24 RX ADMIN — FOLIC ACID 1 MG: 5 INJECTION, SOLUTION INTRAMUSCULAR; INTRAVENOUS; SUBCUTANEOUS at 11:08

## 2020-08-24 RX ADMIN — KETOROLAC TROMETHAMINE 30 MG: 30 INJECTION, SOLUTION INTRAMUSCULAR at 10:08

## 2020-08-24 NOTE — ED PROVIDER NOTES
Encounter Date: 8/24/2020       History     Chief Complaint   Patient presents with    Emesis     nausea/vomiting/shaking. stopped ETOH Friday.      The history is provided by the patient.   Abdominal Pain  The current episode started yesterday (pt states he has these episodes of emesis after he stops drinking etoh. he typically drinks a pint of bourbon a day. last drink was friday, 3 days ago. mild hand tremors. no trauma, no fall, no hx of seizures.). The onset of the illness was gradual. The problem has not changed since onset.The abdominal pain is located in the epigastric region. The abdominal pain does not radiate. Pain scale: mild. The abdominal pain is relieved by nothing. The other symptoms of the illness include nausea and vomiting. The other symptoms of the illness do not include fever, fatigue, jaundice, melena, shortness of breath, diarrhea, dysuria or hematochezia.   Nausea began yesterday. The nausea is associated with alcohol use.   The vomiting began yesterday. Vomiting occurs 2 to 5 times per day. The emesis contains stomach contents. Risk factors: hx of chronic etoh use.   The illness is associated with alcohol use. The patient has not had a change in bowel habit. Symptoms associated with the illness do not include chills, anorexia, diaphoresis, heartburn, constipation, urgency, hematuria, frequency or back pain.     Intermittent emesis, mild tremor, mild sweating, mildly anxious, no tactile disturbances, no auditory disturbances, no visual disturbances, no HA, mild agitation, aao x 3.    Review of patient's allergies indicates:  No Known Allergies  Past Medical History:   Diagnosis Date    Alcohol abuse     Alcohol dependence     Anxiety     Depression     History of suicidal ideation      No past surgical history on file.  No family history on file.  Social History     Tobacco Use    Smoking status: Current Every Day Smoker     Packs/day: 1.00     Types: Cigarettes    Smokeless tobacco:  Never Used   Substance Use Topics    Alcohol use: Yes     Comment: last drink Thursday 9/10 daily use     Drug use: No     Review of Systems   Constitutional: Negative for chills, diaphoresis, fatigue and fever.   HENT: Negative for sore throat.    Respiratory: Negative for shortness of breath.    Cardiovascular: Negative for chest pain.   Gastrointestinal: Positive for abdominal pain, nausea and vomiting. Negative for anorexia, constipation, diarrhea, heartburn, hematochezia, jaundice and melena.   Genitourinary: Negative for dysuria, frequency, hematuria and urgency.   Musculoskeletal: Negative for back pain.   Skin: Negative for rash.   Neurological: Negative for weakness.   Hematological: Does not bruise/bleed easily.   All other systems reviewed and are negative.      Physical Exam     Initial Vitals   BP Pulse Resp Temp SpO2   08/24/20 1004 08/24/20 0941 08/24/20 0941 08/24/20 0941 08/24/20 0941   131/88 102 (!) 22 98.1 °F (36.7 °C) 96 %      MAP       --                Physical Exam    Nursing note and vitals reviewed.  Constitutional: He appears well-developed and well-nourished. He is diaphoretic. No distress.   Smells of etoh   HENT:   Head: Normocephalic and atraumatic.   Eyes: EOM are normal. Pupils are equal, round, and reactive to light. No scleral icterus.   Neck: Normal range of motion. Neck supple. No thyromegaly present.   Cardiovascular: Regular rhythm, normal heart sounds and intact distal pulses. Tachycardia present.  Exam reveals no gallop and no friction rub.    No murmur heard.  Pulmonary/Chest: Breath sounds normal. No respiratory distress. He has no decreased breath sounds. He has no wheezes. He has no rhonchi. He exhibits no tenderness.   Abdominal: Soft. Bowel sounds are normal. He exhibits no distension. There is generalized abdominal tenderness (mild). There is no rigidity, no rebound, no guarding, no CVA tenderness, no tenderness at McBurney's point and negative Cuello's sign. No  hernia.   Musculoskeletal: Normal range of motion. No tenderness or edema.        Right ankle: Normal. He exhibits normal pulse. Achilles tendon normal.        Left ankle: Normal. He exhibits normal pulse. Achilles tendon normal.      Cervical back: Normal.      Thoracic back: Normal.      Lumbar back: Normal.        Right hand: Normal. He exhibits normal capillary refill. Normal sensation noted. Normal strength noted.        Left hand: Normal. He exhibits normal capillary refill. Normal sensation noted. Normal strength noted.   Lymphadenopathy:     He has no cervical adenopathy.   Neurological: He is alert and oriented to person, place, and time. He has normal strength. No cranial nerve deficit or sensory deficit. GCS eye subscore is 4. GCS verbal subscore is 5. GCS motor subscore is 6.   No acute focal neuro deficits. Occasional tremor of hands noted but no overt asterixis on involuntary tremor present on exam. No cogwheel rigidity. Pt states this occurs when anxious.   Skin: Skin is warm and intact. Capillary refill takes less than 2 seconds.   Psychiatric: His behavior is normal. Judgment and thought content normal. His mood appears anxious. Cognition and memory are normal.         ED Course   Procedures  Labs Reviewed   CBC W/ AUTO DIFFERENTIAL - Abnormal; Notable for the following components:       Result Value    Mean Corpuscular Hemoglobin 35.1 (*)     Mean Corpuscular Hemoglobin Conc 36.1 (*)     MPV 8.4 (*)     All other components within normal limits   COMPREHENSIVE METABOLIC PANEL - Abnormal; Notable for the following components:    CO2 22 (*)     Total Protein 8.6 (*)     Total Bilirubin 1.4 (*)     AST 65 (*)     ALT 70 (*)     Anion Gap 24 (*)     All other components within normal limits   URINALYSIS, REFLEX TO URINE CULTURE - Abnormal; Notable for the following components:    Appearance, UA Hazy (*)     Specific Gravity, UA >=1.030 (*)     Protein, UA 2+ (*)     Ketones, UA 1+ (*)     Bilirubin  (UA) 1+ (*)     Leukocytes, UA Trace (*)     All other components within normal limits    Narrative:     Specimen Source->Urine   ALCOHOL,MEDICAL (ETHANOL) - Abnormal; Notable for the following components:    Alcohol, Medical, Serum 159 (*)     All other components within normal limits   URINALYSIS MICROSCOPIC - Abnormal; Notable for the following components:    Hyaline Casts, UA 3 (*)     All other components within normal limits    Narrative:     Specimen Source->Urine   LIPASE   DRUG SCREEN PANEL, URINE EMERGENCY    Narrative:     Specimen Source->Urine   TROPONIN I     EKG Readings: (Independently Interpreted)   Initial Reading: No STEMI. Rhythm: Normal Sinus Rhythm. Heart Rate: 96. Ectopy: No Ectopy. Conduction: Normal. ST Segments: Normal ST Segments. T Waves: Normal. Axis: Normal. Clinical Impression: Normal Sinus Rhythm     ECG Results          EKG 12-lead (Final result)  Result time 08/24/20 20:47:48    Final result by Interface, Lab In [facility] (08/24/20 20:47:48)                 Narrative:    Test Reason : R00.0,    Vent. Rate : 096 BPM     Atrial Rate : 096 BPM     P-R Int : 136 ms          QRS Dur : 090 ms      QT Int : 368 ms       P-R-T Axes : 082 077 059 degrees     QTc Int : 464 ms    Normal sinus rhythm  Normal ECG  When compared with ECG of 09-AUG-2020 12:05,  Aberrant conduction is no longer Present  Confirmed by IDANIA ROGERS, DEEJAY PEREIRA ([id number]) on 8/24/2020 8:47:41 PM    Referred By: [email or web address]   SELF           Confirmed By:DEEJAY EMERSON MD                            Imaging Results          X-Ray Abdomen Flat And Erect (Final result)  Result time 08/24/20 11:16:17    Final result by Alphonse Meza Jr., MD (08/24/20 11:16:17)                 Impression:      No acute findings.  Hepatomegaly.      Electronically signed by: Alphonse Meza Jr., MD  Date:    08/24/2020  Time:    11:16             Narrative:    EXAMINATION:  XR ABDOMEN FLAT AND ERECT    CLINICAL HISTORY:  Abdominal  Pain;    COMPARISON:  None.    FINDINGS:  No abnormally dilated loops of bowel. No gross free air. Normal amount of stool. No abnormal soft tissue calcifications.  The liver is enlarged measuring 19.7 cm in length.  Scoliosis convex toward the right with multilevel degenerative spine change.  No acute osseous findings.                                    Vitals:    08/24/20 0941 08/24/20 1004 08/24/20 1345   BP:  131/88 117/68   Pulse: 102 104 75   Resp: (!) 22 18 18   Temp: 98.1 °F (36.7 °C)  98.8 °F (37.1 °C)   TempSrc: Oral  Oral   SpO2: 96% 97% 98%   Weight: 77.7 kg (171 lb 6.5 oz)         Results for orders placed or performed during the hospital encounter of 08/24/20   CBC W/ AUTO DIFFERENTIAL   Result Value Ref Range    WBC 7.12 3.90 - 12.70 K/uL    RBC 4.64 4.60 - 6.20 M/uL    Hemoglobin 16.3 14.0 - 18.0 g/dL    Hematocrit 45.1 40.0 - 54.0 %    Mean Corpuscular Volume 97 82 - 98 fL    Mean Corpuscular Hemoglobin 35.1 (H) 27.0 - 31.0 pg    Mean Corpuscular Hemoglobin Conc 36.1 (H) 32.0 - 36.0 g/dL    RDW 12.8 11.5 - 14.5 %    Platelets 213 150 - 350 K/uL    MPV 8.4 (L) 9.2 - 12.9 fL    Immature Granulocytes 0.1 0.0 - 0.5 %    Gran # (ANC) 4.5 1.8 - 7.7 K/uL    Immature Grans (Abs) 0.01 0.00 - 0.04 K/uL    Lymph # 1.9 1.0 - 4.8 K/uL    Mono # 0.4 0.3 - 1.0 K/uL    Eos # 0.3 0.0 - 0.5 K/uL    Baso # 0.05 0.00 - 0.20 K/uL    nRBC 0 0 /100 WBC    Gran% 62.8 38.0 - 73.0 %    Lymph% 26.4 18.0 - 48.0 %    Mono% 5.6 4.0 - 15.0 %    Eosinophil% 4.4 0.0 - 8.0 %    Basophil% 0.7 0.0 - 1.9 %    Differential Method Automated    Comp. Metabolic Panel   Result Value Ref Range    Sodium 142 136 - 145 mmol/L    Potassium 4.1 3.5 - 5.1 mmol/L    Chloride 96 95 - 110 mmol/L    CO2 22 (L) 23 - 29 mmol/L    Glucose 109 70 - 110 mg/dL    BUN, Bld 12 8 - 23 mg/dL    Creatinine 1.1 0.5 - 1.4 mg/dL    Calcium 9.8 8.7 - 10.5 mg/dL    Total Protein 8.6 (H) 6.0 - 8.4 g/dL    Albumin 5.0 3.5 - 5.2 g/dL    Total Bilirubin 1.4 (H) 0.1 - 1.0  mg/dL    Alkaline Phosphatase 88 55 - 135 U/L    AST 65 (H) 10 - 40 U/L    ALT 70 (H) 10 - 44 U/L    Anion Gap 24 (H) 8 - 16 mmol/L    eGFR if African American >60.0 >60 mL/min/1.73 m^2    eGFR if non African American >60.0 >60 mL/min/1.73 m^2   Lipase   Result Value Ref Range    Lipase 33 4 - 60 U/L   Urinalysis, Reflex to Urine Culture Urine, Clean Catch    Specimen: Urine   Result Value Ref Range    Specimen UA Urine, Clean Catch     Color, UA Yellow Yellow, Straw, Daily    Appearance, UA Hazy (A) Clear    pH, UA 6.0 5.0 - 8.0    Specific Gravity, UA >=1.030 (A) 1.005 - 1.030    Protein, UA 2+ (A) Negative    Glucose, UA Negative Negative    Ketones, UA 1+ (A) Negative    Bilirubin (UA) 1+ (A) Negative    Occult Blood UA Negative Negative    Nitrite, UA Negative Negative    Urobilinogen, UA <2.0 <2.0 EU/dL    Leukocytes, UA Trace (A) Negative   Ethanol   Result Value Ref Range    Alcohol, Medical, Serum 159 (H) <10 mg/dL   Drug screen panel, emergency   Result Value Ref Range    Benzodiazepines Negative     Methadone metabolites Negative     Cocaine (Metab.) Negative     Opiate Scrn, Ur Negative     Barbiturate Screen, Ur Negative     Amphetamine Screen, Ur Negative     THC Negative     Phencyclidine Negative     Creatinine, Random Ur 293.7 23.0 - 375.0 mg/dL    Toxicology Information SEE COMMENT    Troponin I   Result Value Ref Range    Troponin I 0.019 0.000 - 0.026 ng/mL   Urinalysis Microscopic   Result Value Ref Range    RBC, UA 2 0 - 4 /hpf    WBC, UA 2 0 - 5 /hpf    Bacteria Rare None-Occ /hpf    Squam Epithel, UA 1 /hpf    Hyaline Casts, UA 3 (A) 0-1/lpf /lpf    Microscopic Comment SEE COMMENT          Imaging Results          X-Ray Abdomen Flat And Erect (Final result)  Result time 08/24/20 11:16:17    Final result by Alphonse Meza Jr., MD (08/24/20 11:16:17)                 Impression:      No acute findings.  Hepatomegaly.      Electronically signed by: Alphonse Meza Jr.  MD  Date:    08/24/2020  Time:    11:16             Narrative:    EXAMINATION:  XR ABDOMEN FLAT AND ERECT    CLINICAL HISTORY:  Abdominal Pain;    COMPARISON:  None.    FINDINGS:  No abnormally dilated loops of bowel. No gross free air. Normal amount of stool. No abnormal soft tissue calcifications.  The liver is enlarged measuring 19.7 cm in length.  Scoliosis convex toward the right with multilevel degenerative spine change.  No acute osseous findings.                                Medications   sodium chloride 0.9% bolus 1,000 mL (0 mLs Intravenous Stopped 8/24/20 1154)   ondansetron injection 4 mg (4 mg Intravenous Given 8/24/20 0958)   ketorolac injection 30 mg (30 mg Intravenous Given 8/24/20 1000)   promethazine (PHENERGAN) 12.5 mg in dextrose 5 % 50 mL IVPB (0 mg Intravenous Stopped 8/24/20 1025)   sodium chloride 0.9% 1,000 mL with mvi, adult no.4 with vit K 3,300 unit- 150 mcg/10 mL 10 mL infusion ( Intravenous Stopped 8/24/20 1400)     And   folic acid 1 mg in sodium chloride 0.9% 100 mL IVPB (0 mg Intravenous Stopped 8/24/20 1155)     And   thiamine (B-1) 100 mg in dextrose 5 % 50 mL IVPB (0 mg Intravenous Stopped 8/24/20 1155)   lorazepam (ATIVAN) injection 1 mg (1 mg Intravenous Given 8/24/20 1249)     10:08 AM based on the  Clinical institute withdrawal assessment for alcohol (CIWA), pt scored 11, which is mild etoh withdrawal where medication is optional, per guidelines.  Will continue to monitor closely.    12:46 PM  - Re-evaluation:  The patient is resting comfortably and is in no acute distress.  Emesis and tachycardia improved while here in ER. Discussed test results and notified of pending labs. Answered questions at this time.     1:49 PM - Re-evaluation: The patient is resting comfortably and is in no acute distress. He states that his symptoms have improved after treatment within ER.  Withdrawal symptoms have improved. Discussed test results, shared treatment plan, specific conditions for  return, and importance of follow up with patient and family.  Driving precautions discussed. Advised close f/u c pcp for reevaluation of etoh use/intoxication and to return if symptoms persist or worsen. Emesis has improved and pt able to tolerate PO.  He understands and agrees with the plan as discussed. Answered  his questions at this time. He has remained hemodynamically stable throughout the ED course and is appropriate for discharge home.     Regarding VOMITING, I advised patient on importance of staying well hydrated; eating frequent, small amounts of clear liquids; avoid solid foods until there has been no vomiting for six hours, and then work slowly back to a normal diet; and to use an OTC bismuth stomach remedy for upset stomach, nausea, indigestion, and diarrhea. I discussed signs and symptoms of dehydration and possible causes of nausea and vomiting including viral infections, medications, migraine headaches, food poisoning, allergies, and peptic ulcer disease. Instructed patient to take medications as prescribed and to follow up with primary care provider or return to ER if condition worsens.     Driving or other activities under influence of medications - Patient and/or family/caretaker was given a prescription for, or instructed to use a medicine that may impair ability to drive, operate machinery, or participate in other potentially dangerous activities.  Patient was instructed not to participate in these activities while under the influence of these medications.    Pre-hypertension/Hypertension: The pt has been informed that they may have pre-hypertension or hypertension based on a blood pressure reading in the ED. I recommend that the pt call the PCP listed on their discharge instructions or a physician of their choice this week to arrange f/u for further evaluation of possible pre-hypertension or hypertension.     Barb Carson was given a handout which discussed their disease process,  precautions, and instructions for follow-up and therapy.    Follow-up Information     Tnoy Paul DO. Schedule an appointment as soon as possible for a visit in 1 week.    Specialty: Family Medicine  Contact information:  [address] [facility] [address]  [place] [address] [phone]             [facility].    Specialty: Emergency Medicine  Why: As needed, If symptoms worsen  Contact information:  [address] [facility] [address]  [facility] [address] [phone]                    Medication List      CONTINUE taking these medications    ondansetron 4 MG Tbdl  Commonly known as: ZOFRAN-ODT  Take 1 tablet (4 mg total) by mouth every 6 (six) hours as needed (NAUSEA/VOMITING).        ASK your doctor about these medications    buPROPion 300 MG 24 hr tablet  Commonly known as: WELLBUTRIN XL  Take 1 tablet (300 mg total) by mouth once daily.     cetirizine 10 MG tablet  Commonly known as: ZYRTEC  Take 1 tablet (10 mg total) by mouth once daily.     chlordiazepoxide 10 MG capsule  Commonly known as: LIBRIUM  Take 1 capsule (10 mg total) by mouth 3 (three) times daily for 3 days, THEN 1 capsule (10 mg total) 2 (two) times daily for 3 days, THEN 1 capsule (10 mg total) once daily for 3 days.  Start taking on: June 27, 2020     escitalopram oxalate 10 MG tablet  Commonly known as: LEXAPRO     fluticasone propionate 50 mcg/actuation nasal spray  Commonly known as: FLONASE  2 sprays (100 mcg total) by Each Nostril route once daily.     folic acid 1 MG tablet  Commonly known as: FOLVITE  TAKE ONE TABLET BY MOUTH AS NEEDED AS DIRECTED     ibuprofen 200 MG tablet  Commonly known as: ADVIL,MOTRIN     LORazepam 1 MG tablet  Commonly known as: ATIVAN  Take 1 tablet (1 mg total) by mouth 2 (two) times daily as needed (Anxiety/Tremors).     multivitamin capsule  Take 1 capsule by mouth once daily.     naltrexone 50 mg tablet  Commonly known as: DEPADE     promethazine 25 MG tablet  Commonly known as: PHENERGAN  Take 1 tablet (25  mg total) by mouth every 6 (six) hours as needed for Nausea.           Where to Get Your Medications      You can get these medications from any pharmacy    Bring a paper prescription for each of these medications  · ondansetron 4 MG Tbdl        Current Discharge Medication List            ED Diagnosis  1. Non-intractable vomiting with nausea, unspecified vomiting type    2. Tachycardia    3. Alcoholic intoxication without complication    ]                                    Clinical Impression:       ICD-10-CM ICD-9-CM   1. Non-intractable vomiting with nausea, unspecified vomiting type  R11.2 787.01   2. Tachycardia  R00.0 785.0   3. Alcoholic intoxication without complication  F10.920 305.00         Disposition:   Disposition: Discharged  Condition: Stable     ED Disposition Condition    Discharge Stable        ED Prescriptions     Medication Sig Dispense Start Date End Date Auth. Provider    ondansetron (ZOFRAN-ODT) 4 MG TbDL Take 1 tablet (4 mg total) by mouth every 6 (six) hours as needed (NAUSEA/VOMITING). 20 tablet 8/24/2020  Bull Flor Jr., MD        Follow-up Information     Follow up With Specialties Details Why Contact Info    Tony Paul DO Family Medicine Schedule an appointment as soon as possible for a visit in 1 week  [address] [place] [id number]  [phone]      [facility]  As needed, If symptoms worsen [address] [place] [id number] [phone]                                     Bull Flor Jr., MD  08/24/20 1404       Bull Flor Jr., MD  09/14/20 0650

## 2020-08-30 ENCOUNTER — HOSPITAL ENCOUNTER (EMERGENCY)
Facility: HOSPITAL | Age: 65
Discharge: HOME | End: 2020-08-30
Attending: EMERGENCY MEDICINE
Payer: COMMERCIAL

## 2020-08-30 VITALS
TEMPERATURE: 99 F | OXYGEN SATURATION: 95 % | DIASTOLIC BLOOD PRESSURE: 70 MMHG | HEART RATE: 81 BPM | RESPIRATION RATE: 19 BRPM | WEIGHT: 180 LBS | HEIGHT: 69 IN | SYSTOLIC BLOOD PRESSURE: 120 MMHG | BODY MASS INDEX: 26.66 KG/M2

## 2020-08-30 VITALS
DIASTOLIC BLOOD PRESSURE: 65 MMHG | WEIGHT: 180 LBS | TEMPERATURE: 98 F | OXYGEN SATURATION: 96 % | BODY MASS INDEX: 26.66 KG/M2 | RESPIRATION RATE: 18 BRPM | SYSTOLIC BLOOD PRESSURE: 115 MMHG | HEART RATE: 96 BPM | HEIGHT: 69 IN

## 2020-08-30 DIAGNOSIS — R10.13 ABDOMINAL PAIN, EPIGASTRIC: ICD-10-CM

## 2020-08-30 DIAGNOSIS — F10.10 ALCOHOL ABUSE: Primary | ICD-10-CM

## 2020-08-30 DIAGNOSIS — F10.920 ALCOHOLIC INTOXICATION WITHOUT COMPLICATION: Primary | ICD-10-CM

## 2020-08-30 DIAGNOSIS — R11.2 NON-INTRACTABLE VOMITING WITH NAUSEA, UNSPECIFIED VOMITING TYPE: ICD-10-CM

## 2020-08-30 LAB
ALBUMIN SERPL BCP-MCNC: 4.2 G/DL (ref 3.5–5.2)
ALBUMIN SERPL BCP-MCNC: 4.3 G/DL (ref 3.5–5.2)
ALP SERPL-CCNC: 82 U/L (ref 55–135)
ALP SERPL-CCNC: 84 U/L (ref 55–135)
ALT SERPL W/O P-5'-P-CCNC: 100 U/L (ref 10–44)
ALT SERPL W/O P-5'-P-CCNC: 101 U/L (ref 10–44)
ANION GAP SERPL CALC-SCNC: 17 MMOL/L (ref 8–16)
ANION GAP SERPL CALC-SCNC: 19 MMOL/L (ref 8–16)
AST SERPL-CCNC: 78 U/L (ref 10–40)
AST SERPL-CCNC: 80 U/L (ref 10–40)
BASOPHILS # BLD AUTO: 0.03 K/UL (ref 0–0.2)
BASOPHILS # BLD AUTO: 0.04 K/UL (ref 0–0.2)
BASOPHILS NFR BLD: 0.6 % (ref 0–1.9)
BASOPHILS NFR BLD: 0.7 % (ref 0–1.9)
BILIRUB SERPL-MCNC: 0.9 MG/DL (ref 0.1–1)
BILIRUB SERPL-MCNC: 0.9 MG/DL (ref 0.1–1)
BUN SERPL-MCNC: 11 MG/DL (ref 8–23)
BUN SERPL-MCNC: 12 MG/DL (ref 8–23)
CALCIUM SERPL-MCNC: 8.5 MG/DL (ref 8.7–10.5)
CALCIUM SERPL-MCNC: 8.8 MG/DL (ref 8.7–10.5)
CHLORIDE SERPL-SCNC: 102 MMOL/L (ref 95–110)
CHLORIDE SERPL-SCNC: 99 MMOL/L (ref 95–110)
CO2 SERPL-SCNC: 21 MMOL/L (ref 23–29)
CO2 SERPL-SCNC: 23 MMOL/L (ref 23–29)
CREAT SERPL-MCNC: 0.8 MG/DL (ref 0.5–1.4)
CREAT SERPL-MCNC: 0.8 MG/DL (ref 0.5–1.4)
DIFFERENTIAL METHOD: ABNORMAL
DIFFERENTIAL METHOD: ABNORMAL
EOSINOPHIL # BLD AUTO: 0 K/UL (ref 0–0.5)
EOSINOPHIL # BLD AUTO: 0.1 K/UL (ref 0–0.5)
EOSINOPHIL NFR BLD: 0.2 % (ref 0–8)
EOSINOPHIL NFR BLD: 1.5 % (ref 0–8)
ERYTHROCYTE [DISTWIDTH] IN BLOOD BY AUTOMATED COUNT: 12.9 % (ref 11.5–14.5)
ERYTHROCYTE [DISTWIDTH] IN BLOOD BY AUTOMATED COUNT: 13 % (ref 11.5–14.5)
EST. GFR  (AFRICAN AMERICAN): >60 ML/MIN/1.73 M^2
EST. GFR  (AFRICAN AMERICAN): >60 ML/MIN/1.73 M^2
EST. GFR  (NON AFRICAN AMERICAN): >60 ML/MIN/1.73 M^2
EST. GFR  (NON AFRICAN AMERICAN): >60 ML/MIN/1.73 M^2
ETHANOL SERPL-MCNC: 146 MG/DL
ETHANOL SERPL-MCNC: 257 MG/DL
GLUCOSE SERPL-MCNC: 103 MG/DL (ref 70–110)
GLUCOSE SERPL-MCNC: 129 MG/DL (ref 70–110)
HCT VFR BLD AUTO: 39.2 % (ref 40–54)
HCT VFR BLD AUTO: 39.8 % (ref 40–54)
HGB BLD-MCNC: 14.4 G/DL (ref 14–18)
HGB BLD-MCNC: 14.4 G/DL (ref 14–18)
IMM GRANULOCYTES # BLD AUTO: 0.01 K/UL (ref 0–0.04)
IMM GRANULOCYTES # BLD AUTO: 0.02 K/UL (ref 0–0.04)
IMM GRANULOCYTES NFR BLD AUTO: 0.2 % (ref 0–0.5)
IMM GRANULOCYTES NFR BLD AUTO: 0.3 % (ref 0–0.5)
LIPASE SERPL-CCNC: 41 U/L (ref 4–60)
LIPASE SERPL-CCNC: 45 U/L (ref 4–60)
LYMPHOCYTES # BLD AUTO: 1.5 K/UL (ref 1–4.8)
LYMPHOCYTES # BLD AUTO: 1.7 K/UL (ref 1–4.8)
LYMPHOCYTES NFR BLD: 29 % (ref 18–48)
LYMPHOCYTES NFR BLD: 31.3 % (ref 18–48)
MCH RBC QN AUTO: 35.1 PG (ref 27–31)
MCH RBC QN AUTO: 35.6 PG (ref 27–31)
MCHC RBC AUTO-ENTMCNC: 36.2 G/DL (ref 32–36)
MCHC RBC AUTO-ENTMCNC: 36.7 G/DL (ref 32–36)
MCV RBC AUTO: 97 FL (ref 82–98)
MCV RBC AUTO: 97 FL (ref 82–98)
MONOCYTES # BLD AUTO: 0.3 K/UL (ref 0.3–1)
MONOCYTES # BLD AUTO: 0.4 K/UL (ref 0.3–1)
MONOCYTES NFR BLD: 6.1 % (ref 4–15)
MONOCYTES NFR BLD: 6.3 % (ref 4–15)
NEUTROPHILS # BLD AUTO: 2.9 K/UL (ref 1.8–7.7)
NEUTROPHILS # BLD AUTO: 3.7 K/UL (ref 1.8–7.7)
NEUTROPHILS NFR BLD: 60.1 % (ref 38–73)
NEUTROPHILS NFR BLD: 63.7 % (ref 38–73)
NRBC BLD-RTO: 0 /100 WBC
NRBC BLD-RTO: 0 /100 WBC
PLATELET # BLD AUTO: 141 K/UL (ref 150–350)
PLATELET # BLD AUTO: 156 K/UL (ref 150–350)
PMV BLD AUTO: 8.7 FL (ref 9.2–12.9)
PMV BLD AUTO: 8.7 FL (ref 9.2–12.9)
POTASSIUM SERPL-SCNC: 3.5 MMOL/L (ref 3.5–5.1)
POTASSIUM SERPL-SCNC: 3.7 MMOL/L (ref 3.5–5.1)
PROT SERPL-MCNC: 7 G/DL (ref 6–8.4)
PROT SERPL-MCNC: 7.6 G/DL (ref 6–8.4)
RBC # BLD AUTO: 4.05 M/UL (ref 4.6–6.2)
RBC # BLD AUTO: 4.1 M/UL (ref 4.6–6.2)
SODIUM SERPL-SCNC: 139 MMOL/L (ref 136–145)
SODIUM SERPL-SCNC: 142 MMOL/L (ref 136–145)
TROPONIN I SERPL DL<=0.01 NG/ML-MCNC: 0.02 NG/ML (ref 0–0.03)
WBC # BLD AUTO: 4.8 K/UL (ref 3.9–12.7)
WBC # BLD AUTO: 5.87 K/UL (ref 3.9–12.7)

## 2020-08-30 PROCEDURE — 80320 DRUG SCREEN QUANTALCOHOLS: CPT | Mod: ER

## 2020-08-30 PROCEDURE — 63600175 PHARM REV CODE 636 W HCPCS: Mod: ER | Performed by: EMERGENCY MEDICINE

## 2020-08-30 PROCEDURE — 83690 ASSAY OF LIPASE: CPT | Mod: 91,ER

## 2020-08-30 PROCEDURE — 80053 COMPREHEN METABOLIC PANEL: CPT | Mod: ER

## 2020-08-30 PROCEDURE — 96374 THER/PROPH/DIAG INJ IV PUSH: CPT | Mod: ER

## 2020-08-30 PROCEDURE — 83690 ASSAY OF LIPASE: CPT | Mod: ER

## 2020-08-30 PROCEDURE — 84484 ASSAY OF TROPONIN QUANT: CPT | Mod: ER

## 2020-08-30 PROCEDURE — 80320 DRUG SCREEN QUANTALCOHOLS: CPT | Mod: 91,ER

## 2020-08-30 PROCEDURE — 85025 COMPLETE CBC W/AUTO DIFF WBC: CPT | Mod: ER

## 2020-08-30 PROCEDURE — 96375 TX/PRO/DX INJ NEW DRUG ADDON: CPT | Mod: ER

## 2020-08-30 PROCEDURE — 96372 THER/PROPH/DIAG INJ SC/IM: CPT | Mod: ER,59

## 2020-08-30 PROCEDURE — 96361 HYDRATE IV INFUSION ADD-ON: CPT | Mod: ER

## 2020-08-30 PROCEDURE — 99284 EMERGENCY DEPT VISIT MOD MDM: CPT | Mod: 25,27,ER

## 2020-08-30 PROCEDURE — 93005 ELECTROCARDIOGRAM TRACING: CPT | Mod: ER

## 2020-08-30 PROCEDURE — 25000003 PHARM REV CODE 250: Mod: ER | Performed by: EMERGENCY MEDICINE

## 2020-08-30 PROCEDURE — 80053 COMPREHEN METABOLIC PANEL: CPT | Mod: 91,ER

## 2020-08-30 PROCEDURE — 93010 EKG 12-LEAD: ICD-10-PCS | Mod: ,,, | Performed by: INTERNAL MEDICINE

## 2020-08-30 PROCEDURE — 99285 EMERGENCY DEPT VISIT HI MDM: CPT | Mod: 25,ER

## 2020-08-30 PROCEDURE — 93010 ELECTROCARDIOGRAM REPORT: CPT | Mod: ,,, | Performed by: INTERNAL MEDICINE

## 2020-08-30 PROCEDURE — 85025 COMPLETE CBC W/AUTO DIFF WBC: CPT | Mod: 91,ER

## 2020-08-30 RX ORDER — LIDOCAINE HYDROCHLORIDE 20 MG/ML
5 SOLUTION OROPHARYNGEAL
Status: COMPLETED | OUTPATIENT
Start: 2020-08-30 | End: 2020-08-30

## 2020-08-30 RX ORDER — ONDANSETRON 2 MG/ML
4 INJECTION INTRAMUSCULAR; INTRAVENOUS
Status: COMPLETED | OUTPATIENT
Start: 2020-08-30 | End: 2020-08-30

## 2020-08-30 RX ORDER — ONDANSETRON 4 MG/1
4 TABLET, FILM COATED ORAL EVERY 8 HOURS PRN
Qty: 12 TABLET | Refills: 0 | Status: SHIPPED | OUTPATIENT
Start: 2020-08-30

## 2020-08-30 RX ORDER — LORAZEPAM 1 MG/1
1 TABLET ORAL EVERY 8 HOURS PRN
Qty: 12 TABLET | Refills: 0 | Status: SHIPPED | OUTPATIENT
Start: 2020-08-30 | End: 2020-09-03

## 2020-08-30 RX ORDER — MAG HYDROX/ALUMINUM HYD/SIMETH 200-200-20
5 SUSPENSION, ORAL (FINAL DOSE FORM) ORAL
Status: COMPLETED | OUTPATIENT
Start: 2020-08-30 | End: 2020-08-30

## 2020-08-30 RX ORDER — THIAMINE HYDROCHLORIDE 100 MG/ML
100 INJECTION, SOLUTION INTRAMUSCULAR; INTRAVENOUS
Status: COMPLETED | OUTPATIENT
Start: 2020-08-30 | End: 2020-08-30

## 2020-08-30 RX ADMIN — ONDANSETRON 4 MG: 2 INJECTION INTRAMUSCULAR; INTRAVENOUS at 04:08

## 2020-08-30 RX ADMIN — ALUMINUM HYDROXIDE, MAGNESIUM HYDROXIDE, AND SIMETHICONE 5 ML: 200; 200; 20 SUSPENSION ORAL at 12:08

## 2020-08-30 RX ADMIN — THIAMINE HYDROCHLORIDE 100 MG: 100 INJECTION, SOLUTION INTRAMUSCULAR; INTRAVENOUS at 12:08

## 2020-08-30 RX ADMIN — SODIUM CHLORIDE 1000 ML: 0.9 INJECTION, SOLUTION INTRAVENOUS at 12:08

## 2020-08-30 RX ADMIN — ONDANSETRON 4 MG: 2 INJECTION INTRAMUSCULAR; INTRAVENOUS at 12:08

## 2020-08-30 RX ADMIN — LORAZEPAM 1 MG: 2 INJECTION INTRAMUSCULAR; INTRAVENOUS at 05:08

## 2020-08-30 RX ADMIN — LIDOCAINE HYDROCHLORIDE 5 ML: 20 SOLUTION ORAL; TOPICAL at 12:08

## 2020-08-30 RX ADMIN — SODIUM CHLORIDE 1000 ML: 0.9 INJECTION, SOLUTION INTRAVENOUS at 04:08

## 2020-08-30 NOTE — ED PROVIDER NOTES
Encounter Date: 8/30/2020       History     Chief Complaint   Patient presents with    Alcohol Intoxication     Last drink on Thursday     The history is provided by the patient.   Abdominal Pain  The current episode started yesterday. The onset of the illness was gradual. The problem has not changed since onset.The abdominal pain is located in the epigastric region. The abdominal pain does not radiate. The severity of the abdominal pain is 8/10. The abdominal pain is relieved by nothing. The abdominal pain is exacerbated by alcohol use. The other symptoms of the illness include nausea. The other symptoms of the illness do not include fever, fatigue, jaundice, melena, vomiting, diarrhea, dysuria, hematemesis or hematochezia.     Review of patient's allergies indicates:  No Known Allergies  Past Medical History:   Diagnosis Date    Alcohol abuse     Alcohol dependence     Anxiety     Depression     History of suicidal ideation      No past surgical history on file.  No family history on file.  Social History     Tobacco Use    Smoking status: Current Every Day Smoker     Packs/day: 1.00     Types: Cigarettes    Smokeless tobacco: Never Used   Substance Use Topics    Alcohol use: Yes     Comment: last drink was wednesday    Drug use: No     Review of Systems   Constitutional: Negative for fatigue and fever.   Gastrointestinal: Positive for abdominal pain and nausea. Negative for diarrhea, hematemesis, hematochezia, jaundice, melena and vomiting.   Genitourinary: Negative for dysuria.   All other systems reviewed and are negative.      Physical Exam     Initial Vitals   BP Pulse Resp Temp SpO2   08/30/20 1201 08/30/20 1200 08/30/20 1200 08/30/20 1200 08/30/20 1200   124/71 101 (!) 120 97.5 °F (36.4 °C) 96 %      MAP       --                Physical Exam    Nursing note and vitals reviewed.  Constitutional: He appears well-developed and well-nourished.   HENT:   Head: Normocephalic and atraumatic.    Mouth/Throat: Oropharynx is clear and moist.   Eyes: Conjunctivae and EOM are normal. Pupils are equal, round, and reactive to light.   Neck: Normal range of motion. Neck supple.   Cardiovascular: Normal rate, regular rhythm and normal heart sounds.   Pulmonary/Chest: Breath sounds normal.   Abdominal: Soft. Bowel sounds are normal.   Musculoskeletal: Normal range of motion.   Neurological: He is alert and oriented to person, place, and time. He has normal strength.   Skin: Skin is warm and dry.   Psychiatric: He has a normal mood and affect. Thought content normal.         ED Course   Procedures  Labs Reviewed   CBC W/ AUTO DIFFERENTIAL - Abnormal; Notable for the following components:       Result Value    RBC 4.10 (*)     Hematocrit 39.8 (*)     Mean Corpuscular Hemoglobin 35.1 (*)     Mean Corpuscular Hemoglobin Conc 36.2 (*)     Platelets 141 (*)     MPV 8.7 (*)     All other components within normal limits   COMPREHENSIVE METABOLIC PANEL - Abnormal; Notable for the following components:    Calcium 8.5 (*)     AST 80 (*)      (*)     Anion Gap 17 (*)     All other components within normal limits   ALCOHOL,MEDICAL (ETHANOL) - Abnormal; Notable for the following components:    Alcohol, Medical, Serum 257 (*)     All other components within normal limits   LIPASE   TROPONIN I   URINALYSIS, REFLEX TO URINE CULTURE     Results for orders placed or performed during the hospital encounter of 08/30/20   CBC W/ AUTO DIFFERENTIAL   Result Value Ref Range    WBC 5.87 3.90 - 12.70 K/uL    RBC 4.10 (L) 4.60 - 6.20 M/uL    Hemoglobin 14.4 14.0 - 18.0 g/dL    Hematocrit 39.8 (L) 40.0 - 54.0 %    Mean Corpuscular Volume 97 82 - 98 fL    Mean Corpuscular Hemoglobin 35.1 (H) 27.0 - 31.0 pg    Mean Corpuscular Hemoglobin Conc 36.2 (H) 32.0 - 36.0 g/dL    RDW 13.0 11.5 - 14.5 %    Platelets 141 (L) 150 - 350 K/uL    MPV 8.7 (L) 9.2 - 12.9 fL    Immature Granulocytes 0.3 0.0 - 0.5 %    Gran # (ANC) 3.7 1.8 - 7.7 K/uL     Immature Grans (Abs) 0.02 0.00 - 0.04 K/uL    Lymph # 1.7 1.0 - 4.8 K/uL    Mono # 0.4 0.3 - 1.0 K/uL    Eos # 0.0 0.0 - 0.5 K/uL    Baso # 0.04 0.00 - 0.20 K/uL    nRBC 0 0 /100 WBC    Gran% 63.7 38.0 - 73.0 %    Lymph% 29.0 18.0 - 48.0 %    Mono% 6.1 4.0 - 15.0 %    Eosinophil% 0.2 0.0 - 8.0 %    Basophil% 0.7 0.0 - 1.9 %    Differential Method Automated    Comp. Metabolic Panel   Result Value Ref Range    Sodium 142 136 - 145 mmol/L    Potassium 3.5 3.5 - 5.1 mmol/L    Chloride 102 95 - 110 mmol/L    CO2 23 23 - 29 mmol/L    Glucose 103 70 - 110 mg/dL    BUN, Bld 11 8 - 23 mg/dL    Creatinine 0.8 0.5 - 1.4 mg/dL    Calcium 8.5 (L) 8.7 - 10.5 mg/dL    Total Protein 7.0 6.0 - 8.4 g/dL    Albumin 4.2 3.5 - 5.2 g/dL    Total Bilirubin 0.9 0.1 - 1.0 mg/dL    Alkaline Phosphatase 84 55 - 135 U/L    AST 80 (H) 10 - 40 U/L     (H) 10 - 44 U/L    Anion Gap 17 (H) 8 - 16 mmol/L    eGFR if African American >60.0 >60 mL/min/1.73 m^2    eGFR if non African American >60.0 >60 mL/min/1.73 m^2   Lipase   Result Value Ref Range    Lipase 45 4 - 60 U/L   Troponin I   Result Value Ref Range    Troponin I 0.019 0.000 - 0.026 ng/mL   Ethanol   Result Value Ref Range    Alcohol, Medical, Serum 257 (H) <10 mg/dL       EKG Readings: (Independently Interpreted)   Initial Reading: No STEMI. Rhythm: Normal Sinus Rhythm. Heart Rate: 80. Ectopy: No Ectopy. Conduction: RBBB. ST Segments: Normal ST Segments. T Waves: Normal. Axis: Normal. Clinical Impression: Normal Sinus Rhythm       Imaging Results          X-Ray Abdomen Flat And Erect (Final result)  Result time 08/30/20 13:11:33    ED Interpretation by Nasir Lauren MD (08/30/20 13:11:33, [facility] Emergency Medicine)    No free air                  Final result by Ancelmo Page MD (08/30/20 13:09:20)                 Impression:      See above.      Electronically signed by: Ancelmo Page MD  Date:    08/30/2020  Time:    13:09             Narrative:     EXAMINATION:  XR ABDOMEN FLAT AND ERECT    CLINICAL HISTORY:  Abdominal Pain;    FINDINGS:  Comparison study 08/24/2020.  Lung bases are clear.  There is no intraperitoneal free air or air-fluid level on upright imaging.  Nonobstructive bowel gas pattern with paucity of no unusual calcification.  Multilevel degenerative thoracolumbar spine with mild sigmoidal scoliosis.                            1:15 PM - Counseling: Spoke with the patient and discussed todays findings, in addition to providing specific details for the plan of care and counseling regarding the diagnosis and prognosis. Questions are answered at this time. Pt will be discharged under care of family. Cessation of alcohol discussed.                                       Clinical Impression:       ICD-10-CM ICD-9-CM   1. Alcoholic intoxication without complication  F10.920 305.00   2. Abdominal pain, epigastric  R10.13 789.06         Disposition:   Disposition: Discharged  Condition: Stable     ED Disposition Condition    Discharge Stable        ED Prescriptions     None        Follow-up Information     Follow up With Specialties Details Why Contact Info    Tony Paul DO Family Medicine Call in 1 day  [address] [place] [id number]  [phone]      [facility]  If symptoms worsen [address] [place] [id number] [phone]                                     Nasir Lauren MD  08/30/20 8627
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
## 2020-08-30 NOTE — ED NOTES
Spoke with pts brother for ride per pt request. Uziel brother of pt  states will be by shortly to . Pt informed.

## 2020-08-30 NOTE — ED NOTES
"Pt asleep in bed. No distress noted. Respirations even and unlabored. Pt states "See I told you Ativan would work" Denies pain.   "

## 2020-08-30 NOTE — ED PROVIDER NOTES
Encounter Date: 8/30/2020       History     Chief Complaint   Patient presents with    Alcohol Intoxication     Last drink Thursday; nausea and vomiting      HPI   Pt seen earlier for same complaint. Pt reports last drink Thursday, but had an elevated ETOH level. Pt requesting Ativan o assist in detox. Pt reports N/V but denies epigastric pain.    Review of patient's allergies indicates:  No Known Allergies  Past Medical History:   Diagnosis Date    Alcohol abuse     Alcohol dependence     Anxiety     Depression     History of suicidal ideation      No past surgical history on file.  No family history on file.  Social History     Tobacco Use    Smoking status: Current Every Day Smoker     Packs/day: 1.00     Types: Cigarettes    Smokeless tobacco: Never Used   Substance Use Topics    Alcohol use: Yes     Comment: last drink was wednesday    Drug use: No     Review of Systems   Gastrointestinal: Positive for nausea and vomiting.   All other systems reviewed and are negative.      Physical Exam     Initial Vitals [08/30/20 1553]   BP Pulse Resp Temp SpO2   99/67 105 20 98.8 °F (37.1 °C) 97 %      MAP       --         Physical Exam    Nursing note and vitals reviewed.  Constitutional: He appears well-developed and well-nourished.   Smell of ETOH   HENT:   Head: Normocephalic and atraumatic.   Mouth/Throat: Oropharynx is clear and moist.   Eyes: Conjunctivae and EOM are normal. Pupils are equal, round, and reactive to light.   Neck: Normal range of motion. Neck supple.   Cardiovascular: Normal rate, regular rhythm and normal heart sounds.   Pulmonary/Chest: Breath sounds normal.   Abdominal: Soft. Bowel sounds are normal.   Musculoskeletal: Normal range of motion.   Neurological: He is alert and oriented to person, place, and time. He has normal strength.   Skin: Skin is warm and dry.   Psychiatric: He has a normal mood and affect. Thought content normal.         ED Course   Procedures  Labs Reviewed   CBC W/  AUTO DIFFERENTIAL - Abnormal; Notable for the following components:       Result Value    RBC 4.05 (*)     Hematocrit 39.2 (*)     Mean Corpuscular Hemoglobin 35.6 (*)     Mean Corpuscular Hemoglobin Conc 36.7 (*)     MPV 8.7 (*)     All other components within normal limits   COMPREHENSIVE METABOLIC PANEL - Abnormal; Notable for the following components:    CO2 21 (*)     Glucose 129 (*)     AST 78 (*)      (*)     Anion Gap 19 (*)     All other components within normal limits   ALCOHOL,MEDICAL (ETHANOL) - Abnormal; Notable for the following components:    Alcohol, Medical, Serum 146 (*)     All other components within normal limits   LIPASE     Results for orders placed or performed during the hospital encounter of 08/30/20   CBC auto differential   Result Value Ref Range    WBC 4.80 3.90 - 12.70 K/uL    RBC 4.05 (L) 4.60 - 6.20 M/uL    Hemoglobin 14.4 14.0 - 18.0 g/dL    Hematocrit 39.2 (L) 40.0 - 54.0 %    Mean Corpuscular Volume 97 82 - 98 fL    Mean Corpuscular Hemoglobin 35.6 (H) 27.0 - 31.0 pg    Mean Corpuscular Hemoglobin Conc 36.7 (H) 32.0 - 36.0 g/dL    RDW 12.9 11.5 - 14.5 %    Platelets 156 150 - 350 K/uL    MPV 8.7 (L) 9.2 - 12.9 fL    Immature Granulocytes 0.2 0.0 - 0.5 %    Gran # (ANC) 2.9 1.8 - 7.7 K/uL    Immature Grans (Abs) 0.01 0.00 - 0.04 K/uL    Lymph # 1.5 1.0 - 4.8 K/uL    Mono # 0.3 0.3 - 1.0 K/uL    Eos # 0.1 0.0 - 0.5 K/uL    Baso # 0.03 0.00 - 0.20 K/uL    nRBC 0 0 /100 WBC    Gran% 60.1 38.0 - 73.0 %    Lymph% 31.3 18.0 - 48.0 %    Mono% 6.3 4.0 - 15.0 %    Eosinophil% 1.5 0.0 - 8.0 %    Basophil% 0.6 0.0 - 1.9 %    Differential Method Automated    Comprehensive metabolic panel   Result Value Ref Range    Sodium 139 136 - 145 mmol/L    Potassium 3.7 3.5 - 5.1 mmol/L    Chloride 99 95 - 110 mmol/L    CO2 21 (L) 23 - 29 mmol/L    Glucose 129 (H) 70 - 110 mg/dL    BUN, Bld 12 8 - 23 mg/dL    Creatinine 0.8 0.5 - 1.4 mg/dL    Calcium 8.8 8.7 - 10.5 mg/dL    Total Protein 7.6 6.0 -  8.4 g/dL    Albumin 4.3 3.5 - 5.2 g/dL    Total Bilirubin 0.9 0.1 - 1.0 mg/dL    Alkaline Phosphatase 82 55 - 135 U/L    AST 78 (H) 10 - 40 U/L     (H) 10 - 44 U/L    Anion Gap 19 (H) 8 - 16 mmol/L    eGFR if African American >60.0 >60 mL/min/1.73 m^2    eGFR if non African American >60.0 >60 mL/min/1.73 m^2   Lipase   Result Value Ref Range    Lipase 41 4 - 60 U/L   Ethanol   Result Value Ref Range    Alcohol, Medical, Serum 146 (H) <10 mg/dL            Imaging Results          CT Abdomen Pelvis  Without Contrast (Final result)  Result time 08/30/20 18:04:17    Final result by Coleman uNr MD (08/30/20 18:04:17)                 Impression:      Gallbladder is distended with a possible small volume of sludge.  No wall thickening or focal inflammation.  Clinical concerns to dictate the role for further evaluation with right upper quadrant ultrasound.    Hepatic steatosis.    All CT scans at this facility are performed  using dose modulation techniques as appropriate to performed exam including the following:  automated exposure control; adjustment of mA and/or kV according to the patients size (this includes techniques or standardized protocols for targeted exams where dose is matched to indication/reason for exam: i.e. extremities or head);  iterative reconstruction technique.      Electronically signed by: Coleman Nur  Date:    08/30/2020  Time:    18:04             Narrative:    EXAMINATION:  CT ABDOMEN PELVIS WITHOUT CONTRAST    CLINICAL HISTORY:  Nausea/vomiting;    TECHNIQUE:  Low dose axial images, sagittal and coronal reformations were obtained from the lung bases to the pubic symphysis.  Contrast was not administered.    COMPARISON:  None    FINDINGS:  Heart: Normal in size. No pericardial effusion.    Lung Bases: Well aerated, without consolidation or pleural fluid.    Liver: Hepatic steatosis.    Gallbladder: Gallbladder is distended.  Probable small volume of sludge in the gallbladder.   Consider correlation with ultrasound.    Bile Ducts: No evidence of dilated ducts.    Pancreas: No mass or peripancreatic fat stranding.    Spleen: Unremarkable.    Adrenals: Unremarkable.    Kidneys/ Ureters: Unremarkable.    Bladder: No evidence of wall thickening.    Reproductive organs: Unremarkable.    GI Tract/Mesentery: Stomach appears unremarkable.  Small large bowel are within normal limits.  The appendix is well visualized appears unremarkable.  Few scattered colonic diverticula without diverticulitis.    Peritoneal Space: No ascites. No free air.    Retroperitoneum: No significant adenopathy.    Abdominal wall: Unremarkable.    Vasculature: No significant atherosclerosis or aneurysm.    Bones: Lumbar spine degenerative change.  Bilateral L5 pars defects.  No fracture.                            5:24 PM - Counseling: Spoke with the patient and discussed todays findings, in addition to providing specific details for the plan of care and counseling regarding the diagnosis and prognosis. Questions are answered at this time. Pt c/o Epigastric pain upon discharge, will obtain CT A/P as pt is in ER multiple times for Abd pain and vomiting. I will exclude further pathology prior to d/c.    6:00 PM Repeat abd exam benign, soft. Sx improved. I discussed with patient and/or family/caretaker that evaluation in the ED does not suggest any emergent or life threatening medical conditions requiring immediate intervention beyond what was provided in the ED, and I believe patient is safe for discharge.  Regardless, an unremarkable evaluation in the ED does not preclude the development or presence of a serious of life threatening condition. As such, patient was instructed to return immediately for any worsening or change in current symptoms.    \                                       Clinical Impression:       ICD-10-CM ICD-9-CM   1. Alcohol abuse  F10.10 305.00   2. Non-intractable vomiting with nausea, unspecified  vomiting type  R11.2 787.01         Disposition:   Disposition: Discharged  Condition: Stable     ED Disposition Condition    Discharge Stable        ED Prescriptions     Medication Sig Dispense Start Date End Date Auth. Provider    ondansetron (ZOFRAN) 4 MG tablet Take 1 tablet (4 mg total) by mouth every 8 (eight) hours as needed. 12 tablet 8/30/2020  Nasir Lauren MD    LORazepam (ATIVAN) 1 MG tablet Take 1 tablet (1 mg total) by mouth every 8 (eight) hours as needed for Anxiety. 12 tablet 8/30/2020 9/3/2020 Nasir Lauren MD        Follow-up Information     Follow up With Specialties Details Why Contact Info    Tony Paul DO Family Medicine Call in 1 day  [address] [address]  [phone]      [facility] Emergency Medicine  If symptoms worsen [id number] [address] [id number] [phone]                                     Nasir Lauren MD  08/30/20 1725       Nasir Lauren MD  09/01/20 0714

## 2020-09-11 ENCOUNTER — HOSPITAL ENCOUNTER (OUTPATIENT)
Facility: HOSPITAL | Age: 65
Discharge: HOME | End: 2020-09-13
Attending: EMERGENCY MEDICINE | Admitting: INTERNAL MEDICINE
Payer: COMMERCIAL

## 2020-09-11 DIAGNOSIS — D69.6 THROMBOCYTOPENIA: ICD-10-CM

## 2020-09-11 DIAGNOSIS — R00.0 HEART RATE FAST: ICD-10-CM

## 2020-09-11 DIAGNOSIS — R10.13 EPIGASTRIC PAIN: ICD-10-CM

## 2020-09-11 DIAGNOSIS — F10.229 ACUTE ALCOHOLIC INTOXICATION IN ALCOHOLISM WITH COMPLICATION: ICD-10-CM

## 2020-09-11 DIAGNOSIS — R74.01 TRANSAMINITIS: ICD-10-CM

## 2020-09-11 DIAGNOSIS — R11.2 INTRACTABLE VOMITING WITH NAUSEA: Primary | ICD-10-CM

## 2020-09-11 LAB
ALBUMIN SERPL BCP-MCNC: 4.7 G/DL (ref 3.5–5.2)
ALP SERPL-CCNC: 98 U/L (ref 55–135)
ALT SERPL W/O P-5'-P-CCNC: 95 U/L (ref 10–44)
ANION GAP SERPL CALC-SCNC: 23 MMOL/L (ref 8–16)
AST SERPL-CCNC: 140 U/L (ref 10–40)
B-OH-BUTYR BLD STRIP-SCNC: 0.6 MMOL/L (ref 0–0.5)
BASOPHILS # BLD AUTO: 0.03 K/UL (ref 0–0.2)
BASOPHILS NFR BLD: 0.7 % (ref 0–1.9)
BILIRUB SERPL-MCNC: 1.4 MG/DL (ref 0.1–1)
BUN SERPL-MCNC: 8 MG/DL (ref 8–23)
CALCIUM SERPL-MCNC: 8.7 MG/DL (ref 8.7–10.5)
CHLORIDE SERPL-SCNC: 100 MMOL/L (ref 95–110)
CO2 SERPL-SCNC: 19 MMOL/L (ref 23–29)
CREAT SERPL-MCNC: 0.9 MG/DL (ref 0.5–1.4)
DIFFERENTIAL METHOD: ABNORMAL
EOSINOPHIL # BLD AUTO: 0.1 K/UL (ref 0–0.5)
EOSINOPHIL NFR BLD: 2 % (ref 0–8)
ERYTHROCYTE [DISTWIDTH] IN BLOOD BY AUTOMATED COUNT: 14.6 % (ref 11.5–14.5)
EST. GFR  (AFRICAN AMERICAN): >60 ML/MIN/1.73 M^2
EST. GFR  (NON AFRICAN AMERICAN): >60 ML/MIN/1.73 M^2
ETHANOL SERPL-MCNC: 268 MG/DL
GLUCOSE SERPL-MCNC: 115 MG/DL (ref 70–110)
HCT VFR BLD AUTO: 45.7 % (ref 40–54)
HGB BLD-MCNC: 16.3 G/DL (ref 14–18)
IMM GRANULOCYTES # BLD AUTO: 0.01 K/UL (ref 0–0.04)
IMM GRANULOCYTES NFR BLD AUTO: 0.2 % (ref 0–0.5)
LIPASE SERPL-CCNC: 45 U/L (ref 4–60)
LYMPHOCYTES # BLD AUTO: 1 K/UL (ref 1–4.8)
LYMPHOCYTES NFR BLD: 24.6 % (ref 18–48)
MAGNESIUM SERPL-MCNC: 1.4 MG/DL (ref 1.6–2.6)
MCH RBC QN AUTO: 35.7 PG (ref 27–31)
MCHC RBC AUTO-ENTMCNC: 35.7 G/DL (ref 32–36)
MCV RBC AUTO: 100 FL (ref 82–98)
MONOCYTES # BLD AUTO: 0.3 K/UL (ref 0.3–1)
MONOCYTES NFR BLD: 6.9 % (ref 4–15)
NEUTROPHILS # BLD AUTO: 2.7 K/UL (ref 1.8–7.7)
NEUTROPHILS NFR BLD: 65.6 % (ref 38–73)
NRBC BLD-RTO: 0 /100 WBC
PHOSPHATE SERPL-MCNC: 2.6 MG/DL (ref 2.7–4.5)
PLATELET # BLD AUTO: 103 K/UL (ref 150–350)
PMV BLD AUTO: 8.9 FL (ref 9.2–12.9)
POTASSIUM SERPL-SCNC: 3.4 MMOL/L (ref 3.5–5.1)
PROT SERPL-MCNC: 7.7 G/DL (ref 6–8.4)
RBC # BLD AUTO: 4.57 M/UL (ref 4.6–6.2)
SODIUM SERPL-SCNC: 142 MMOL/L (ref 136–145)
WBC # BLD AUTO: 4.07 K/UL (ref 3.9–12.7)

## 2020-09-11 PROCEDURE — 96368 THER/DIAG CONCURRENT INF: CPT | Mod: ER

## 2020-09-11 PROCEDURE — 84100 ASSAY OF PHOSPHORUS: CPT | Mod: ER

## 2020-09-11 PROCEDURE — 93005 ELECTROCARDIOGRAM TRACING: CPT | Mod: ER

## 2020-09-11 PROCEDURE — 96366 THER/PROPH/DIAG IV INF ADDON: CPT | Mod: ER

## 2020-09-11 PROCEDURE — 80053 COMPREHEN METABOLIC PANEL: CPT | Mod: ER

## 2020-09-11 PROCEDURE — 25000003 PHARM REV CODE 250: Mod: ER | Performed by: EMERGENCY MEDICINE

## 2020-09-11 PROCEDURE — 99285 EMERGENCY DEPT VISIT HI MDM: CPT | Mod: 25,ER

## 2020-09-11 PROCEDURE — 83690 ASSAY OF LIPASE: CPT | Mod: ER

## 2020-09-11 PROCEDURE — 83735 ASSAY OF MAGNESIUM: CPT | Mod: ER

## 2020-09-11 PROCEDURE — 82010 KETONE BODYS QUAN: CPT | Mod: ER

## 2020-09-11 PROCEDURE — 80320 DRUG SCREEN QUANTALCOHOLS: CPT | Mod: ER

## 2020-09-11 PROCEDURE — 96365 THER/PROPH/DIAG IV INF INIT: CPT | Mod: ER

## 2020-09-11 PROCEDURE — 96375 TX/PRO/DX INJ NEW DRUG ADDON: CPT | Mod: ER

## 2020-09-11 PROCEDURE — 85025 COMPLETE CBC W/AUTO DIFF WBC: CPT | Mod: ER

## 2020-09-11 PROCEDURE — 63600175 PHARM REV CODE 636 W HCPCS: Mod: ER | Performed by: EMERGENCY MEDICINE

## 2020-09-11 PROCEDURE — 93010 EKG 12-LEAD: ICD-10-PCS | Mod: ,,, | Performed by: INTERNAL MEDICINE

## 2020-09-11 PROCEDURE — 93010 ELECTROCARDIOGRAM REPORT: CPT | Mod: ,,, | Performed by: INTERNAL MEDICINE

## 2020-09-11 RX ORDER — ONDANSETRON 2 MG/ML
8 INJECTION INTRAMUSCULAR; INTRAVENOUS
Status: COMPLETED | OUTPATIENT
Start: 2020-09-11 | End: 2020-09-11

## 2020-09-11 RX ADMIN — THIAMINE HYDROCHLORIDE 100 MG: 100 INJECTION, SOLUTION INTRAMUSCULAR; INTRAVENOUS at 10:09

## 2020-09-11 RX ADMIN — FOLIC ACID 1 MG: 5 INJECTION, SOLUTION INTRAMUSCULAR; INTRAVENOUS; SUBCUTANEOUS at 10:09

## 2020-09-11 RX ADMIN — ONDANSETRON 8 MG: 2 INJECTION INTRAMUSCULAR; INTRAVENOUS at 11:09

## 2020-09-11 RX ADMIN — ASCORBIC ACID, VITAMIN A PALMITATE, CHOLECALCIFEROL, THIAMINE HYDROCHLORIDE, RIBOFLAVIN-5 PHOSPHATE SODIUM, PYRIDOXINE HYDROCHLORIDE, NIACINAMIDE, DEXPANTHENOL, ALPHA-TOCOPHEROL ACETATE, VITAMIN K1, FOLIC ACID, BIOTIN, CYANOCOBALAMIN: 200; 3300; 200; 6; 3.6; 6; 40; 15; 10; 150; 600; 60; 5 INJECTION, SOLUTION INTRAVENOUS at 10:09

## 2020-09-12 PROBLEM — F10.229: Status: ACTIVE | Noted: 2020-09-12

## 2020-09-12 PROBLEM — R79.89 ELEVATED LFTS: Status: ACTIVE | Noted: 2020-09-12

## 2020-09-12 LAB
ALBUMIN SERPL BCP-MCNC: 4.4 G/DL (ref 3.5–5.2)
ALP SERPL-CCNC: 85 U/L (ref 55–135)
ALT SERPL W/O P-5'-P-CCNC: 88 U/L (ref 10–44)
ANION GAP SERPL CALC-SCNC: 23 MMOL/L (ref 8–16)
AST SERPL-CCNC: 124 U/L (ref 10–40)
BASOPHILS # BLD AUTO: 0.01 K/UL (ref 0–0.2)
BASOPHILS NFR BLD: 0.2 % (ref 0–1.9)
BILIRUB SERPL-MCNC: 1.8 MG/DL (ref 0.1–1)
BUN SERPL-MCNC: 11 MG/DL (ref 8–23)
CALCIUM SERPL-MCNC: 8.5 MG/DL (ref 8.7–10.5)
CHLORIDE SERPL-SCNC: 99 MMOL/L (ref 95–110)
CO2 SERPL-SCNC: 18 MMOL/L (ref 23–29)
CREAT SERPL-MCNC: 0.8 MG/DL (ref 0.5–1.4)
DIFFERENTIAL METHOD: ABNORMAL
EOSINOPHIL # BLD AUTO: 0 K/UL (ref 0–0.5)
EOSINOPHIL NFR BLD: 0.2 % (ref 0–8)
ERYTHROCYTE [DISTWIDTH] IN BLOOD BY AUTOMATED COUNT: 14.8 % (ref 11.5–14.5)
EST. GFR  (AFRICAN AMERICAN): >60 ML/MIN/1.73 M^2
EST. GFR  (NON AFRICAN AMERICAN): >60 ML/MIN/1.73 M^2
ETHANOL SERPL-MCNC: 84 MG/DL
GLUCOSE SERPL-MCNC: 131 MG/DL (ref 70–110)
HCT VFR BLD AUTO: 40.4 % (ref 40–54)
HGB BLD-MCNC: 14.5 G/DL (ref 14–18)
IMM GRANULOCYTES # BLD AUTO: 0.01 K/UL (ref 0–0.04)
IMM GRANULOCYTES NFR BLD AUTO: 0.2 % (ref 0–0.5)
LYMPHOCYTES # BLD AUTO: 0.4 K/UL (ref 1–4.8)
LYMPHOCYTES NFR BLD: 7.4 % (ref 18–48)
MCH RBC QN AUTO: 36 PG (ref 27–31)
MCHC RBC AUTO-ENTMCNC: 35.9 G/DL (ref 32–36)
MCV RBC AUTO: 100 FL (ref 82–98)
MONOCYTES # BLD AUTO: 0.3 K/UL (ref 0.3–1)
MONOCYTES NFR BLD: 5.1 % (ref 4–15)
NEUTROPHILS # BLD AUTO: 5.1 K/UL (ref 1.8–7.7)
NEUTROPHILS NFR BLD: 86.9 % (ref 38–73)
NRBC BLD-RTO: 0 /100 WBC
PLATELET # BLD AUTO: 100 K/UL (ref 150–350)
PMV BLD AUTO: 9.1 FL (ref 9.2–12.9)
POTASSIUM SERPL-SCNC: 4 MMOL/L (ref 3.5–5.1)
PROT SERPL-MCNC: 7.2 G/DL (ref 6–8.4)
RBC # BLD AUTO: 4.03 M/UL (ref 4.6–6.2)
SARS-COV-2 RDRP RESP QL NAA+PROBE: NEGATIVE
SODIUM SERPL-SCNC: 140 MMOL/L (ref 136–145)
WBC # BLD AUTO: 5.84 K/UL (ref 3.9–12.7)

## 2020-09-12 PROCEDURE — G0378 HOSPITAL OBSERVATION PER HR: HCPCS | Mod: ER

## 2020-09-12 PROCEDURE — 96375 TX/PRO/DX INJ NEW DRUG ADDON: CPT

## 2020-09-12 PROCEDURE — 96368 THER/DIAG CONCURRENT INF: CPT | Mod: ER

## 2020-09-12 PROCEDURE — 96376 TX/PRO/DX INJ SAME DRUG ADON: CPT

## 2020-09-12 PROCEDURE — 25000003 PHARM REV CODE 250: Performed by: FAMILY MEDICINE

## 2020-09-12 PROCEDURE — C9113 INJ PANTOPRAZOLE SODIUM, VIA: HCPCS | Mod: ER | Performed by: EMERGENCY MEDICINE

## 2020-09-12 PROCEDURE — S4991 NICOTINE PATCH NONLEGEND: HCPCS | Performed by: NURSE PRACTITIONER

## 2020-09-12 PROCEDURE — 25000003 PHARM REV CODE 250: Performed by: NURSE PRACTITIONER

## 2020-09-12 PROCEDURE — 63600175 PHARM REV CODE 636 W HCPCS: Performed by: NURSE PRACTITIONER

## 2020-09-12 PROCEDURE — 63600175 PHARM REV CODE 636 W HCPCS: Performed by: FAMILY MEDICINE

## 2020-09-12 PROCEDURE — 94761 N-INVAS EAR/PLS OXIMETRY MLT: CPT

## 2020-09-12 PROCEDURE — 85025 COMPLETE CBC W/AUTO DIFF WBC: CPT | Mod: ER

## 2020-09-12 PROCEDURE — 63600175 PHARM REV CODE 636 W HCPCS: Mod: ER | Performed by: EMERGENCY MEDICINE

## 2020-09-12 PROCEDURE — 80320 DRUG SCREEN QUANTALCOHOLS: CPT | Mod: ER

## 2020-09-12 PROCEDURE — 80053 COMPREHEN METABOLIC PANEL: CPT | Mod: ER

## 2020-09-12 PROCEDURE — S5010 5% DEXTROSE AND 0.45% SALINE: HCPCS | Mod: ER | Performed by: EMERGENCY MEDICINE

## 2020-09-12 PROCEDURE — 96360 HYDRATION IV INFUSION INIT: CPT | Mod: 59

## 2020-09-12 PROCEDURE — 25000003 PHARM REV CODE 250: Mod: ER | Performed by: EMERGENCY MEDICINE

## 2020-09-12 PROCEDURE — 96366 THER/PROPH/DIAG IV INF ADDON: CPT | Mod: ER

## 2020-09-12 PROCEDURE — 96361 HYDRATE IV INFUSION ADD-ON: CPT

## 2020-09-12 PROCEDURE — G0378 HOSPITAL OBSERVATION PER HR: HCPCS

## 2020-09-12 PROCEDURE — U0002 COVID-19 LAB TEST NON-CDC: HCPCS | Mod: ER

## 2020-09-12 RX ORDER — CHLORDIAZEPOXIDE HYDROCHLORIDE 25 MG/1
25 CAPSULE, GELATIN COATED ORAL 3 TIMES DAILY
Status: DISCONTINUED | OUTPATIENT
Start: 2020-09-12 | End: 2020-09-13 | Stop reason: HOSPADM

## 2020-09-12 RX ORDER — SODIUM CHLORIDE 0.9 % (FLUSH) 0.9 %
10 SYRINGE (ML) INJECTION
Status: DISCONTINUED | OUTPATIENT
Start: 2020-09-12 | End: 2020-09-13 | Stop reason: HOSPADM

## 2020-09-12 RX ORDER — DIAZEPAM 10 MG/2ML
5 INJECTION INTRAMUSCULAR EVERY 6 HOURS PRN
Status: DISCONTINUED | OUTPATIENT
Start: 2020-09-12 | End: 2020-09-12

## 2020-09-12 RX ORDER — DEXTROSE MONOHYDRATE AND SODIUM CHLORIDE 5; .45 G/100ML; G/100ML
1000 INJECTION, SOLUTION INTRAVENOUS CONTINUOUS
Status: DISCONTINUED | OUTPATIENT
Start: 2020-09-12 | End: 2020-09-12

## 2020-09-12 RX ORDER — SODIUM CHLORIDE 9 MG/ML
INJECTION, SOLUTION INTRAVENOUS CONTINUOUS
Status: DISCONTINUED | OUTPATIENT
Start: 2020-09-12 | End: 2020-09-13 | Stop reason: HOSPADM

## 2020-09-12 RX ORDER — PANTOPRAZOLE SODIUM 40 MG/10ML
40 INJECTION, POWDER, LYOPHILIZED, FOR SOLUTION INTRAVENOUS
Status: COMPLETED | OUTPATIENT
Start: 2020-09-12 | End: 2020-09-12

## 2020-09-12 RX ORDER — METOCLOPRAMIDE HYDROCHLORIDE 5 MG/ML
10 INJECTION INTRAMUSCULAR; INTRAVENOUS
Status: COMPLETED | OUTPATIENT
Start: 2020-09-12 | End: 2020-09-12

## 2020-09-12 RX ORDER — HYDROXYZINE PAMOATE 25 MG/1
25 CAPSULE ORAL
Status: COMPLETED | OUTPATIENT
Start: 2020-09-12 | End: 2020-09-12

## 2020-09-12 RX ORDER — IBUPROFEN 200 MG
1 TABLET ORAL DAILY
Status: DISCONTINUED | OUTPATIENT
Start: 2020-09-12 | End: 2020-09-13 | Stop reason: HOSPADM

## 2020-09-12 RX ADMIN — CHLORDIAZEPOXIDE HYDROCHLORIDE 25 MG: 25 CAPSULE ORAL at 04:09

## 2020-09-12 RX ADMIN — SODIUM CHLORIDE: 0.9 INJECTION, SOLUTION INTRAVENOUS at 07:09

## 2020-09-12 RX ADMIN — Medication 1 PATCH: at 12:09

## 2020-09-12 RX ADMIN — PANTOPRAZOLE SODIUM 40 MG: 40 INJECTION, POWDER, LYOPHILIZED, FOR SOLUTION INTRAVENOUS at 05:09

## 2020-09-12 RX ADMIN — LORAZEPAM 2 MG: 2 INJECTION INTRAMUSCULAR; INTRAVENOUS at 11:09

## 2020-09-12 RX ADMIN — CHLORDIAZEPOXIDE HYDROCHLORIDE 25 MG: 25 CAPSULE ORAL at 11:09

## 2020-09-12 RX ADMIN — METOCLOPRAMIDE 10 MG: 5 INJECTION, SOLUTION INTRAMUSCULAR; INTRAVENOUS at 05:09

## 2020-09-12 RX ADMIN — DIAZEPAM 5 MG: 5 INJECTION, SOLUTION INTRAMUSCULAR; INTRAVENOUS at 07:09

## 2020-09-12 RX ADMIN — FOLIC ACID: 5 INJECTION, SOLUTION INTRAMUSCULAR; INTRAVENOUS; SUBCUTANEOUS at 12:09

## 2020-09-12 RX ADMIN — HYDROXYZINE PAMOATE 25 MG: 25 CAPSULE ORAL at 12:09

## 2020-09-12 RX ADMIN — CHLORDIAZEPOXIDE HYDROCHLORIDE 25 MG: 25 CAPSULE ORAL at 09:09

## 2020-09-12 RX ADMIN — PROMETHAZINE HYDROCHLORIDE 12.5 MG: 25 INJECTION INTRAMUSCULAR; INTRAVENOUS at 12:09

## 2020-09-12 RX ADMIN — DEXTROSE AND SODIUM CHLORIDE 1000 ML: 5; .45 INJECTION, SOLUTION INTRAVENOUS at 07:09

## 2020-09-12 RX ADMIN — LORAZEPAM 2 MG: 2 INJECTION INTRAMUSCULAR; INTRAVENOUS at 05:09

## 2020-09-12 RX ADMIN — LORAZEPAM 1 MG: 2 INJECTION INTRAMUSCULAR; INTRAVENOUS at 10:09

## 2020-09-12 NOTE — H&P
"[facility] Hospital Medicine  History & Physical    Patient Name: Barb Carson  MRN: [id number]  Admission Date: 9/11/2020  Attending Physician: Piero Talbert MD   Primary Care Provider: Tony Paul DO         Patient information was obtained from patient and ER records.     Subjective:     Principal Problem:Acute alcoholic intoxication in alcoholism with complication    Chief Complaint:   Chief Complaint   Patient presents with    Emesis     Pt states "sick from drinking (alcohol)"        HPI: Mr Carson is a 64 year old male with PMHx of ETOH abuse, tobacco abuse, anxiety and depression who presented to [facility] with conplaints of nausea, vomiting and tremors. Symptoms worsen on yesterday. Transferred to [facility] for further evaluation and treatment. Associated symptoms include abdominal pain. Denies associated symptoms of chest pain, shortness of breath, fever, chills or diaphoresis. ETOH level 84 on admission. He continues to experiencing nausea, vomiting and tremors. Patient is a full code, brother is surrogate decision maker. He is being placed in Observation under the care of Hospital Medicine.     Past Medical History:   Diagnosis Date    Alcohol abuse     Alcohol dependence     Anxiety     Depression     History of suicidal ideation        History reviewed. No pertinent surgical history.    Review of patient's allergies indicates:  No Known Allergies    No current facility-administered medications on file prior to encounter.      Current Outpatient Medications on File Prior to Encounter   Medication Sig    buPROPion (WELLBUTRIN XL) 300 MG 24 hr tablet Take 1 tablet (300 mg total) by mouth once daily.    escitalopram oxalate (LEXAPRO) 10 MG tablet Take 10 mg by mouth once daily.    fluticasone propionate (FLONASE) 50 mcg/actuation nasal spray 2 sprays (100 mcg total) by Each Nostril route once daily.    folic acid (FOLVITE) 1 MG tablet TAKE ONE TABLET BY MOUTH AS NEEDED AS " DIRECTED    LORazepam (ATIVAN) 1 MG tablet Take 1 tablet (1 mg total) by mouth 2 (two) times daily as needed (Anxiety/Tremors).    multivitamin capsule Take 1 capsule by mouth once daily.    naltrexone (DEPADE) 50 mg tablet Take 50 mg by mouth nightly. at bedtime.    cetirizine (ZYRTEC) 10 MG tablet Take 1 tablet (10 mg total) by mouth once daily.    chlordiazepoxide (LIBRIUM) 10 MG capsule Take 1 capsule (10 mg total) by mouth 3 (three) times daily for 3 days, THEN 1 capsule (10 mg total) 2 (two) times daily for 3 days, THEN 1 capsule (10 mg total) once daily for 3 days.    ibuprofen (ADVIL,MOTRIN) 200 MG tablet Take 200 mg by mouth.    ondansetron (ZOFRAN) 4 MG tablet Take 1 tablet (4 mg total) by mouth every 8 (eight) hours as needed.    ondansetron (ZOFRAN-ODT) 4 MG TbDL Take 1 tablet (4 mg total) by mouth every 6 (six) hours as needed (NAUSEA/VOMITING).    promethazine (PHENERGAN) 25 MG tablet Take 1 tablet (25 mg total) by mouth every 6 (six) hours as needed for Nausea. (Patient not taking: Reported on 8/12/2020)     Family History     None        Tobacco Use    Smoking status: Current Every Day Smoker     Packs/day: 1.00     Types: Cigarettes    Smokeless tobacco: Never Used   Substance and Sexual Activity    Alcohol use: Yes     Comment: last drink Thursday 9/10 daily use     Drug use: No    Sexual activity: Not Currently     Review of Systems   Constitutional: Negative for chills, diaphoresis, fatigue and fever.   HENT: Negative for congestion, ear discharge, rhinorrhea, sinus pressure, sore throat and trouble swallowing.    Eyes: Negative for discharge and visual disturbance.   Respiratory: Negative for apnea, cough, choking, chest tightness, shortness of breath, wheezing and stridor.    Cardiovascular: Negative for chest pain, palpitations and leg swelling.   Gastrointestinal: Positive for abdominal pain, nausea and vomiting. Negative for abdominal distention and diarrhea.   Endocrine:  Negative for cold intolerance and heat intolerance.   Genitourinary: Negative for dysuria, frequency and hematuria.   Musculoskeletal: Negative for arthralgias, back pain, myalgias and neck pain.   Skin: Negative for pallor and rash.   Neurological: Positive for tremors. Negative for dizziness, seizures, syncope, weakness and headaches.   Psychiatric/Behavioral: Negative for agitation, confusion and sleep disturbance. The patient is nervous/anxious.      Objective:     Vital Signs (Most Recent):  Temp: 97.8 °F (36.6 °C) (09/12/20 0738)  Pulse: 81 (09/12/20 0815)  Resp: 18 (09/12/20 0815)  BP: 120/69 (09/12/20 0815)  SpO2: 95 % (09/12/20 0815) Vital Signs (24h Range):  Temp:  [97.6 °F (36.4 °C)-98.3 °F (36.8 °C)] 97.8 °F (36.6 °C)  Pulse:  [81-94] 81  Resp:  [18-22] 18  SpO2:  [95 %-98 %] 95 %  BP: (120-143)/(69-94) 120/69     Weight: 81.6 kg (180 lb)  Body mass index is 26.58 kg/m².    Physical Exam  Vitals signs and nursing note reviewed.   Constitutional:       General: He is not in acute distress.     Appearance: He is underweight. He is not ill-appearing, toxic-appearing or diaphoretic.   HENT:      Head: Normocephalic and atraumatic.      Nose: No congestion or rhinorrhea.   Cardiovascular:      Heart sounds: No murmur. No friction rub.   Pulmonary:      Effort: No respiratory distress.      Breath sounds: No stridor. No wheezing, rhonchi or rales.   Chest:      Chest wall: No tenderness.   Abdominal:      General: There is no distension.      Palpations: There is no mass.      Tenderness: There is abdominal tenderness. There is no right CVA tenderness, left CVA tenderness, guarding or rebound.      Hernia: No hernia is present.   Musculoskeletal:         General: No swelling, tenderness, deformity or signs of injury.      Right lower leg: No edema.      Left lower leg: No edema.      Comments: Tremors present    Skin:     Coloration: Skin is not jaundiced or pale.      Findings: No bruising, erythema, lesion  or rash.   Neurological:      General: No focal deficit present.   Psychiatric:         Mood and Affect: Mood is anxious.             Significant Labs:   CBC:   Recent Labs   Lab 09/11/20 2205 09/12/20  0518   WBC 4.07 5.84   HGB 16.3 14.5   HCT 45.7 40.4   * 100*     CMP:   Recent Labs   Lab 09/11/20 2205 09/12/20  0518    140   K 3.4* 4.0    99   CO2 19* 18*   * 131*   BUN 8 11   CREATININE 0.9 0.8   CALCIUM 8.7 8.5*   PROT 7.7 7.2   ALBUMIN 4.7 4.4   BILITOT 1.4* 1.8*   ALKPHOS 98 85   * 124*   ALT 95* 88*   ANIONGAP 23* 23*   EGFRNONAA >60.0 >60.0           Assessment/Plan:     * Acute alcoholic intoxication in alcoholism with complication  Place in Observation   Scheduled Librium  Ativan 1 mg IV every 6 hours PRN withdraws   IVF's   Banana bag daily    Antiemetic as needed    for ETOH cessations resources after discharge       Elevated LFTs  Related to ETOH abuse   Monitor       Anxiety  Continue scheduled Librium   Monitor   Restart home med as needed       Emesis  Protonix IV given in ED   Antemetic as needed       Tobacco abuse   1 ppd smoker   Encouraged smoking cessation   Nicotine patch         VTE Risk Mitigation (From admission, onward)         Ordered     Place sequential compression device  Until discontinued      09/12/20 1121                   Manuel Reynaga NP  Department of Hospital Medicine   [facility]

## 2020-09-12 NOTE — ED PROVIDER NOTES
"Encounter Date: 9/11/2020       History     Chief Complaint   Patient presents with    Emesis     Pt states "sick from drinking (alcohol)"     Patient currently presents with concern regarding nausea and vomiting.  This has been ongoing for most of the afternoon.  The patient's family report that he has a history of alcoholism and has been drinking heavily last evening as well as this morning.  Patient does note 2-3 episodes of diarrhea intermittently over the past few days as well.  Fever and chills are denied.  There currently concern he may be dehydrated.  Patient notes diffuse associated epigastric burning.        Review of patient's allergies indicates:  No Known Allergies  Past Medical History:   Diagnosis Date    Alcohol abuse     Alcohol dependence     Anxiety     Depression     History of suicidal ideation      No past surgical history on file.  No family history on file.  Social History     Tobacco Use    Smoking status: Current Every Day Smoker     Packs/day: 1.00     Types: Cigarettes    Smokeless tobacco: Never Used   Substance Use Topics    Alcohol use: Yes     Comment: last drink was wednesday    Drug use: No     Review of Systems   Constitutional: Negative for chills and fever.   HENT: Negative for congestion.    Respiratory: Negative for chest tightness and shortness of breath.    Cardiovascular: Negative for chest pain and leg swelling.   Gastrointestinal: Positive for diarrhea, nausea and vomiting. Negative for abdominal pain and constipation.   Genitourinary: Negative for dysuria, frequency and urgency.   Skin: Negative for color change and rash.   Allergic/Immunologic: Negative for immunocompromised state.   Neurological: Negative for weakness and numbness.   Hematological: Negative for adenopathy. Does not bruise/bleed easily.   All other systems reviewed and are negative.    Physical Exam     Initial Vitals [09/11/20 2148]   BP Pulse Resp Temp SpO2   (!) 133/93 94 18 97.6 °F (36.4 " °C) 95 %      MAP       --         Vitals:    09/11/20 2148 09/12/20 0205   BP: (!) 133/93 (!) 143/94   Pulse: 94 87   Resp: 18 18   Temp: 97.6 °F (36.4 °C)    TempSrc: Oral    SpO2: 95% 98%   Weight: 81.6 kg (180 lb)      Physical Exam    Nursing note and vitals reviewed.  Constitutional: He appears well-developed and well-nourished. He is not diaphoretic. No distress.   HENT:   Head: Normocephalic and atraumatic.   Right Ear: External ear normal.   Left Ear: External ear normal.   Nose: Nose normal.   Mouth/Throat: Oropharynx is clear and moist.   Eyes: Conjunctivae and EOM are normal. Pupils are equal, round, and reactive to light. No scleral icterus.   Neck: Neck supple. No JVD present.   Cardiovascular: Normal rate, regular rhythm, normal heart sounds and intact distal pulses. Exam reveals no gallop and no friction rub.    No murmur heard.  Pulmonary/Chest: Breath sounds normal. No respiratory distress. He has no wheezes. He has no rhonchi. He has no rales.   Abdominal: Soft. Bowel sounds are normal. He exhibits no distension. There is no abdominal tenderness.   Musculoskeletal: Normal range of motion. No edema.   Neurological: He is alert and oriented to person, place, and time. He has normal strength.   Skin: Skin is warm and dry. No rash noted.   Psychiatric: His speech is normal and behavior is normal. Thought content normal. His mood appears anxious. He is attentive.       ED Course   Procedures  Labs Reviewed   COMPREHENSIVE METABOLIC PANEL - Abnormal; Notable for the following components:       Result Value    Potassium 3.4 (*)     CO2 19 (*)     Glucose 115 (*)     Total Bilirubin 1.4 (*)      (*)     ALT 95 (*)     Anion Gap 23 (*)     All other components within normal limits   CBC W/ AUTO DIFFERENTIAL - Abnormal; Notable for the following components:    RBC 4.57 (*)     Mean Corpuscular Volume 100 (*)     Mean Corpuscular Hemoglobin 35.7 (*)     RDW 14.6 (*)     Platelets 103 (*)     MPV 8.9  (*)     All other components within normal limits   MAGNESIUM - Abnormal; Notable for the following components:    Magnesium 1.4 (*)     All other components within normal limits   PHOSPHORUS - Abnormal; Notable for the following components:    Phosphorus 2.6 (*)     All other components within normal limits   ALCOHOL,MEDICAL (ETHANOL) - Abnormal; Notable for the following components:    Alcohol, Medical, Serum 268 (*)     All other components within normal limits   BETA - HYDROXYBUTYRATE, SERUM - Abnormal; Notable for the following components:    Beta-Hydroxybutyrate 0.6 (*)     All other components within normal limits   LIPASE   ALCOHOL,MEDICAL (ETHANOL)     EKG Readings: (Independently Interpreted)   Initial Reading: No STEMI. Rhythm: Sinus Tachycardia. Heart Rate: 110. Ectopy: No Ectopy. Conduction: Normal. Axis: Normal.       Imaging Results    None          Medical Decision Making:   ED Management:  Patient continues to have ongoing emesis despite administration of a banana bag, Zofran, and Phenergan.  We will try adding a PPI along with Reglan and repeat lab work.  At this point we have communicated to the patient that is in his best interest to be placed in for observation for ongoing management of intractable vomiting along with consideration for GI consultation given the frequently recurring nature of this process.  He has been counseled at length that he is showing early signs of liver compromise.  He has also been educated that his recurring abdominal pain and vomiting are likely the result of chronic alcoholic gastritis.    All historical, clinical, radiographic, and laboratory findings were reviewed with the patient/family in detail along with the indications for admission in order to receive ongoing IV fluids, IV antiemetics, cardiac monitoring, and GI consultation for intractable vomiting.  All remaining questions and concerns were addressed at this time and the patient/family communicates  understanding and agrees to proceed accordingly.  Similarly, all pertinent details of the encounter were discussed with Dr. Montes who agrees to receive the patient for further care as outlined above.                                Clinical Impression:       ICD-10-CM ICD-9-CM   1. Intractable vomiting with nausea  R11.2 536.2   2. Epigastric pain  R10.13 789.06   3. Acute alcoholic intoxication in alcoholism with complication  F10.229 303.00   4. Transaminitis  R74.0 790.4   5. Thrombocytopenia  D69.6 287.5             ED Disposition Condition    Observation                             Durga Aguilar MD  09/12/20 1115

## 2020-09-12 NOTE — SUBJECTIVE & OBJECTIVE
Past Medical History:   Diagnosis Date    Alcohol abuse     Alcohol dependence     Anxiety     Depression     History of suicidal ideation        History reviewed. No pertinent surgical history.    Review of patient's allergies indicates:  No Known Allergies    No current facility-administered medications on file prior to encounter.      Current Outpatient Medications on File Prior to Encounter   Medication Sig    buPROPion (WELLBUTRIN XL) 300 MG 24 hr tablet Take 1 tablet (300 mg total) by mouth once daily.    escitalopram oxalate (LEXAPRO) 10 MG tablet Take 10 mg by mouth once daily.    fluticasone propionate (FLONASE) 50 mcg/actuation nasal spray 2 sprays (100 mcg total) by Each Nostril route once daily.    folic acid (FOLVITE) 1 MG tablet TAKE ONE TABLET BY MOUTH AS NEEDED AS DIRECTED    LORazepam (ATIVAN) 1 MG tablet Take 1 tablet (1 mg total) by mouth 2 (two) times daily as needed (Anxiety/Tremors).    multivitamin capsule Take 1 capsule by mouth once daily.    naltrexone (DEPADE) 50 mg tablet Take 50 mg by mouth nightly. at bedtime.    cetirizine (ZYRTEC) 10 MG tablet Take 1 tablet (10 mg total) by mouth once daily.    chlordiazepoxide (LIBRIUM) 10 MG capsule Take 1 capsule (10 mg total) by mouth 3 (three) times daily for 3 days, THEN 1 capsule (10 mg total) 2 (two) times daily for 3 days, THEN 1 capsule (10 mg total) once daily for 3 days.    ibuprofen (ADVIL,MOTRIN) 200 MG tablet Take 200 mg by mouth.    ondansetron (ZOFRAN) 4 MG tablet Take 1 tablet (4 mg total) by mouth every 8 (eight) hours as needed.    ondansetron (ZOFRAN-ODT) 4 MG TbDL Take 1 tablet (4 mg total) by mouth every 6 (six) hours as needed (NAUSEA/VOMITING).    promethazine (PHENERGAN) 25 MG tablet Take 1 tablet (25 mg total) by mouth every 6 (six) hours as needed for Nausea. (Patient not taking: Reported on 8/12/2020)     Family History     None        Tobacco Use    Smoking status: Current Every Day Smoker      Packs/day: 1.00     Types: Cigarettes    Smokeless tobacco: Never Used   Substance and Sexual Activity    Alcohol use: Yes     Comment: last drink Thursday 9/10 daily use     Drug use: No    Sexual activity: Not Currently     Review of Systems   Constitutional: Negative for chills, diaphoresis, fatigue and fever.   HENT: Negative for congestion, ear discharge, rhinorrhea, sinus pressure, sore throat and trouble swallowing.    Eyes: Negative for discharge and visual disturbance.   Respiratory: Negative for apnea, cough, choking, chest tightness, shortness of breath, wheezing and stridor.    Cardiovascular: Negative for chest pain, palpitations and leg swelling.   Gastrointestinal: Positive for abdominal pain, nausea and vomiting. Negative for abdominal distention and diarrhea.   Endocrine: Negative for cold intolerance and heat intolerance.   Genitourinary: Negative for dysuria, frequency and hematuria.   Musculoskeletal: Negative for arthralgias, back pain, myalgias and neck pain.   Skin: Negative for pallor and rash.   Neurological: Positive for tremors. Negative for dizziness, seizures, syncope, weakness and headaches.   Psychiatric/Behavioral: Negative for agitation, confusion and sleep disturbance. The patient is nervous/anxious.      Objective:     Vital Signs (Most Recent):  Temp: 97.8 °F (36.6 °C) (09/12/20 0738)  Pulse: 81 (09/12/20 0815)  Resp: 18 (09/12/20 0815)  BP: 120/69 (09/12/20 0815)  SpO2: 95 % (09/12/20 0815) Vital Signs (24h Range):  Temp:  [97.6 °F (36.4 °C)-98.3 °F (36.8 °C)] 97.8 °F (36.6 °C)  Pulse:  [81-94] 81  Resp:  [18-22] 18  SpO2:  [95 %-98 %] 95 %  BP: (120-143)/(69-94) 120/69     Weight: 81.6 kg (180 lb)  Body mass index is 26.58 kg/m².    Physical Exam  Vitals signs and nursing note reviewed.   Constitutional:       General: He is not in acute distress.     Appearance: He is underweight. He is not ill-appearing, toxic-appearing or diaphoretic.   HENT:      Head: Normocephalic and  atraumatic.      Nose: No congestion or rhinorrhea.   Cardiovascular:      Heart sounds: No murmur. No friction rub.   Pulmonary:      Effort: No respiratory distress.      Breath sounds: No stridor. No wheezing, rhonchi or rales.   Chest:      Chest wall: No tenderness.   Abdominal:      General: There is no distension.      Palpations: There is no mass.      Tenderness: There is abdominal tenderness. There is no right CVA tenderness, left CVA tenderness, guarding or rebound.      Hernia: No hernia is present.   Musculoskeletal:         General: No swelling, tenderness, deformity or signs of injury.      Right lower leg: No edema.      Left lower leg: No edema.      Comments: Tremors present    Skin:     Coloration: Skin is not jaundiced or pale.      Findings: No bruising, erythema, lesion or rash.   Neurological:      General: No focal deficit present.   Psychiatric:         Mood and Affect: Mood is anxious.             Significant Labs:   CBC:   Recent Labs   Lab 09/11/20 2205 09/12/20  0518   WBC 4.07 5.84   HGB 16.3 14.5   HCT 45.7 40.4   * 100*     CMP:   Recent Labs   Lab 09/11/20 2205 09/12/20  0518    140   K 3.4* 4.0    99   CO2 19* 18*   * 131*   BUN 8 11   CREATININE 0.9 0.8   CALCIUM 8.7 8.5*   PROT 7.7 7.2   ALBUMIN 4.7 4.4   BILITOT 1.4* 1.8*   ALKPHOS 98 85   * 124*   ALT 95* 88*   ANIONGAP 23* 23*   EGFRNONAA >60.0 >60.0

## 2020-09-12 NOTE — ASSESSMENT & PLAN NOTE
Place in Observation   Scheduled Librium  Ativan 1 mg IV every 6 hours PRN withdraws   IVF's   Banana bag daily    Antiemetic as needed    for ETOH cessations resources after discharge

## 2020-09-12 NOTE — HPI
Mr Carson is a 64 year old male with PMHx of ETOH abuse, tobacco abuse, anxiety and depression who presented to [facility] with conplaints of nausea, vomiting and tremors. Symptoms worsen on yesterday. Transferred to [facility] for further evaluation and treatment. Associated symptoms include abdominal pain. Denies associated symptoms of chest pain, shortness of breath, fever, chills or diaphoresis. ETOH level 84 on admission. He continues to experiencing nausea, vomiting and tremors. Patient is a full code, brother is surrogate decision maker. He is being placed in Observation under the care of Hospital Medicine.

## 2020-09-13 VITALS
HEART RATE: 115 BPM | TEMPERATURE: 98 F | DIASTOLIC BLOOD PRESSURE: 74 MMHG | WEIGHT: 171.31 LBS | RESPIRATION RATE: 18 BRPM | BODY MASS INDEX: 25.37 KG/M2 | HEIGHT: 69 IN | OXYGEN SATURATION: 97 % | SYSTOLIC BLOOD PRESSURE: 117 MMHG

## 2020-09-13 PROBLEM — F10.229: Status: RESOLVED | Noted: 2020-09-12 | Resolved: 2020-09-13

## 2020-09-13 LAB
ALBUMIN SERPL BCP-MCNC: 3.8 G/DL (ref 3.5–5.2)
ALP SERPL-CCNC: 79 U/L (ref 55–135)
ALT SERPL W/O P-5'-P-CCNC: 88 U/L (ref 10–44)
ANION GAP SERPL CALC-SCNC: 11 MMOL/L (ref 8–16)
AST SERPL-CCNC: 139 U/L (ref 10–40)
BASOPHILS # BLD AUTO: 0.01 K/UL (ref 0–0.2)
BASOPHILS NFR BLD: 0.3 % (ref 0–1.9)
BILIRUB SERPL-MCNC: 2.6 MG/DL (ref 0.1–1)
BUN SERPL-MCNC: 10 MG/DL (ref 8–23)
CALCIUM SERPL-MCNC: 8.7 MG/DL (ref 8.7–10.5)
CHLORIDE SERPL-SCNC: 99 MMOL/L (ref 95–110)
CO2 SERPL-SCNC: 30 MMOL/L (ref 23–29)
CREAT SERPL-MCNC: 0.8 MG/DL (ref 0.5–1.4)
DIFFERENTIAL METHOD: ABNORMAL
EOSINOPHIL # BLD AUTO: 0 K/UL (ref 0–0.5)
EOSINOPHIL NFR BLD: 0.3 % (ref 0–8)
ERYTHROCYTE [DISTWIDTH] IN BLOOD BY AUTOMATED COUNT: 14.8 % (ref 11.5–14.5)
EST. GFR  (AFRICAN AMERICAN): >60 ML/MIN/1.73 M^2
EST. GFR  (NON AFRICAN AMERICAN): >60 ML/MIN/1.73 M^2
GLUCOSE SERPL-MCNC: 62 MG/DL (ref 70–110)
HCT VFR BLD AUTO: 40.7 % (ref 40–54)
HGB BLD-MCNC: 13.9 G/DL (ref 14–18)
IMM GRANULOCYTES # BLD AUTO: 0.01 K/UL (ref 0–0.04)
IMM GRANULOCYTES NFR BLD AUTO: 0.3 % (ref 0–0.5)
LYMPHOCYTES # BLD AUTO: 1.1 K/UL (ref 1–4.8)
LYMPHOCYTES NFR BLD: 29.7 % (ref 18–48)
MCH RBC QN AUTO: 35.4 PG (ref 27–31)
MCHC RBC AUTO-ENTMCNC: 34.2 G/DL (ref 32–36)
MCV RBC AUTO: 104 FL (ref 82–98)
MONOCYTES # BLD AUTO: 0.2 K/UL (ref 0.3–1)
MONOCYTES NFR BLD: 5.3 % (ref 4–15)
NEUTROPHILS # BLD AUTO: 2.4 K/UL (ref 1.8–7.7)
NEUTROPHILS NFR BLD: 64.1 % (ref 38–73)
NRBC BLD-RTO: 0 /100 WBC
PLATELET # BLD AUTO: 74 K/UL (ref 150–350)
PMV BLD AUTO: 9.5 FL (ref 9.2–12.9)
POTASSIUM SERPL-SCNC: 3.1 MMOL/L (ref 3.5–5.1)
PROT SERPL-MCNC: 6.7 G/DL (ref 6–8.4)
RBC # BLD AUTO: 3.93 M/UL (ref 4.6–6.2)
SODIUM SERPL-SCNC: 140 MMOL/L (ref 136–145)
WBC # BLD AUTO: 3.74 K/UL (ref 3.9–12.7)

## 2020-09-13 PROCEDURE — 96361 HYDRATE IV INFUSION ADD-ON: CPT | Mod: 59

## 2020-09-13 PROCEDURE — 93010 EKG 12-LEAD: ICD-10-PCS | Mod: ,,, | Performed by: INTERNAL MEDICINE

## 2020-09-13 PROCEDURE — 93010 ELECTROCARDIOGRAM REPORT: CPT | Mod: ,,, | Performed by: INTERNAL MEDICINE

## 2020-09-13 PROCEDURE — 25000003 PHARM REV CODE 250: Performed by: NURSE PRACTITIONER

## 2020-09-13 PROCEDURE — S4991 NICOTINE PATCH NONLEGEND: HCPCS | Performed by: NURSE PRACTITIONER

## 2020-09-13 PROCEDURE — 96376 TX/PRO/DX INJ SAME DRUG ADON: CPT

## 2020-09-13 PROCEDURE — 63600175 PHARM REV CODE 636 W HCPCS: Performed by: FAMILY MEDICINE

## 2020-09-13 PROCEDURE — 63600175 PHARM REV CODE 636 W HCPCS: Performed by: NURSE PRACTITIONER

## 2020-09-13 PROCEDURE — 93005 ELECTROCARDIOGRAM TRACING: CPT

## 2020-09-13 PROCEDURE — 36415 COLL VENOUS BLD VENIPUNCTURE: CPT

## 2020-09-13 PROCEDURE — 85025 COMPLETE CBC W/AUTO DIFF WBC: CPT

## 2020-09-13 PROCEDURE — G0378 HOSPITAL OBSERVATION PER HR: HCPCS

## 2020-09-13 PROCEDURE — 80053 COMPREHEN METABOLIC PANEL: CPT

## 2020-09-13 PROCEDURE — 25000003 PHARM REV CODE 250: Performed by: FAMILY MEDICINE

## 2020-09-13 PROCEDURE — 96366 THER/PROPH/DIAG IV INF ADDON: CPT

## 2020-09-13 RX ORDER — POTASSIUM CHLORIDE 20 MEQ/1
40 TABLET, EXTENDED RELEASE ORAL ONCE
Status: COMPLETED | OUTPATIENT
Start: 2020-09-13 | End: 2020-09-13

## 2020-09-13 RX ORDER — CHLORDIAZEPOXIDE HYDROCHLORIDE 25 MG/1
CAPSULE, GELATIN COATED ORAL
Qty: 18 CAPSULE | Refills: 0 | Status: ON HOLD | OUTPATIENT
Start: 2020-09-13 | End: 2021-06-22 | Stop reason: HOSPADM

## 2020-09-13 RX ADMIN — POTASSIUM CHLORIDE 40 MEQ: 1500 TABLET, EXTENDED RELEASE ORAL at 09:09

## 2020-09-13 RX ADMIN — FOLIC ACID: 5 INJECTION, SOLUTION INTRAMUSCULAR; INTRAVENOUS; SUBCUTANEOUS at 09:09

## 2020-09-13 RX ADMIN — LORAZEPAM 2 MG: 2 INJECTION INTRAMUSCULAR; INTRAVENOUS at 05:09

## 2020-09-13 RX ADMIN — Medication 1 PATCH: at 09:09

## 2020-09-13 RX ADMIN — CHLORDIAZEPOXIDE HYDROCHLORIDE 25 MG: 25 CAPSULE ORAL at 09:09

## 2020-09-13 NOTE — NURSING
Patient given AVS and reviewed with patient. PIVs removed with catheters intact. Heart monitor 8623 removed and returned. Patient with belongings. Patient's son to provide transportation home

## 2020-09-13 NOTE — HOSPITAL COURSE
Patient was admitted for epigastric pain, intractable vomiting, and acute intoxication. Patient was started on DT protocol and monitored overnight. Reported wanting to quit. Case management informed to give information on AA and rehab. Patient reported feeling better the next day and was dc home with librium taper.

## 2020-09-13 NOTE — PLAN OF CARE
Patient's plan of care is met adequately for discharge. Patient awaiting information from [facility] for alcohol cessation education.

## 2020-09-13 NOTE — NURSING
Patient discharge pending information from LCSW. Prescription hard copy given to patient and placed in personal belongings.

## 2020-09-13 NOTE — PLAN OF CARE
Pt remains fall free this shift.  Pt AAOx4, verbal, clear speech.  Skin warm and dry. No new skin issues.  Telemonitoring in progress, 50s-70s SR  Room air  Up to bathroom.  Standby assist with transfers.  Bed low, side rails up x 2, wheels locked, call light in reach.   Bed alarm maintained for safety.   Patient instructed to call for assistance.   Hourly rounding completed.   24 hour chart check completed  POC updated and reviewed with pt. Will continue POC.

## 2020-09-13 NOTE — PLAN OF CARE
Patient admitted to telemetry. Patient orientation provided. Bilateral IVs started. Banana bag started. Ativan given for tremors and to prevent D/T. Educated patient on fall risk.

## 2020-09-13 NOTE — DISCHARGE SUMMARY
[facility] Hospital Medicine  Discharge Summary      Patient Name: Barb Carson  MRN: [id number]  Admission Date: 9/11/2020  Hospital Length of Stay: 0 days  Discharge Date and Time: 9/13/2020  2:07 PM  Attending Physician: No att. providers found   Discharging Provider: Piero Talbert MD  Primary Care Provider: Tony Paul DO      HPI:   Mr Carson is a 64 year old male with PMHx of ETOH abuse, tobacco abuse, anxiety and depression who presented to [facility] with conplaints of nausea, vomiting and tremors. Symptoms worsen on yesterday. Transferred to [facility] for further evaluation and treatment. Associated symptoms include abdominal pain. Denies associated symptoms of chest pain, shortness of breath, fever, chills or diaphoresis. ETOH level 84 on admission. He continues to experiencing nausea, vomiting and tremors. Patient is a full code, brother is surrogate decision maker. He is being placed in Observation under the care of Hospital Medicine.     * No surgery found *      Hospital Course:   Patient was admitted for epigastric pain, intractable vomiting, and acute intoxication. Patient was started on DT protocol and monitored overnight. Reported wanting to quit. Case management informed to give information on AA and rehab. Patient reported feeling better the next day and was dc home with librium taper.      Consults:     Tobacco abuse  1 ppd smoker   Encouraged smoking cessation   Nicotine patch           Final Active Diagnoses:    Diagnosis Date Noted POA    Elevated LFTs [R94.5] 09/12/2020 Yes    Anxiety [F41.9] 05/10/2018 Yes    Emesis [R11.10] 09/25/2017 Yes    Tobacco abuse [Z72.0] 12/04/2015 Yes      Problems Resolved During this Admission:    Diagnosis Date Noted Date Resolved POA    PRINCIPAL PROBLEM:  Acute alcoholic intoxication in alcoholism with complication [F10.229] 09/12/2020 09/13/2020 Yes       Discharged Condition: good    Disposition: Home or Self Care    Follow  Up:  Follow-up Information     Tony Paul,  In 2 weeks.    Specialty: Family Medicine  Contact information:  [address] [address] [address] [phone]                 Patient Instructions:   No discharge procedures on file.    Significant Diagnostic Studies: Labs:   BMP:   Recent Labs   Lab 09/11/20 2205 09/12/20 0518 09/13/20  0709   * 131* 62*    140 140   K 3.4* 4.0 3.1*    99 99   CO2 19* 18* 30*   BUN 8 11 10   CREATININE 0.9 0.8 0.8   CALCIUM 8.7 8.5* 8.7   MG 1.4*  --   --     and CBC   Recent Labs   Lab 09/11/20 2205 09/12/20 0518 09/13/20  0709   WBC 4.07 5.84 3.74*   HGB 16.3 14.5 13.9*   HCT 45.7 40.4 40.7   * 100* 74*       Pending Diagnostic Studies:     None         Medications:  Reconciled Home Medications:      Medication List      CHANGE how you take these medications    chlordiazepoxide 25 MG Cap  Commonly known as: LIBRIUM  Take 1 capsule (25 mg total) by mouth 3 (three) times daily for 3 days, THEN 1 capsule (25 mg total) 2 (two) times a day for 3 days, THEN 1 capsule (25 mg total) once daily for 3 days.  Start taking on: September 13, 2020  What changed:   · medication strength  · See the new instructions.        CONTINUE taking these medications    buPROPion 300 MG 24 hr tablet  Commonly known as: WELLBUTRIN XL  Take 1 tablet (300 mg total) by mouth once daily.     cetirizine 10 MG tablet  Commonly known as: ZYRTEC  Take 1 tablet (10 mg total) by mouth once daily.     escitalopram oxalate 10 MG tablet  Commonly known as: LEXAPRO  Take 10 mg by mouth once daily.     fluticasone propionate 50 mcg/actuation nasal spray  Commonly known as: FLONASE  2 sprays (100 mcg total) by Each Nostril route once daily.     folic acid 1 MG tablet  Commonly known as: FOLVITE  TAKE ONE TABLET BY MOUTH AS NEEDED AS DIRECTED     ibuprofen 200 MG tablet  Commonly known as: ADVIL,MOTRIN  Take 200 mg by mouth.     multivitamin capsule  Take 1 capsule by mouth once daily.      naltrexone 50 mg tablet  Commonly known as: DEPADE  Take 50 mg by mouth nightly. at bedtime.     ondansetron 4 MG tablet  Commonly known as: ZOFRAN  Take 1 tablet (4 mg total) by mouth every 8 (eight) hours as needed.     ondansetron 4 MG Tbdl  Commonly known as: ZOFRAN-ODT  Take 1 tablet (4 mg total) by mouth every 6 (six) hours as needed (NAUSEA/VOMITING).     promethazine 25 MG tablet  Commonly known as: PHENERGAN  Take 1 tablet (25 mg total) by mouth every 6 (six) hours as needed for Nausea.        STOP taking these medications    LORazepam 1 MG tablet  Commonly known as: ATIVAN            Indwelling Lines/Drains at time of discharge:   Lines/Drains/Airways     None                 Time spent on the discharge of patient: 35 minutes  Patient was seen and examined on the date of discharge and determined to be suitable for discharge.         Piero Talbert MD  Department of Hospital Medicine  [facility]

## 2021-02-24 ENCOUNTER — OFFICE VISIT (OUTPATIENT)
Dept: HEPATOLOGY | Facility: CLINIC | Age: 66
End: 2021-02-24
Payer: COMMERCIAL

## 2021-02-24 ENCOUNTER — LAB VISIT (OUTPATIENT)
Dept: LAB | Facility: HOSPITAL | Age: 66
End: 2021-02-24
Attending: INTERNAL MEDICINE
Payer: COMMERCIAL

## 2021-02-24 VITALS
WEIGHT: 184.94 LBS | DIASTOLIC BLOOD PRESSURE: 60 MMHG | HEIGHT: 69 IN | SYSTOLIC BLOOD PRESSURE: 112 MMHG | BODY MASS INDEX: 27.39 KG/M2

## 2021-02-24 DIAGNOSIS — R11.2 NAUSEA AND VOMITING, INTRACTABILITY OF VOMITING NOT SPECIFIED, UNSPECIFIED VOMITING TYPE: ICD-10-CM

## 2021-02-24 DIAGNOSIS — R79.89 ABNORMAL LFTS: ICD-10-CM

## 2021-02-24 DIAGNOSIS — K76.0 FATTY LIVER: ICD-10-CM

## 2021-02-24 DIAGNOSIS — K70.9 ALCOHOLIC LIVER DISEASE: Primary | ICD-10-CM

## 2021-02-24 LAB
ALBUMIN SERPL BCP-MCNC: 4 G/DL (ref 3.5–5.2)
ALP SERPL-CCNC: 83 U/L (ref 55–135)
ALT SERPL W/O P-5'-P-CCNC: 43 U/L (ref 10–44)
ANION GAP SERPL CALC-SCNC: 9 MMOL/L (ref 8–16)
AST SERPL-CCNC: 32 U/L (ref 10–40)
BASOPHILS # BLD AUTO: 0.04 K/UL (ref 0–0.2)
BASOPHILS NFR BLD: 0.7 % (ref 0–1.9)
BILIRUB DIRECT SERPL-MCNC: 0.2 MG/DL (ref 0.1–0.3)
BILIRUB SERPL-MCNC: 0.4 MG/DL (ref 0.1–1)
BUN SERPL-MCNC: 15 MG/DL (ref 8–23)
CALCIUM SERPL-MCNC: 8.9 MG/DL (ref 8.7–10.5)
CHLORIDE SERPL-SCNC: 104 MMOL/L (ref 95–110)
CO2 SERPL-SCNC: 26 MMOL/L (ref 23–29)
CREAT SERPL-MCNC: 1 MG/DL (ref 0.5–1.4)
DIFFERENTIAL METHOD: ABNORMAL
EOSINOPHIL # BLD AUTO: 0.1 K/UL (ref 0–0.5)
EOSINOPHIL NFR BLD: 2.6 % (ref 0–8)
ERYTHROCYTE [DISTWIDTH] IN BLOOD BY AUTOMATED COUNT: 13.2 % (ref 11.5–14.5)
EST. GFR  (AFRICAN AMERICAN): >60 ML/MIN/1.73 M^2
EST. GFR  (NON AFRICAN AMERICAN): >60 ML/MIN/1.73 M^2
GLUCOSE SERPL-MCNC: 90 MG/DL (ref 70–110)
HCT VFR BLD AUTO: 35.4 % (ref 40–54)
HGB BLD-MCNC: 12.1 G/DL (ref 14–18)
IMM GRANULOCYTES # BLD AUTO: 0.02 K/UL (ref 0–0.04)
IMM GRANULOCYTES NFR BLD AUTO: 0.4 % (ref 0–0.5)
LYMPHOCYTES # BLD AUTO: 1.8 K/UL (ref 1–4.8)
LYMPHOCYTES NFR BLD: 32.9 % (ref 18–48)
MCH RBC QN AUTO: 33 PG (ref 27–31)
MCHC RBC AUTO-ENTMCNC: 34.2 G/DL (ref 32–36)
MCV RBC AUTO: 97 FL (ref 82–98)
MONOCYTES # BLD AUTO: 0.4 K/UL (ref 0.3–1)
MONOCYTES NFR BLD: 7.4 % (ref 4–15)
NEUTROPHILS # BLD AUTO: 3 K/UL (ref 1.8–7.7)
NEUTROPHILS NFR BLD: 56 % (ref 38–73)
NRBC BLD-RTO: 0 /100 WBC
PLATELET # BLD AUTO: 208 K/UL (ref 150–350)
PMV BLD AUTO: 8.5 FL (ref 9.2–12.9)
POTASSIUM SERPL-SCNC: 4.3 MMOL/L (ref 3.5–5.1)
PROT SERPL-MCNC: 6.8 G/DL (ref 6–8.4)
RBC # BLD AUTO: 3.67 M/UL (ref 4.6–6.2)
SODIUM SERPL-SCNC: 139 MMOL/L (ref 136–145)
WBC # BLD AUTO: 5.41 K/UL (ref 3.9–12.7)

## 2021-02-24 PROCEDURE — 99999 PR PBB SHADOW E&M-EST. PATIENT-LVL III: ICD-10-PCS | Mod: PBBFAC,,, | Performed by: INTERNAL MEDICINE

## 2021-02-24 PROCEDURE — 36415 COLL VENOUS BLD VENIPUNCTURE: CPT

## 2021-02-24 PROCEDURE — 3008F BODY MASS INDEX DOCD: CPT | Mod: CPTII,S$GLB,, | Performed by: INTERNAL MEDICINE

## 2021-02-24 PROCEDURE — 99213 PR OFFICE/OUTPT VISIT, EST, LEVL III, 20-29 MIN: ICD-10-PCS | Mod: S$GLB,,, | Performed by: INTERNAL MEDICINE

## 2021-02-24 PROCEDURE — 3008F PR BODY MASS INDEX (BMI) DOCUMENTED: ICD-10-PCS | Mod: CPTII,S$GLB,, | Performed by: INTERNAL MEDICINE

## 2021-02-24 PROCEDURE — 99999 PR PBB SHADOW E&M-EST. PATIENT-LVL III: CPT | Mod: PBBFAC,,, | Performed by: INTERNAL MEDICINE

## 2021-02-24 PROCEDURE — 1126F PR PAIN SEVERITY QUANTIFIED, NO PAIN PRESENT: ICD-10-PCS | Mod: S$GLB,,, | Performed by: INTERNAL MEDICINE

## 2021-02-24 PROCEDURE — 80076 HEPATIC FUNCTION PANEL: CPT

## 2021-02-24 PROCEDURE — 99213 OFFICE O/P EST LOW 20 MIN: CPT | Mod: S$GLB,,, | Performed by: INTERNAL MEDICINE

## 2021-02-24 PROCEDURE — 85025 COMPLETE CBC W/AUTO DIFF WBC: CPT

## 2021-02-24 PROCEDURE — 1126F AMNT PAIN NOTED NONE PRSNT: CPT | Mod: S$GLB,,, | Performed by: INTERNAL MEDICINE

## 2021-02-24 PROCEDURE — 82977 ASSAY OF GGT: CPT

## 2021-02-24 PROCEDURE — 80048 BASIC METABOLIC PNL TOTAL CA: CPT

## 2021-02-25 LAB — GGT SERPL-CCNC: 41 U/L (ref 8–55)

## 2021-06-20 ENCOUNTER — HOSPITAL ENCOUNTER (INPATIENT)
Facility: HOSPITAL | Age: 66
LOS: 2 days | Discharge: HOME | DRG: 309 | End: 2021-06-22
Attending: EMERGENCY MEDICINE | Admitting: INTERNAL MEDICINE
Payer: COMMERCIAL

## 2021-06-20 DIAGNOSIS — I48.91 A-FIB: ICD-10-CM

## 2021-06-20 DIAGNOSIS — F10.939 ALCOHOL WITHDRAWAL: ICD-10-CM

## 2021-06-20 DIAGNOSIS — E87.20 LACTIC ACIDOSIS: ICD-10-CM

## 2021-06-20 DIAGNOSIS — F10.929 ALCOHOLIC INTOXICATION WITH COMPLICATION: Primary | ICD-10-CM

## 2021-06-20 DIAGNOSIS — Z72.0 TOBACCO ABUSE: ICD-10-CM

## 2021-06-20 DIAGNOSIS — I48.91 ATRIAL FIBRILLATION, RAPID: ICD-10-CM

## 2021-06-20 DIAGNOSIS — F10.20 ALCOHOLISM: ICD-10-CM

## 2021-06-20 LAB
ALBUMIN SERPL BCP-MCNC: 4.7 G/DL (ref 3.5–5.2)
ALP SERPL-CCNC: 106 U/L (ref 55–135)
ALT SERPL W/O P-5'-P-CCNC: 159 U/L (ref 10–44)
AMMONIA PLAS-SCNC: 35 UMOL/L (ref 10–50)
AMPHET+METHAMPHET UR QL: NEGATIVE
ANION GAP SERPL CALC-SCNC: 24 MMOL/L (ref 8–16)
AST SERPL-CCNC: 172 U/L (ref 10–40)
BACTERIA #/AREA URNS AUTO: ABNORMAL /HPF
BARBITURATES UR QL SCN>200 NG/ML: NEGATIVE
BASOPHILS # BLD AUTO: 0.04 K/UL (ref 0–0.2)
BASOPHILS NFR BLD: 0.5 % (ref 0–1.9)
BENZODIAZ UR QL SCN>200 NG/ML: NEGATIVE
BILIRUB SERPL-MCNC: 1.5 MG/DL (ref 0.1–1)
BILIRUB UR QL STRIP: NEGATIVE
BNP SERPL-MCNC: 51 PG/ML (ref 0–99)
BUN SERPL-MCNC: 12 MG/DL (ref 8–23)
BZE UR QL SCN: NEGATIVE
CALCIUM SERPL-MCNC: 9.1 MG/DL (ref 8.7–10.5)
CANNABINOIDS UR QL SCN: NEGATIVE
CHLORIDE SERPL-SCNC: 94 MMOL/L (ref 95–110)
CK MB SERPL-MCNC: 7.8 NG/ML (ref 0.1–6.5)
CK MB SERPL-RTO: 1.2 % (ref 0–5)
CK SERPL-CCNC: 658 U/L (ref 20–200)
CK SERPL-CCNC: 658 U/L (ref 20–200)
CLARITY UR REFRACT.AUTO: CLEAR
CO2 SERPL-SCNC: 18 MMOL/L (ref 23–29)
COLOR UR AUTO: YELLOW
CREAT SERPL-MCNC: 1 MG/DL (ref 0.5–1.4)
CREAT UR-MCNC: 168 MG/DL (ref 23–375)
DIFFERENTIAL METHOD: ABNORMAL
EOSINOPHIL # BLD AUTO: 0 K/UL (ref 0–0.5)
EOSINOPHIL NFR BLD: 0 % (ref 0–8)
ERYTHROCYTE [DISTWIDTH] IN BLOOD BY AUTOMATED COUNT: 12.5 % (ref 11.5–14.5)
EST. GFR  (AFRICAN AMERICAN): >60 ML/MIN/1.73 M^2
EST. GFR  (NON AFRICAN AMERICAN): >60 ML/MIN/1.73 M^2
ETHANOL SERPL-MCNC: 150 MG/DL
ETHANOL SERPL-MCNC: 231 MG/DL
GLUCOSE SERPL-MCNC: 156 MG/DL (ref 70–110)
GLUCOSE UR QL STRIP: NEGATIVE
HCT VFR BLD AUTO: 43.8 % (ref 40–54)
HGB BLD-MCNC: 16.3 G/DL (ref 14–18)
HGB UR QL STRIP: ABNORMAL
HYALINE CASTS UR QL AUTO: 5 /LPF
IMM GRANULOCYTES # BLD AUTO: 0.06 K/UL (ref 0–0.04)
IMM GRANULOCYTES NFR BLD AUTO: 0.7 % (ref 0–0.5)
INR PPP: 1.1 (ref 0.8–1.2)
KETONES UR QL STRIP: ABNORMAL
LACTATE SERPL-SCNC: 6.8 MMOL/L (ref 0.5–2.2)
LACTATE SERPL-SCNC: 8.2 MMOL/L (ref 0.5–2.2)
LEUKOCYTE ESTERASE UR QL STRIP: NEGATIVE
LIPASE SERPL-CCNC: 60 U/L (ref 4–60)
LYMPHOCYTES # BLD AUTO: 1.2 K/UL (ref 1–4.8)
LYMPHOCYTES NFR BLD: 13.2 % (ref 18–48)
MAGNESIUM SERPL-MCNC: 1.7 MG/DL (ref 1.6–2.6)
MCH RBC QN AUTO: 31.5 PG (ref 27–31)
MCHC RBC AUTO-ENTMCNC: 37.2 G/DL (ref 32–36)
MCV RBC AUTO: 85 FL (ref 82–98)
METHADONE UR QL SCN>300 NG/ML: NEGATIVE
MICROSCOPIC COMMENT: ABNORMAL
MONOCYTES # BLD AUTO: 0.5 K/UL (ref 0.3–1)
MONOCYTES NFR BLD: 5.9 % (ref 4–15)
NEUTROPHILS # BLD AUTO: 7 K/UL (ref 1.8–7.7)
NEUTROPHILS NFR BLD: 79.7 % (ref 38–73)
NITRITE UR QL STRIP: NEGATIVE
NRBC BLD-RTO: 0 /100 WBC
OPIATES UR QL SCN: NEGATIVE
OTHER ELEMENTS URNS MICRO: ABNORMAL
PCP UR QL SCN>25 NG/ML: NEGATIVE
PH UR STRIP: 6 [PH] (ref 5–8)
PLATELET # BLD AUTO: 186 K/UL (ref 150–450)
PMV BLD AUTO: 8.8 FL (ref 9.2–12.9)
POTASSIUM SERPL-SCNC: 3.7 MMOL/L (ref 3.5–5.1)
PROT SERPL-MCNC: 8 G/DL (ref 6–8.4)
PROT UR QL STRIP: ABNORMAL
PROTHROMBIN TIME: 12 SEC (ref 9–12.5)
RBC # BLD AUTO: 5.18 M/UL (ref 4.6–6.2)
RBC #/AREA URNS AUTO: 1 /HPF (ref 0–4)
SARS-COV-2 RDRP RESP QL NAA+PROBE: NEGATIVE
SODIUM SERPL-SCNC: 136 MMOL/L (ref 136–145)
SP GR UR STRIP: 1.02 (ref 1–1.03)
TOXICOLOGY INFORMATION: NORMAL
TROPONIN I SERPL DL<=0.01 NG/ML-MCNC: 0.01 NG/ML (ref 0–0.03)
URN SPEC COLLECT METH UR: ABNORMAL
UROBILINOGEN UR STRIP-ACNC: NEGATIVE EU/DL
WBC # BLD AUTO: 8.79 K/UL (ref 3.9–12.7)
WBC #/AREA URNS AUTO: 0 /HPF (ref 0–5)

## 2021-06-20 PROCEDURE — 93010 ELECTROCARDIOGRAM REPORT: CPT | Mod: ,,, | Performed by: INTERNAL MEDICINE

## 2021-06-20 PROCEDURE — 25000003 PHARM REV CODE 250: Mod: ER | Performed by: EMERGENCY MEDICINE

## 2021-06-20 PROCEDURE — 82077 ASSAY SPEC XCP UR&BREATH IA: CPT | Mod: ER | Performed by: EMERGENCY MEDICINE

## 2021-06-20 PROCEDURE — 96361 HYDRATE IV INFUSION ADD-ON: CPT | Mod: 59,ER

## 2021-06-20 PROCEDURE — 96367 TX/PROPH/DG ADDL SEQ IV INF: CPT | Mod: ER

## 2021-06-20 PROCEDURE — 86703 HIV-1/HIV-2 1 RESULT ANTBDY: CPT | Performed by: EMERGENCY MEDICINE

## 2021-06-20 PROCEDURE — 80307 DRUG TEST PRSMV CHEM ANLYZR: CPT | Mod: ER | Performed by: EMERGENCY MEDICINE

## 2021-06-20 PROCEDURE — 93010 EKG 12-LEAD: ICD-10-PCS | Mod: ,,, | Performed by: INTERNAL MEDICINE

## 2021-06-20 PROCEDURE — 96366 THER/PROPH/DIAG IV INF ADDON: CPT | Mod: ER

## 2021-06-20 PROCEDURE — 83735 ASSAY OF MAGNESIUM: CPT | Mod: ER | Performed by: EMERGENCY MEDICINE

## 2021-06-20 PROCEDURE — 85025 COMPLETE CBC W/AUTO DIFF WBC: CPT | Mod: ER | Performed by: EMERGENCY MEDICINE

## 2021-06-20 PROCEDURE — 87040 BLOOD CULTURE FOR BACTERIA: CPT | Performed by: EMERGENCY MEDICINE

## 2021-06-20 PROCEDURE — 21400001 HC TELEMETRY ROOM

## 2021-06-20 PROCEDURE — 85610 PROTHROMBIN TIME: CPT | Mod: ER | Performed by: EMERGENCY MEDICINE

## 2021-06-20 PROCEDURE — 81000 URINALYSIS NONAUTO W/SCOPE: CPT | Mod: ER | Performed by: EMERGENCY MEDICINE

## 2021-06-20 PROCEDURE — 99291 CRITICAL CARE FIRST HOUR: CPT | Mod: 25,ER

## 2021-06-20 PROCEDURE — 82550 ASSAY OF CK (CPK): CPT | Mod: ER | Performed by: EMERGENCY MEDICINE

## 2021-06-20 PROCEDURE — 82077 ASSAY SPEC XCP UR&BREATH IA: CPT | Mod: 91,ER | Performed by: EMERGENCY MEDICINE

## 2021-06-20 PROCEDURE — 83880 ASSAY OF NATRIURETIC PEPTIDE: CPT | Mod: ER | Performed by: EMERGENCY MEDICINE

## 2021-06-20 PROCEDURE — 96375 TX/PRO/DX INJ NEW DRUG ADDON: CPT | Mod: ER

## 2021-06-20 PROCEDURE — 83605 ASSAY OF LACTIC ACID: CPT | Mod: ER | Performed by: EMERGENCY MEDICINE

## 2021-06-20 PROCEDURE — 93005 ELECTROCARDIOGRAM TRACING: CPT | Mod: ER

## 2021-06-20 PROCEDURE — 80053 COMPREHEN METABOLIC PANEL: CPT | Mod: ER | Performed by: EMERGENCY MEDICINE

## 2021-06-20 PROCEDURE — 84484 ASSAY OF TROPONIN QUANT: CPT | Mod: ER | Performed by: EMERGENCY MEDICINE

## 2021-06-20 PROCEDURE — 86803 HEPATITIS C AB TEST: CPT | Performed by: EMERGENCY MEDICINE

## 2021-06-20 PROCEDURE — U0002 COVID-19 LAB TEST NON-CDC: HCPCS | Mod: ER | Performed by: EMERGENCY MEDICINE

## 2021-06-20 PROCEDURE — 83690 ASSAY OF LIPASE: CPT | Mod: ER | Performed by: EMERGENCY MEDICINE

## 2021-06-20 PROCEDURE — 83605 ASSAY OF LACTIC ACID: CPT | Mod: 91,ER | Performed by: EMERGENCY MEDICINE

## 2021-06-20 PROCEDURE — 63600175 PHARM REV CODE 636 W HCPCS: Mod: ER | Performed by: EMERGENCY MEDICINE

## 2021-06-20 PROCEDURE — 96365 THER/PROPH/DIAG IV INF INIT: CPT | Mod: ER

## 2021-06-20 PROCEDURE — 27000221 HC OXYGEN, UP TO 24 HOURS: Mod: ER

## 2021-06-20 PROCEDURE — 82140 ASSAY OF AMMONIA: CPT | Mod: ER | Performed by: EMERGENCY MEDICINE

## 2021-06-20 PROCEDURE — 96376 TX/PRO/DX INJ SAME DRUG ADON: CPT | Mod: ER

## 2021-06-20 PROCEDURE — 96360 HYDRATION IV INFUSION INIT: CPT | Mod: 59,ER

## 2021-06-20 RX ORDER — ONDANSETRON 2 MG/ML
4 INJECTION INTRAMUSCULAR; INTRAVENOUS
Status: COMPLETED | OUTPATIENT
Start: 2021-06-20 | End: 2021-06-20

## 2021-06-20 RX ORDER — ASPIRIN 325 MG
325 TABLET ORAL
Status: COMPLETED | OUTPATIENT
Start: 2021-06-20 | End: 2021-06-20

## 2021-06-20 RX ORDER — ONDANSETRON 2 MG/ML
4 INJECTION INTRAMUSCULAR; INTRAVENOUS EVERY 8 HOURS PRN
Status: DISCONTINUED | OUTPATIENT
Start: 2021-06-20 | End: 2021-06-22 | Stop reason: HOSPADM

## 2021-06-20 RX ORDER — HYDRALAZINE HYDROCHLORIDE 20 MG/ML
10 INJECTION INTRAMUSCULAR; INTRAVENOUS EVERY 6 HOURS PRN
Status: DISCONTINUED | OUTPATIENT
Start: 2021-06-20 | End: 2021-06-22 | Stop reason: HOSPADM

## 2021-06-20 RX ORDER — ASPIRIN 325 MG
325 TABLET, DELAYED RELEASE (ENTERIC COATED) ORAL
Status: DISCONTINUED | OUTPATIENT
Start: 2021-06-20 | End: 2021-06-20

## 2021-06-20 RX ORDER — GUAIFENESIN 100 MG/5ML
200 SOLUTION ORAL EVERY 4 HOURS PRN
Status: DISCONTINUED | OUTPATIENT
Start: 2021-06-20 | End: 2021-06-22 | Stop reason: HOSPADM

## 2021-06-20 RX ORDER — DILTIAZEM HYDROCHLORIDE 5 MG/ML
10 INJECTION INTRAVENOUS
Status: COMPLETED | OUTPATIENT
Start: 2021-06-20 | End: 2021-06-20

## 2021-06-20 RX ORDER — MAGNESIUM SULFATE 1 G/100ML
2 INJECTION INTRAVENOUS ONCE
Status: COMPLETED | OUTPATIENT
Start: 2021-06-21 | End: 2021-06-21

## 2021-06-20 RX ORDER — DILTIAZEM HYDROCHLORIDE 5 MG/ML
20 INJECTION INTRAVENOUS
Status: COMPLETED | OUTPATIENT
Start: 2021-06-20 | End: 2021-06-20

## 2021-06-20 RX ORDER — SODIUM CHLORIDE 9 MG/ML
INJECTION, SOLUTION INTRAVENOUS CONTINUOUS
Status: ACTIVE | OUTPATIENT
Start: 2021-06-21 | End: 2021-06-21

## 2021-06-20 RX ORDER — MAG HYDROX/ALUMINUM HYD/SIMETH 200-200-20
30 SUSPENSION, ORAL (FINAL DOSE FORM) ORAL EVERY 6 HOURS PRN
Status: DISCONTINUED | OUTPATIENT
Start: 2021-06-20 | End: 2021-06-22 | Stop reason: HOSPADM

## 2021-06-20 RX ORDER — IPRATROPIUM BROMIDE AND ALBUTEROL SULFATE 2.5; .5 MG/3ML; MG/3ML
3 SOLUTION RESPIRATORY (INHALATION) EVERY 4 HOURS PRN
Status: DISCONTINUED | OUTPATIENT
Start: 2021-06-20 | End: 2021-06-22 | Stop reason: HOSPADM

## 2021-06-20 RX ORDER — CHLORDIAZEPOXIDE HYDROCHLORIDE 25 MG/1
25 CAPSULE, GELATIN COATED ORAL 3 TIMES DAILY
Status: DISCONTINUED | OUTPATIENT
Start: 2021-06-21 | End: 2021-06-22 | Stop reason: HOSPADM

## 2021-06-20 RX ORDER — DILTIAZEM HCL/D5W 125 MG/125
15 PLASTIC BAG, INJECTION (ML) INTRAVENOUS CONTINUOUS
Status: DISCONTINUED | OUTPATIENT
Start: 2021-06-20 | End: 2021-06-21

## 2021-06-20 RX ORDER — DILTIAZEM HCL/D5W 125 MG/125
10 PLASTIC BAG, INJECTION (ML) INTRAVENOUS CONTINUOUS
Status: DISCONTINUED | OUTPATIENT
Start: 2021-06-20 | End: 2021-06-21

## 2021-06-20 RX ORDER — DIPHENHYDRAMINE HCL 25 MG
25 CAPSULE ORAL EVERY 6 HOURS PRN
Status: DISCONTINUED | OUTPATIENT
Start: 2021-06-20 | End: 2021-06-22 | Stop reason: HOSPADM

## 2021-06-20 RX ADMIN — Medication 10 MG/HR: at 05:06

## 2021-06-20 RX ADMIN — ASPIRIN 325 MG: 325 TABLET ORAL at 06:06

## 2021-06-20 RX ADMIN — ASCORBIC ACID, VITAMIN A PALMITATE, CHOLECALCIFEROL, THIAMINE HYDROCHLORIDE, RIBOFLAVIN-5 PHOSPHATE SODIUM, PYRIDOXINE HYDROCHLORIDE, NIACINAMIDE, DEXPANTHENOL, ALPHA-TOCOPHEROL ACETATE, VITAMIN K1, FOLIC ACID, BIOTIN, CYANOCOBALAMIN: 200; 3300; 200; 6; 3.6; 6; 40; 15; 10; 150; 600; 60; 5 INJECTION, SOLUTION INTRAVENOUS at 05:06

## 2021-06-20 RX ADMIN — SODIUM CHLORIDE, SODIUM LACTATE, POTASSIUM CHLORIDE, AND CALCIUM CHLORIDE 1000 ML: .6; .31; .03; .02 INJECTION, SOLUTION INTRAVENOUS at 06:06

## 2021-06-20 RX ADMIN — Medication 15 MG/HR: at 07:06

## 2021-06-20 RX ADMIN — THIAMINE HYDROCHLORIDE 100 MG: 100 INJECTION, SOLUTION INTRAMUSCULAR; INTRAVENOUS at 05:06

## 2021-06-20 RX ADMIN — DILTIAZEM HYDROCHLORIDE 20 MG: 5 INJECTION INTRAVENOUS at 07:06

## 2021-06-20 RX ADMIN — LORAZEPAM 1 MG: 2 INJECTION INTRAMUSCULAR; INTRAVENOUS at 05:06

## 2021-06-20 RX ADMIN — CEFTRIAXONE 1 G: 1 INJECTION, SOLUTION INTRAVENOUS at 07:06

## 2021-06-20 RX ADMIN — ONDANSETRON 4 MG: 2 INJECTION INTRAMUSCULAR; INTRAVENOUS at 07:06

## 2021-06-20 RX ADMIN — DILTIAZEM HYDROCHLORIDE 10 MG: 5 INJECTION INTRAVENOUS at 05:06

## 2021-06-20 RX ADMIN — LORAZEPAM 2 MG: 2 INJECTION INTRAMUSCULAR; INTRAVENOUS at 07:06

## 2021-06-20 RX ADMIN — ONDANSETRON 4 MG: 2 INJECTION INTRAMUSCULAR; INTRAVENOUS at 05:06

## 2021-06-21 PROBLEM — I48.91 ATRIAL FIBRILLATION WITH RVR: Status: ACTIVE | Noted: 2021-06-21

## 2021-06-21 PROBLEM — E87.29 HIGH ANION GAP METABOLIC ACIDOSIS: Status: ACTIVE | Noted: 2017-04-24

## 2021-06-21 PROBLEM — M62.82 NON-TRAUMATIC RHABDOMYOLYSIS: Status: ACTIVE | Noted: 2021-06-21

## 2021-06-21 PROBLEM — R74.01 TRANSAMINITIS: Status: ACTIVE | Noted: 2021-06-21

## 2021-06-21 LAB
ALBUMIN SERPL BCP-MCNC: 3.8 G/DL (ref 3.5–5.2)
ALP SERPL-CCNC: 82 U/L (ref 55–135)
ALT SERPL W/O P-5'-P-CCNC: 119 U/L (ref 10–44)
ANION GAP SERPL CALC-SCNC: 14 MMOL/L (ref 8–16)
AORTIC ROOT ANNULUS: 3.24 CM
APTT BLDCRRT: 24.8 SEC (ref 21–32)
ASCENDING AORTA: 2.98 CM
AST SERPL-CCNC: 113 U/L (ref 10–40)
AV INDEX (PROSTH): 0.94
AV MEAN GRADIENT: 6 MMHG
AV PEAK GRADIENT: 11 MMHG
AV VALVE AREA: 2.96 CM2
AV VELOCITY RATIO: 0.74
BASOPHILS # BLD AUTO: 0.01 K/UL (ref 0–0.2)
BASOPHILS NFR BLD: 0.1 % (ref 0–1.9)
BILIRUB SERPL-MCNC: 1.8 MG/DL (ref 0.1–1)
BSA FOR ECHO PROCEDURE: 1.99 M2
BUN SERPL-MCNC: 13 MG/DL (ref 8–23)
CALCIUM SERPL-MCNC: 8.5 MG/DL (ref 8.7–10.5)
CHLORIDE SERPL-SCNC: 96 MMOL/L (ref 95–110)
CK SERPL-CCNC: 521 U/L (ref 20–200)
CO2 SERPL-SCNC: 27 MMOL/L (ref 23–29)
CREAT SERPL-MCNC: 0.8 MG/DL (ref 0.5–1.4)
CV ECHO LV RWT: 0.38 CM
DIFFERENTIAL METHOD: ABNORMAL
DOP CALC AO PEAK VEL: 1.68 M/S
DOP CALC AO VTI: 27.96 CM
DOP CALC LVOT AREA: 3.1 CM2
DOP CALC LVOT DIAMETER: 2 CM
DOP CALC LVOT PEAK VEL: 1.25 M/S
DOP CALC LVOT STROKE VOLUME: 82.74 CM3
DOP CALC RVOT PEAK VEL: 0.76 M/S
DOP CALC RVOT VTI: 13.13 CM
DOP CALCLVOT PEAK VEL VTI: 26.35 CM
E WAVE DECELERATION TIME: 250.13 MSEC
E/A RATIO: 0.96
E/E' RATIO: 5.36 M/S
ECHO LV POSTERIOR WALL: 0.9 CM (ref 0.6–1.1)
EJECTION FRACTION: 55 %
EOSINOPHIL # BLD AUTO: 0 K/UL (ref 0–0.5)
EOSINOPHIL NFR BLD: 0 % (ref 0–8)
ERYTHROCYTE [DISTWIDTH] IN BLOOD BY AUTOMATED COUNT: 12.8 % (ref 11.5–14.5)
EST. GFR  (AFRICAN AMERICAN): >60 ML/MIN/1.73 M^2
EST. GFR  (NON AFRICAN AMERICAN): >60 ML/MIN/1.73 M^2
FRACTIONAL SHORTENING: 34 % (ref 28–44)
GLUCOSE SERPL-MCNC: 126 MG/DL (ref 70–110)
HCT VFR BLD AUTO: 36.6 % (ref 40–54)
HCV AB SERPL QL IA: NEGATIVE
HEP C VIRUS HOLD SPECIMEN: NORMAL
HGB BLD-MCNC: 13.1 G/DL (ref 14–18)
HIV 1+2 AB+HIV1 P24 AG SERPL QL IA: NEGATIVE
IMM GRANULOCYTES # BLD AUTO: 0.04 K/UL (ref 0–0.04)
IMM GRANULOCYTES NFR BLD AUTO: 0.6 % (ref 0–0.5)
INR PPP: 1 (ref 0.8–1.2)
INTERVENTRICULAR SEPTUM: 1.05 CM (ref 0.6–1.1)
IVRT: 69.2 MSEC
LA MAJOR: 4.84 CM
LA MINOR: 4.84 CM
LA WIDTH: 3.86 CM
LACTATE SERPL-SCNC: 2.8 MMOL/L (ref 0.5–2.2)
LACTATE SERPL-SCNC: 5.7 MMOL/L (ref 0.5–2.2)
LEFT ATRIUM SIZE: 3.13 CM
LEFT ATRIUM VOLUME INDEX: 25.2 ML/M2
LEFT ATRIUM VOLUME: 49.7 CM3
LEFT INTERNAL DIMENSION IN SYSTOLE: 3.16 CM (ref 2.1–4)
LEFT VENTRICLE DIASTOLIC VOLUME INDEX: 53.77 ML/M2
LEFT VENTRICLE DIASTOLIC VOLUME: 105.93 ML
LEFT VENTRICLE MASS INDEX: 83 G/M2
LEFT VENTRICLE SYSTOLIC VOLUME INDEX: 20.1 ML/M2
LEFT VENTRICLE SYSTOLIC VOLUME: 39.66 ML
LEFT VENTRICULAR INTERNAL DIMENSION IN DIASTOLE: 4.77 CM (ref 3.5–6)
LEFT VENTRICULAR MASS: 162.78 G
LV LATERAL E/E' RATIO: 5.15 M/S
LV SEPTAL E/E' RATIO: 5.58 M/S
LYMPHOCYTES # BLD AUTO: 0.6 K/UL (ref 1–4.8)
LYMPHOCYTES NFR BLD: 8.1 % (ref 18–48)
MAGNESIUM SERPL-MCNC: 1.7 MG/DL (ref 1.6–2.6)
MCH RBC QN AUTO: 31.8 PG (ref 27–31)
MCHC RBC AUTO-ENTMCNC: 35.8 G/DL (ref 32–36)
MCV RBC AUTO: 89 FL (ref 82–98)
MONOCYTES # BLD AUTO: 0.5 K/UL (ref 0.3–1)
MONOCYTES NFR BLD: 6.9 % (ref 4–15)
MV PEAK A VEL: 0.7 M/S
MV PEAK E VEL: 0.67 M/S
MV STENOSIS PRESSURE HALF TIME: 72.54 MS
MV VALVE AREA P 1/2 METHOD: 3.03 CM2
NEUTROPHILS # BLD AUTO: 5.8 K/UL (ref 1.8–7.7)
NEUTROPHILS NFR BLD: 84.3 % (ref 38–73)
NRBC BLD-RTO: 0 /100 WBC
PHOSPHATE SERPL-MCNC: 2.1 MG/DL (ref 2.7–4.5)
PISA MRMAX VEL: 0.03 M/S
PISA TR MAX VEL: 1.9 M/S
PLATELET # BLD AUTO: 120 K/UL (ref 150–450)
PLATELET BLD QL SMEAR: ABNORMAL
PMV BLD AUTO: 9.1 FL (ref 9.2–12.9)
POTASSIUM SERPL-SCNC: 4.1 MMOL/L (ref 3.5–5.1)
PROT SERPL-MCNC: 6.3 G/DL (ref 6–8.4)
PROTHROMBIN TIME: 10.9 SEC (ref 9–12.5)
PV MEAN GRADIENT: 2 MMHG
RA MAJOR: 4.44 CM
RA PRESSURE: 3 MMHG
RA WIDTH: 3.86 CM
RBC # BLD AUTO: 4.12 M/UL (ref 4.6–6.2)
SINUS: 3.17 CM
SODIUM SERPL-SCNC: 137 MMOL/L (ref 136–145)
STJ: 3.03 CM
TDI LATERAL: 0.13 M/S
TDI SEPTAL: 0.12 M/S
TDI: 0.13 M/S
TR MAX PG: 14 MMHG
TRICUSPID ANNULAR PLANE SYSTOLIC EXCURSION: 2.08 CM
TROPONIN I SERPL DL<=0.01 NG/ML-MCNC: 0.03 NG/ML (ref 0–0.03)
TV REST PULMONARY ARTERY PRESSURE: 17 MMHG
WBC # BLD AUTO: 6.83 K/UL (ref 3.9–12.7)

## 2021-06-21 PROCEDURE — 63600175 PHARM REV CODE 636 W HCPCS: Performed by: NURSE PRACTITIONER

## 2021-06-21 PROCEDURE — 99223 PR INITIAL HOSPITAL CARE,LEVL III: ICD-10-PCS | Mod: 25,,, | Performed by: INTERNAL MEDICINE

## 2021-06-21 PROCEDURE — 63600175 PHARM REV CODE 636 W HCPCS: Mod: ER | Performed by: NURSE PRACTITIONER

## 2021-06-21 PROCEDURE — 85025 COMPLETE CBC W/AUTO DIFF WBC: CPT | Performed by: INTERNAL MEDICINE

## 2021-06-21 PROCEDURE — S4991 NICOTINE PATCH NONLEGEND: HCPCS | Performed by: NURSE PRACTITIONER

## 2021-06-21 PROCEDURE — 80053 COMPREHEN METABOLIC PANEL: CPT | Performed by: INTERNAL MEDICINE

## 2021-06-21 PROCEDURE — 36415 COLL VENOUS BLD VENIPUNCTURE: CPT | Performed by: INTERNAL MEDICINE

## 2021-06-21 PROCEDURE — 83605 ASSAY OF LACTIC ACID: CPT | Mod: 91 | Performed by: INTERNAL MEDICINE

## 2021-06-21 PROCEDURE — 25000003 PHARM REV CODE 250: Performed by: NURSE PRACTITIONER

## 2021-06-21 PROCEDURE — 83735 ASSAY OF MAGNESIUM: CPT | Performed by: INTERNAL MEDICINE

## 2021-06-21 PROCEDURE — 99223 1ST HOSP IP/OBS HIGH 75: CPT | Mod: 25,,, | Performed by: INTERNAL MEDICINE

## 2021-06-21 PROCEDURE — 21400001 HC TELEMETRY ROOM

## 2021-06-21 PROCEDURE — 84484 ASSAY OF TROPONIN QUANT: CPT | Performed by: INTERNAL MEDICINE

## 2021-06-21 PROCEDURE — 97161 PT EVAL LOW COMPLEX 20 MIN: CPT

## 2021-06-21 PROCEDURE — 82550 ASSAY OF CK (CPK): CPT | Mod: ER | Performed by: NURSE PRACTITIONER

## 2021-06-21 PROCEDURE — 25000003 PHARM REV CODE 250: Performed by: EMERGENCY MEDICINE

## 2021-06-21 PROCEDURE — 25000003 PHARM REV CODE 250: Performed by: INTERNAL MEDICINE

## 2021-06-21 PROCEDURE — 85610 PROTHROMBIN TIME: CPT | Performed by: INTERNAL MEDICINE

## 2021-06-21 PROCEDURE — 84100 ASSAY OF PHOSPHORUS: CPT | Performed by: INTERNAL MEDICINE

## 2021-06-21 PROCEDURE — 25000003 PHARM REV CODE 250: Mod: ER | Performed by: NURSE PRACTITIONER

## 2021-06-21 PROCEDURE — 93010 EKG 12-LEAD: ICD-10-PCS | Mod: ,,, | Performed by: INTERNAL MEDICINE

## 2021-06-21 PROCEDURE — 93010 ELECTROCARDIOGRAM REPORT: CPT | Mod: ,,, | Performed by: INTERNAL MEDICINE

## 2021-06-21 PROCEDURE — 85730 THROMBOPLASTIN TIME PARTIAL: CPT | Mod: ER | Performed by: EMERGENCY MEDICINE

## 2021-06-21 PROCEDURE — 83605 ASSAY OF LACTIC ACID: CPT | Mod: ER | Performed by: INTERNAL MEDICINE

## 2021-06-21 PROCEDURE — 97530 THERAPEUTIC ACTIVITIES: CPT

## 2021-06-21 PROCEDURE — 94761 N-INVAS EAR/PLS OXIMETRY MLT: CPT

## 2021-06-21 PROCEDURE — 25000003 PHARM REV CODE 250: Performed by: PHYSICIAN ASSISTANT

## 2021-06-21 PROCEDURE — 63600175 PHARM REV CODE 636 W HCPCS: Performed by: INTERNAL MEDICINE

## 2021-06-21 PROCEDURE — 93005 ELECTROCARDIOGRAM TRACING: CPT

## 2021-06-21 RX ORDER — SODIUM CHLORIDE 0.9 % (FLUSH) 0.9 %
10 SYRINGE (ML) INJECTION
Status: DISCONTINUED | OUTPATIENT
Start: 2021-06-21 | End: 2021-06-22 | Stop reason: HOSPADM

## 2021-06-21 RX ORDER — METOPROLOL TARTRATE 50 MG/1
50 TABLET ORAL 2 TIMES DAILY
Status: DISCONTINUED | OUTPATIENT
Start: 2021-06-21 | End: 2021-06-22 | Stop reason: HOSPADM

## 2021-06-21 RX ORDER — IBUPROFEN 200 MG
1 TABLET ORAL DAILY
Status: DISCONTINUED | OUTPATIENT
Start: 2021-06-21 | End: 2021-06-22 | Stop reason: HOSPADM

## 2021-06-21 RX ORDER — TALC
6 POWDER (GRAM) TOPICAL NIGHTLY PRN
Status: DISCONTINUED | OUTPATIENT
Start: 2021-06-21 | End: 2021-06-22 | Stop reason: HOSPADM

## 2021-06-21 RX ORDER — FAMOTIDINE 20 MG/1
20 TABLET, FILM COATED ORAL 2 TIMES DAILY
Status: DISCONTINUED | OUTPATIENT
Start: 2021-06-21 | End: 2021-06-22 | Stop reason: HOSPADM

## 2021-06-21 RX ORDER — METOPROLOL TARTRATE 25 MG/1
25 TABLET, FILM COATED ORAL 2 TIMES DAILY
Status: DISCONTINUED | OUTPATIENT
Start: 2021-06-21 | End: 2021-06-21

## 2021-06-21 RX ORDER — LANOLIN ALCOHOL/MO/W.PET/CERES
400 CREAM (GRAM) TOPICAL ONCE
Status: COMPLETED | OUTPATIENT
Start: 2021-06-21 | End: 2021-06-21

## 2021-06-21 RX ORDER — ASPIRIN 81 MG/1
81 TABLET ORAL DAILY
Status: DISCONTINUED | OUTPATIENT
Start: 2021-06-21 | End: 2021-06-22 | Stop reason: HOSPADM

## 2021-06-21 RX ORDER — ENOXAPARIN SODIUM 100 MG/ML
40 INJECTION SUBCUTANEOUS EVERY 24 HOURS
Status: DISCONTINUED | OUTPATIENT
Start: 2021-06-21 | End: 2021-06-22

## 2021-06-21 RX ADMIN — FOLIC ACID: 5 INJECTION, SOLUTION INTRAMUSCULAR; INTRAVENOUS; SUBCUTANEOUS at 09:06

## 2021-06-21 RX ADMIN — CHLORDIAZEPOXIDE HYDROCHLORIDE 25 MG: 25 CAPSULE ORAL at 08:06

## 2021-06-21 RX ADMIN — SODIUM CHLORIDE: 0.9 INJECTION, SOLUTION INTRAVENOUS at 12:06

## 2021-06-21 RX ADMIN — NICOTINE 1 PATCH: 14 PATCH TRANSDERMAL at 11:06

## 2021-06-21 RX ADMIN — LORAZEPAM 1 MG: 2 INJECTION INTRAMUSCULAR; INTRAVENOUS at 06:06

## 2021-06-21 RX ADMIN — MAGNESIUM OXIDE TAB 400 MG (241.3 MG ELEMENTAL MG) 400 MG: 400 (241.3 MG) TAB at 03:06

## 2021-06-21 RX ADMIN — ASPIRIN 81 MG: 81 TABLET, COATED ORAL at 11:06

## 2021-06-21 RX ADMIN — ENOXAPARIN SODIUM 40 MG: 40 INJECTION SUBCUTANEOUS at 04:06

## 2021-06-21 RX ADMIN — METOPROLOL TARTRATE 50 MG: 50 TABLET, FILM COATED ORAL at 08:06

## 2021-06-21 RX ADMIN — LORAZEPAM 1 MG: 2 INJECTION INTRAMUSCULAR; INTRAVENOUS at 07:06

## 2021-06-21 RX ADMIN — CHLORDIAZEPOXIDE HYDROCHLORIDE 25 MG: 25 CAPSULE ORAL at 03:06

## 2021-06-21 RX ADMIN — ONDANSETRON 4 MG: 2 INJECTION INTRAMUSCULAR; INTRAVENOUS at 07:06

## 2021-06-21 RX ADMIN — LORAZEPAM 1 MG: 2 INJECTION INTRAMUSCULAR; INTRAVENOUS at 11:06

## 2021-06-21 RX ADMIN — METOPROLOL TARTRATE 25 MG: 25 TABLET, FILM COATED ORAL at 02:06

## 2021-06-21 RX ADMIN — LORAZEPAM 1 MG: 2 INJECTION INTRAMUSCULAR; INTRAVENOUS at 02:06

## 2021-06-21 RX ADMIN — MAGNESIUM SULFATE 2 G: 1 INJECTION INTRAVENOUS at 12:06

## 2021-06-21 RX ADMIN — ONDANSETRON 4 MG: 2 INJECTION INTRAMUSCULAR; INTRAVENOUS at 02:06

## 2021-06-21 RX ADMIN — Medication 15 MG/HR: at 09:06

## 2021-06-21 RX ADMIN — FAMOTIDINE 20 MG: 20 TABLET ORAL at 08:06

## 2021-06-21 RX ADMIN — ONDANSETRON 4 MG: 2 INJECTION INTRAMUSCULAR; INTRAVENOUS at 06:06

## 2021-06-21 RX ADMIN — METOPROLOL TARTRATE 25 MG: 25 TABLET, FILM COATED ORAL at 08:06

## 2021-06-21 RX ADMIN — Medication 15 MG/HR: at 02:06

## 2021-06-22 VITALS
DIASTOLIC BLOOD PRESSURE: 75 MMHG | SYSTOLIC BLOOD PRESSURE: 118 MMHG | OXYGEN SATURATION: 97 % | BODY MASS INDEX: 26.51 KG/M2 | WEIGHT: 179 LBS | TEMPERATURE: 98 F | HEART RATE: 80 BPM | HEIGHT: 69 IN | RESPIRATION RATE: 16 BRPM

## 2021-06-22 PROBLEM — E87.20 LACTIC ACIDOSIS: Status: RESOLVED | Noted: 2017-04-24 | Resolved: 2021-06-22

## 2021-06-22 PROBLEM — F10.929 ALCOHOLIC INTOXICATION WITH COMPLICATION: Status: RESOLVED | Noted: 2021-06-20 | Resolved: 2021-06-22

## 2021-06-22 PROBLEM — I48.91 ATRIAL FIBRILLATION WITH RVR: Status: RESOLVED | Noted: 2021-06-21 | Resolved: 2021-06-22

## 2021-06-22 PROBLEM — F10.229: Status: ACTIVE | Noted: 2017-09-25

## 2021-06-22 LAB
ALBUMIN SERPL BCP-MCNC: 3.6 G/DL (ref 3.5–5.2)
ALP SERPL-CCNC: 87 U/L (ref 55–135)
ALT SERPL W/O P-5'-P-CCNC: 136 U/L (ref 10–44)
ANION GAP SERPL CALC-SCNC: 10 MMOL/L (ref 8–16)
AST SERPL-CCNC: 154 U/L (ref 10–40)
BASOPHILS # BLD AUTO: 0.03 K/UL (ref 0–0.2)
BASOPHILS NFR BLD: 0.7 % (ref 0–1.9)
BILIRUB SERPL-MCNC: 1.8 MG/DL (ref 0.1–1)
BUN SERPL-MCNC: 9 MG/DL (ref 8–23)
CALCIUM SERPL-MCNC: 8.7 MG/DL (ref 8.7–10.5)
CHLORIDE SERPL-SCNC: 101 MMOL/L (ref 95–110)
CK SERPL-CCNC: 477 U/L (ref 20–200)
CO2 SERPL-SCNC: 28 MMOL/L (ref 23–29)
CREAT SERPL-MCNC: 0.9 MG/DL (ref 0.5–1.4)
DIFFERENTIAL METHOD: ABNORMAL
EOSINOPHIL # BLD AUTO: 0 K/UL (ref 0–0.5)
EOSINOPHIL NFR BLD: 0.5 % (ref 0–8)
ERYTHROCYTE [DISTWIDTH] IN BLOOD BY AUTOMATED COUNT: 12.8 % (ref 11.5–14.5)
EST. GFR  (AFRICAN AMERICAN): >60 ML/MIN/1.73 M^2
EST. GFR  (NON AFRICAN AMERICAN): >60 ML/MIN/1.73 M^2
GLUCOSE SERPL-MCNC: 94 MG/DL (ref 70–110)
HCT VFR BLD AUTO: 36.7 % (ref 40–54)
HGB BLD-MCNC: 12.7 G/DL (ref 14–18)
IMM GRANULOCYTES # BLD AUTO: 0.01 K/UL (ref 0–0.04)
IMM GRANULOCYTES NFR BLD AUTO: 0.2 % (ref 0–0.5)
LACTATE SERPL-SCNC: 1.4 MMOL/L (ref 0.5–2.2)
LYMPHOCYTES # BLD AUTO: 2.1 K/UL (ref 1–4.8)
LYMPHOCYTES NFR BLD: 46.5 % (ref 18–48)
MCH RBC QN AUTO: 31.8 PG (ref 27–31)
MCHC RBC AUTO-ENTMCNC: 34.6 G/DL (ref 32–36)
MCV RBC AUTO: 92 FL (ref 82–98)
MONOCYTES # BLD AUTO: 0.2 K/UL (ref 0.3–1)
MONOCYTES NFR BLD: 5 % (ref 4–15)
NEUTROPHILS # BLD AUTO: 2.1 K/UL (ref 1.8–7.7)
NEUTROPHILS NFR BLD: 47.1 % (ref 38–73)
NRBC BLD-RTO: 0 /100 WBC
PLATELET # BLD AUTO: 95 K/UL (ref 150–450)
PMV BLD AUTO: 9.4 FL (ref 9.2–12.9)
POTASSIUM SERPL-SCNC: 3.3 MMOL/L (ref 3.5–5.1)
PROT SERPL-MCNC: 6.1 G/DL (ref 6–8.4)
RBC # BLD AUTO: 4 M/UL (ref 4.6–6.2)
SODIUM SERPL-SCNC: 139 MMOL/L (ref 136–145)
WBC # BLD AUTO: 4.43 K/UL (ref 3.9–12.7)

## 2021-06-22 PROCEDURE — 94761 N-INVAS EAR/PLS OXIMETRY MLT: CPT

## 2021-06-22 PROCEDURE — 25000003 PHARM REV CODE 250: Performed by: INTERNAL MEDICINE

## 2021-06-22 PROCEDURE — 63600175 PHARM REV CODE 636 W HCPCS: Performed by: INTERNAL MEDICINE

## 2021-06-22 PROCEDURE — 36415 COLL VENOUS BLD VENIPUNCTURE: CPT | Performed by: NURSE PRACTITIONER

## 2021-06-22 PROCEDURE — 80053 COMPREHEN METABOLIC PANEL: CPT | Performed by: NURSE PRACTITIONER

## 2021-06-22 PROCEDURE — 25000003 PHARM REV CODE 250: Performed by: NURSE PRACTITIONER

## 2021-06-22 PROCEDURE — S4991 NICOTINE PATCH NONLEGEND: HCPCS | Performed by: NURSE PRACTITIONER

## 2021-06-22 PROCEDURE — 97165 OT EVAL LOW COMPLEX 30 MIN: CPT

## 2021-06-22 PROCEDURE — 85025 COMPLETE CBC W/AUTO DIFF WBC: CPT | Performed by: NURSE PRACTITIONER

## 2021-06-22 PROCEDURE — 82550 ASSAY OF CK (CPK): CPT | Performed by: NURSE PRACTITIONER

## 2021-06-22 PROCEDURE — 25000003 PHARM REV CODE 250: Performed by: PHYSICIAN ASSISTANT

## 2021-06-22 PROCEDURE — 83605 ASSAY OF LACTIC ACID: CPT | Performed by: NURSE PRACTITIONER

## 2021-06-22 RX ORDER — METOPROLOL TARTRATE 50 MG/1
50 TABLET ORAL 2 TIMES DAILY
Qty: 60 TABLET | Refills: 11 | Status: SHIPPED | OUTPATIENT
Start: 2021-06-22 | End: 2022-06-22

## 2021-06-22 RX ORDER — POTASSIUM CHLORIDE 20 MEQ/1
40 TABLET, EXTENDED RELEASE ORAL ONCE
Status: COMPLETED | OUTPATIENT
Start: 2021-06-22 | End: 2021-06-22

## 2021-06-22 RX ORDER — CHLORDIAZEPOXIDE HYDROCHLORIDE 10 MG/1
10 CAPSULE, GELATIN COATED ORAL 3 TIMES DAILY PRN
Qty: 6 CAPSULE | Refills: 0 | Status: SHIPPED | OUTPATIENT
Start: 2021-06-22 | End: 2021-06-24

## 2021-06-22 RX ORDER — ASPIRIN 81 MG/1
81 TABLET ORAL DAILY
Qty: 30 TABLET | Refills: 0 | Status: SHIPPED | OUTPATIENT
Start: 2021-06-23 | End: 2021-07-23

## 2021-06-22 RX ORDER — IBUPROFEN 200 MG
1 TABLET ORAL DAILY
Qty: 7 PATCH | Refills: 0 | Status: SHIPPED | OUTPATIENT
Start: 2021-06-23 | End: 2021-06-30

## 2021-06-22 RX ORDER — FAMOTIDINE 20 MG/1
20 TABLET, FILM COATED ORAL 2 TIMES DAILY
Qty: 60 TABLET | Refills: 0 | Status: SHIPPED | OUTPATIENT
Start: 2021-06-22 | End: 2021-07-22

## 2021-06-22 RX ADMIN — FAMOTIDINE 20 MG: 20 TABLET ORAL at 08:06

## 2021-06-22 RX ADMIN — NICOTINE 1 PATCH: 14 PATCH TRANSDERMAL at 08:06

## 2021-06-22 RX ADMIN — ASPIRIN 81 MG: 81 TABLET, COATED ORAL at 08:06

## 2021-06-22 RX ADMIN — METOPROLOL TARTRATE 50 MG: 50 TABLET, FILM COATED ORAL at 08:06

## 2021-06-22 RX ADMIN — CHLORDIAZEPOXIDE HYDROCHLORIDE 25 MG: 25 CAPSULE ORAL at 08:06

## 2021-06-22 RX ADMIN — POTASSIUM CHLORIDE 40 MEQ: 1500 TABLET, EXTENDED RELEASE ORAL at 08:06

## 2021-06-22 RX ADMIN — FOLIC ACID: 5 INJECTION, SOLUTION INTRAMUSCULAR; INTRAVENOUS; SUBCUTANEOUS at 08:06

## 2021-06-26 LAB
BACTERIA BLD CULT: NORMAL
BACTERIA BLD CULT: NORMAL

## 2021-07-12 ENCOUNTER — TELEPHONE (OUTPATIENT)
Dept: CARDIOLOGY | Facility: CLINIC | Age: 66
End: 2021-07-12

## 2021-07-16 ENCOUNTER — TELEPHONE (OUTPATIENT)
Dept: CARDIOLOGY | Facility: CLINIC | Age: 66
End: 2021-07-16

## 2021-12-16 ENCOUNTER — HOSPITAL ENCOUNTER (EMERGENCY)
Facility: HOSPITAL | Age: 66
Discharge: HOME | End: 2021-12-16
Attending: EMERGENCY MEDICINE
Payer: MEDICARE

## 2021-12-16 VITALS
WEIGHT: 176.38 LBS | HEART RATE: 72 BPM | TEMPERATURE: 98 F | DIASTOLIC BLOOD PRESSURE: 74 MMHG | RESPIRATION RATE: 20 BRPM | OXYGEN SATURATION: 97 % | SYSTOLIC BLOOD PRESSURE: 114 MMHG | BODY MASS INDEX: 26.05 KG/M2

## 2021-12-16 DIAGNOSIS — F10.10 ALCOHOL ABUSE: ICD-10-CM

## 2021-12-16 DIAGNOSIS — R11.2 NON-INTRACTABLE VOMITING WITH NAUSEA, UNSPECIFIED VOMITING TYPE: Primary | ICD-10-CM

## 2021-12-16 LAB
ALBUMIN SERPL BCP-MCNC: 4.4 G/DL (ref 3.5–5.2)
ALP SERPL-CCNC: 95 U/L (ref 55–135)
ALT SERPL W/O P-5'-P-CCNC: 65 U/L (ref 10–44)
ANION GAP SERPL CALC-SCNC: 18 MMOL/L (ref 8–16)
AST SERPL-CCNC: 49 U/L (ref 10–40)
BASOPHILS # BLD AUTO: 0.02 K/UL (ref 0–0.2)
BASOPHILS NFR BLD: 0.2 % (ref 0–1.9)
BILIRUB SERPL-MCNC: 2.5 MG/DL (ref 0.1–1)
BUN SERPL-MCNC: 21 MG/DL (ref 8–23)
CALCIUM SERPL-MCNC: 9.8 MG/DL (ref 8.7–10.5)
CHLORIDE SERPL-SCNC: 91 MMOL/L (ref 95–110)
CO2 SERPL-SCNC: 24 MMOL/L (ref 23–29)
CREAT SERPL-MCNC: 1 MG/DL (ref 0.5–1.4)
DIFFERENTIAL METHOD: ABNORMAL
EOSINOPHIL # BLD AUTO: 0 K/UL (ref 0–0.5)
EOSINOPHIL NFR BLD: 0 % (ref 0–8)
ERYTHROCYTE [DISTWIDTH] IN BLOOD BY AUTOMATED COUNT: 12.6 % (ref 11.5–14.5)
EST. GFR  (AFRICAN AMERICAN): >60 ML/MIN/1.73 M^2
EST. GFR  (NON AFRICAN AMERICAN): >60 ML/MIN/1.73 M^2
GLUCOSE SERPL-MCNC: 244 MG/DL (ref 70–110)
HCT VFR BLD AUTO: 43 % (ref 40–54)
HGB BLD-MCNC: 15.8 G/DL (ref 14–18)
IMM GRANULOCYTES # BLD AUTO: 0.03 K/UL (ref 0–0.04)
IMM GRANULOCYTES NFR BLD AUTO: 0.3 % (ref 0–0.5)
LIPASE SERPL-CCNC: 18 U/L (ref 4–60)
LYMPHOCYTES # BLD AUTO: 0.6 K/UL (ref 1–4.8)
LYMPHOCYTES NFR BLD: 6.4 % (ref 18–48)
MCH RBC QN AUTO: 32 PG (ref 27–31)
MCHC RBC AUTO-ENTMCNC: 36.7 G/DL (ref 32–36)
MCV RBC AUTO: 87 FL (ref 82–98)
MONOCYTES # BLD AUTO: 0.5 K/UL (ref 0.3–1)
MONOCYTES NFR BLD: 5.1 % (ref 4–15)
NEUTROPHILS # BLD AUTO: 8.3 K/UL (ref 1.8–7.7)
NEUTROPHILS NFR BLD: 88 % (ref 38–73)
NRBC BLD-RTO: 0 /100 WBC
PLATELET # BLD AUTO: 192 K/UL (ref 150–450)
PMV BLD AUTO: 8.9 FL (ref 9.2–12.9)
POTASSIUM SERPL-SCNC: 3.8 MMOL/L (ref 3.5–5.1)
PROT SERPL-MCNC: 7.6 G/DL (ref 6–8.4)
RBC # BLD AUTO: 4.94 M/UL (ref 4.6–6.2)
SODIUM SERPL-SCNC: 133 MMOL/L (ref 136–145)
WBC # BLD AUTO: 9.44 K/UL (ref 3.9–12.7)

## 2021-12-16 PROCEDURE — 96365 THER/PROPH/DIAG IV INF INIT: CPT | Mod: ER

## 2021-12-16 PROCEDURE — 85025 COMPLETE CBC W/AUTO DIFF WBC: CPT | Mod: ER | Performed by: EMERGENCY MEDICINE

## 2021-12-16 PROCEDURE — 80053 COMPREHEN METABOLIC PANEL: CPT | Mod: ER | Performed by: EMERGENCY MEDICINE

## 2021-12-16 PROCEDURE — 99284 EMERGENCY DEPT VISIT MOD MDM: CPT | Mod: 25,ER

## 2021-12-16 PROCEDURE — 25000003 PHARM REV CODE 250: Mod: ER | Performed by: EMERGENCY MEDICINE

## 2021-12-16 PROCEDURE — 63600175 PHARM REV CODE 636 W HCPCS: Mod: ER | Performed by: EMERGENCY MEDICINE

## 2021-12-16 PROCEDURE — 96375 TX/PRO/DX INJ NEW DRUG ADDON: CPT | Mod: ER

## 2021-12-16 PROCEDURE — 83690 ASSAY OF LIPASE: CPT | Mod: ER | Performed by: EMERGENCY MEDICINE

## 2021-12-16 RX ORDER — PROMETHAZINE HYDROCHLORIDE 25 MG/1
25 TABLET ORAL EVERY 6 HOURS PRN
Qty: 15 TABLET | Refills: 0 | Status: SHIPPED | OUTPATIENT
Start: 2021-12-16

## 2021-12-16 RX ORDER — PROMETHAZINE HYDROCHLORIDE 25 MG/1
25 TABLET ORAL EVERY 6 HOURS PRN
Qty: 15 TABLET | Refills: 0 | Status: SHIPPED | OUTPATIENT
Start: 2021-12-16 | End: 2021-12-16 | Stop reason: SDUPTHER

## 2021-12-16 RX ADMIN — SODIUM CHLORIDE 1000 ML: 0.9 INJECTION, SOLUTION INTRAVENOUS at 04:12

## 2021-12-16 RX ADMIN — PROMETHAZINE HYDROCHLORIDE 12.5 MG: 25 INJECTION INTRAMUSCULAR; INTRAVENOUS at 04:12

## 2021-12-16 RX ADMIN — LORAZEPAM 1 MG: 2 INJECTION INTRAMUSCULAR; INTRAVENOUS at 04:12

## 2024-10-24 ENCOUNTER — TELEPHONE (OUTPATIENT)
Dept: TRANSPLANT | Facility: CLINIC | Age: 69
End: 2024-10-24

## 2024-10-24 NOTE — TELEPHONE ENCOUNTER
Spoke with Dr. Pablo Latham.  Pt is inpatient at [facility] in [place].  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 Pt with hx of alcohol, continued to drink despite diagnosis. Last seen by Dr Silvestre([facility]) 2021.  Per  Dr Latham they are inquiring if pt is a transplant candidate. Spoke to Dr Obando.  He will review chart and call Dr Snow.

## 2024-10-24 NOTE — TELEPHONE ENCOUNTER
----- Message from Sherwinpam sent at 10/24/2024  1:51 PM CDT -----  Regarding: FW: Urgent Consult/Advisory  Contact: [phone]  Consult/Advisory      Name Of Caller: Pablo LONGO         Contact Preference: [phone]      Nature of call: Pt is currently admitted and he would like pt consider for liver transplant. Please call to advise as soon as possible  ----- Message -----  From: Sobeida Moss  Sent: 10/24/2024  12:54 PM CDT  To: Gareth Dill  Subject: FW: Urgent Consult/Advisory                        ----- Message -----  From: Ofelia Page  Sent: 10/24/2024  12:13 PM CDT  To: [facility] Pre-Liver Transplant Non-Clinical  Subject: Urgent Consult/Advisory                          Consult/Advisory     Name Of Caller: Pablo LONGO         Contact Preference: [phone]     Nature of call: Pt is currently admitted and he would like pt consider for liver transplant. Please call to advise as soon as possible